# Patient Record
Sex: FEMALE | Race: WHITE | NOT HISPANIC OR LATINO | Employment: OTHER | URBAN - METROPOLITAN AREA
[De-identification: names, ages, dates, MRNs, and addresses within clinical notes are randomized per-mention and may not be internally consistent; named-entity substitution may affect disease eponyms.]

---

## 2017-01-03 ENCOUNTER — TELEPHONE (OUTPATIENT)
Dept: FAMILY MEDICINE | Facility: CLINIC | Age: 67
End: 2017-01-03

## 2017-01-03 DIAGNOSIS — E11.8 CONTROLLED TYPE 2 DIABETES MELLITUS WITH COMPLICATION, WITHOUT LONG-TERM CURRENT USE OF INSULIN: Primary | ICD-10-CM

## 2017-01-03 NOTE — TELEPHONE ENCOUNTER
----- Message from Emperatriz Porter sent at 1/3/2017  3:05 PM CST -----  Please call  @ [phone] - there's been a change of insurance, labwork location and med provider....please call to discuss this with him...

## 2017-01-03 NOTE — TELEPHONE ENCOUNTER
Orders for siOPTICA pended.  Lab appt at [facility] cancelled.  Please cancel/remove orders for [facility].

## 2017-01-10 LAB
ALBUMIN SERPL-MCNC: 4.6 G/DL (ref 3.6–5.1)
ALBUMIN/GLOB SERPL: 1.9 (CALC) (ref 1–2.5)
ALP SERPL-CCNC: 60 U/L (ref 33–130)
ALT SERPL-CCNC: 22 U/L (ref 6–29)
AST SERPL-CCNC: 21 U/L (ref 10–35)
BASOPHILS # BLD AUTO: 44 CELLS/UL (ref 0–200)
BASOPHILS NFR BLD AUTO: 0.5 %
BILIRUB SERPL-MCNC: 0.5 MG/DL (ref 0.2–1.2)
BUN SERPL-MCNC: 18 MG/DL (ref 7–25)
BUN/CREAT SERPL: ABNORMAL (CALC) (ref 6–22)
CALCIUM SERPL-MCNC: 10 MG/DL (ref 8.6–10.4)
CHLORIDE SERPL-SCNC: 102 MMOL/L (ref 98–110)
CHOLEST SERPL-MCNC: 153 MG/DL (ref 125–200)
CHOLEST/HDLC SERPL: 2.9 (CALC)
CO2 SERPL-SCNC: 33 MMOL/L (ref 20–31)
CREAT SERPL-MCNC: 0.85 MG/DL (ref 0.5–0.99)
EOSINOPHIL # BLD AUTO: 202 CELLS/UL (ref 15–500)
EOSINOPHIL NFR BLD AUTO: 2.3 %
ERYTHROCYTE [DISTWIDTH] IN BLOOD BY AUTOMATED COUNT: 14.8 % (ref 11–15)
GFR SERPL CREATININE-BSD FRML MDRD: 72 ML/MIN/1.73M2
GLOBULIN SER CALC-MCNC: 2.4 G/DL (CALC) (ref 1.9–3.7)
GLUCOSE SERPL-MCNC: 122 MG/DL (ref 65–99)
HBA1C MFR BLD: 6.3 % OF TOTAL HGB
HCT VFR BLD AUTO: 40.9 % (ref 35–45)
HDLC SERPL-MCNC: 53 MG/DL
HGB BLD-MCNC: 12.9 G/DL (ref 11.7–15.5)
LDLC SERPL CALC-MCNC: 75 MG/DL (CALC)
LYMPHOCYTES # BLD AUTO: 3247 CELLS/UL (ref 850–3900)
LYMPHOCYTES NFR BLD AUTO: 36.9 %
MCH RBC QN AUTO: 26 PG (ref 27–33)
MCHC RBC AUTO-ENTMCNC: 31.5 G/DL (ref 32–36)
MCV RBC AUTO: 82.4 FL (ref 80–100)
MONOCYTES # BLD AUTO: 607 CELLS/UL (ref 200–950)
MONOCYTES NFR BLD AUTO: 6.9 %
NEUTROPHILS # BLD AUTO: 4699 CELLS/UL (ref 1500–7800)
NEUTROPHILS NFR BLD AUTO: 53.4 %
NONHDLC SERPL-MCNC: 100 MG/DL (CALC)
PLATELET # BLD AUTO: 293 THOUSAND/UL (ref 140–400)
PMV BLD REES-ECKER: 8.9 FL (ref 7.5–11.5)
POTASSIUM SERPL-SCNC: 4.4 MMOL/L (ref 3.5–5.3)
PROT SERPL-MCNC: 7 G/DL (ref 6.1–8.1)
RBC # BLD AUTO: 4.96 MILLION/UL (ref 3.8–5.1)
SODIUM SERPL-SCNC: 139 MMOL/L (ref 135–146)
TRIGL SERPL-MCNC: 123 MG/DL
TSH SERPL-ACNC: 1.99 MIU/L (ref 0.4–4.5)
WBC # BLD AUTO: 8.8 THOUSAND/UL (ref 3.8–10.8)

## 2017-01-11 ENCOUNTER — PATIENT MESSAGE (OUTPATIENT)
Dept: FAMILY MEDICINE | Facility: CLINIC | Age: 67
End: 2017-01-11

## 2017-01-18 ENCOUNTER — OFFICE VISIT (OUTPATIENT)
Dept: OBSTETRICS AND GYNECOLOGY | Facility: CLINIC | Age: 67
End: 2017-01-18
Payer: COMMERCIAL

## 2017-01-18 VITALS
BODY MASS INDEX: 24.99 KG/M2 | SYSTOLIC BLOOD PRESSURE: 128 MMHG | WEIGHT: 135.81 LBS | HEIGHT: 62 IN | DIASTOLIC BLOOD PRESSURE: 78 MMHG

## 2017-01-18 DIAGNOSIS — Z79.890 POSTMENOPAUSAL HRT (HORMONE REPLACEMENT THERAPY): Primary | ICD-10-CM

## 2017-01-18 PROCEDURE — 99213 OFFICE O/P EST LOW 20 MIN: CPT | Mod: S$GLB,,, | Performed by: OBSTETRICS & GYNECOLOGY

## 2017-01-18 PROCEDURE — 1159F MED LIST DOCD IN RCRD: CPT | Mod: S$GLB,,, | Performed by: OBSTETRICS & GYNECOLOGY

## 2017-01-18 PROCEDURE — 3078F DIAST BP <80 MM HG: CPT | Mod: S$GLB,,, | Performed by: OBSTETRICS & GYNECOLOGY

## 2017-01-18 PROCEDURE — 99999 PR PBB SHADOW E&M-EST. PATIENT-LVL II: CPT | Mod: PBBFAC,,, | Performed by: OBSTETRICS & GYNECOLOGY

## 2017-01-18 PROCEDURE — 3074F SYST BP LT 130 MM HG: CPT | Mod: S$GLB,,, | Performed by: OBSTETRICS & GYNECOLOGY

## 2017-01-18 NOTE — MR AVS SNAPSHOT
[facility] - OB/GYN  [address] [place] [address]  Phone: [phone]                  Katherine Rivers   2017 2:00 PM   Office Visit    Description:  Female : 1950   Provider:  Jeremias Plascencia MD   Department:  [facility] - OB/GYN           Reason for Visit     Well Woman                To Do List           Future Appointments        Provider Department Dept Phone    2017 2:00 PM Jeremias Plascencia MD [facility] [phone]      Goals (5 Years of Data)     None      [facility]     [facility] Care [facility] -  Assistance  Registered nurses in the [facility] provide clinical advisement, health education, appointment booking, and other advisory services.  Call for this free service at [phone].             Medications           Message regarding Medications     Verify the changes and/or additions to your medication regime listed below are the same as discussed with your clinician today.  If any of these changes or additions are incorrect, please notify your healthcare provider.        STOP taking these medications     dexamethasone injection 4 mg     FA-vit Bcomp-C-zinc-vitamin D3 0.8-2,000 mg-unit Tab Take 1 tablet by mouth once daily.           Verify that the below list of medications is an accurate representation of the medications you are currently taking.  If none reported, the list may be blank. If incorrect, please contact your healthcare provider. Carry this list with you in case of emergency.           Current Medications     aspirin (ECOTRIN) 81 MG EC tablet Take 81 mg by mouth once daily.    aspirin-sod bicarb-citric acid (KITA-SELTZER) 324 mg TbEF Take 325 mg by mouth every evening. 1/2 tablet at night    atorvastatin (LIPITOR) 20 MG tablet TAKE 1 TABLET DAILY    blood sugar diagnostic (ONETOUCH ULTRA TEST) Strp Check once daily    blood-glucose meter kit 1 each by Other route once daily. Use as instructed    docusate  "sodium (COLACE) 100 MG capsule Take 100 mg by mouth 2 (two) times daily.      estradiol (VAGIFEM) 10 mcg Tab INSERT 1 TABLET VAGINALLY TWICE PER WEEK    L. ACIDOPHILUS/BIFID. ANIMALIS (ONE-A-DAY TRUBIOTICS ORAL) Take 1 tablet by mouth once daily.    MULTIVITAMIN WITH MINERALS (HAIR,SKIN & NAILS ORAL) Take 1 tablet by mouth 2 (two) times daily.      ONE TOUCH ULTRASOFT LANCETS lancets USE AS DIRECTED DAILY AND AS NEEDED    ranitidine (ZANTAC) 300 MG tablet Take 1 tablet (300 mg total) by mouth every evening.    sitagliptan-metformin (JANUMET) 50-1,000 mg per tablet Take 1 tablet by mouth 2 (two) times daily with meals.    tretinoin (RETIN-A) 0.025 % cream     GLUC.METER,DIS.P-LOADED STRIPS (GLUCOSE METER, DISP & STRIPS) Kit 1 each by Misc.(Non-Drug; Combo Route) route as directed.           Clinical Reference Information           Vital Signs - Last Recorded  Most recent update: 1/18/2017  1:52 PM by Galina Edwards LPN    BP Ht Wt LMP BMI    128/78 5' 2" (1.575 m) 61.6 kg (135 lb 12.9 oz) 05/23/2012 24.84 kg/m2      Blood Pressure          Most Recent Value    BP  128/78      Allergies as of 1/18/2017     No Known Drug Allergies      Immunizations Administered on Date of Encounter - 1/18/2017     None      "

## 2017-01-18 NOTE — PROGRESS NOTES
Chief Complaint   Patient presents with    Well Woman       History and Physical:  Patient's last menstrual period was 05/23/2012.       Katherine Rivers is a 66 y.o.  female who presents today for her routine Hormone Replacement Therapy follow up. S/p hysterectomy / BSO / vaginal repair. The patient has no Gynecology complaints today. Doing well with HRt      Allergies:   Review of patient's allergies indicates:   Allergen Reactions    No known drug allergies        Past Medical History   Diagnosis Date    Abnormal Pap smear      biopsy, removal of large polyp per pt    Bilateral hearing loss      wears hearing aides    Cystocele     Diabetes mellitus      dx 57    Diverticular disease     General anesthetics causing adverse effect in therapeutic use      faints after surgery    GERD (gastroesophageal reflux disease)     H/O esophagogastroduodenoscopy      normal 3/16    History of colonoscopy      3/16; next due 3/21    Hyperlipidemia LDL goal < 100     Irritable bowel syndrome     Osteopenia      Dexa 4/11 and 7/15       Past Surgical History   Procedure Laterality Date    Belt abdominoplasty      Cervical polypectomy      Bladder suspension  2/23/12      x 3    Vaginal delivery       times 2    Bilateral salpingoophorectomy      [facility]rectomy  2011     [facility] BSO    Oophorectomy  2011     bilat    Colporrhaphy      Vaginal hysterectomy w/ anterior and posterior vaginal repair  05/20/14    Colonoscopy  1/2006     by Dr. lauren barcenas (report in media section) diverticulosis, otherwise normal findings, repeat in 10 years for screening    Dental implant      Colonoscopy N/A 3/8/2016     Procedure: COLONOSCOPY;  Surgeon: Erickson Uribe Jr., MD;  Location: [facility];  Service: Endoscopy;  Laterality: N/A;    Cholecystectomy         MEDS:   Current Outpatient Prescriptions on File Prior to Visit   Medication Sig Dispense Refill    aspirin (ECOTRIN) 81 MG EC tablet Take 81 mg by mouth  once daily.      aspirin-sod bicarb-citric acid (KITA-SELTZER) 324 mg TbEF Take 325 mg by mouth every evening. 1/2 tablet at night      atorvastatin (LIPITOR) 20 MG tablet TAKE 1 TABLET DAILY 90 tablet 1    blood sugar diagnostic (ONETOUCH ULTRA TEST) Strp Check once daily 150 strip 3    blood-glucose meter kit 1 each by Other route once daily. Use as instructed      docusate sodium (COLACE) 100 MG capsule Take 100 mg by mouth 2 (two) times daily.        estradiol (VAGIFEM) 10 mcg Tab INSERT 1 TABLET VAGINALLY TWICE PER WEEK 26 tablet 3    L. ACIDOPHILUS/BIFID. ANIMALIS (ONE-A-DAY TRUBIOTICS ORAL) Take 1 tablet by mouth once daily.      MULTIVITAMIN WITH MINERALS (HAIR,SKIN & NAILS ORAL) Take 1 tablet by mouth 2 (two) times daily.        ONE TOUCH ULTRASOFT LANCETS lancets USE AS DIRECTED DAILY AND AS NEEDED 1 each 49    ranitidine (ZANTAC) 300 MG tablet Take 1 tablet (300 mg total) by mouth every evening. 90 tablet 3    sitagliptan-metformin (JANUMET) 50-1,000 mg per tablet Take 1 tablet by mouth 2 (two) times daily with meals. 180 tablet 1    tretinoin (RETIN-A) 0.025 % cream       GLUC.METER,DIS.P-LOADED STRIPS (GLUCOSE METER, DISP & STRIPS) Kit 1 each by Misc.(Non-Drug; Combo Route) route as directed. 1 kit     [DISCONTINUED] FA-vit Bcomp-C-zinc-vitamin D3 0.8-2,000 mg-unit Tab Take 1 tablet by mouth once daily.       Current Facility-Administered Medications on File Prior to Visit   Medication Dose Route Frequency Provider Last Rate Last Dose    [DISCONTINUED] dexamethasone injection 4 mg  4 mg Other Once Jeremias Alvarado DPM           OB History      Para Term  AB TAB SAB Ectopic Multiple Living    3 2 2  1  1   2          Social History     Social History    Marital status:      Spouse name: N/A    Number of children: N/A    Years of education: N/A     Occupational History    Not on file.     Social History Main Topics    Smoking status: Never Smoker    Smokeless  "tobacco: Never Used    Alcohol use 0.5 oz/week     1 Standard drinks or equivalent per week      Comment: rarely     Drug use: No    Sexual activity: Yes     Partners: Male     Birth control/ protection: Post-menopausal     Other Topics Concern    Not on file     Social History Narrative       Family History   Problem Relation Age of Onset    Hyperlipidemia Mother     Diabetes Father      Type 1    Cancer Father      lung + tob    Cancer Maternal Aunt      breast cancer    Breast cancer Maternal Aunt     Ovarian cancer Neg Hx          Past medical and surgical history reviewed.   I have reviewed the patient's medical history in detail and updated the computerized patient record.        Review of System:   General: no chills, fever, night sweats, weight gain or weight loss  Psychological: no depression or suicidal ideation  Breasts: no new or changing breast lumps, nipple discharge or masses.  Respiratory: no cough, shortness of breath, or wheezing  Cardiovascular: no chest pain or dyspnea on exertion  Gastrointestinal: no abdominal pain, change in bowel habits, or black or bloody stools  Genito-Urinary: no incontinence, urinary frequency/urgency or vulvar/vaginal symptoms, pelvic pain or abnormal vaginal bleeding.  Musculoskeletal: no gait disturbance or muscular weakness      Physical Exam:     Visit Vitals    /78    Ht 5' 2" (1.575 m)    Wt 61.6 kg (135 lb 12.9 oz)    LMP 05/23/2012    BMI 24.84 kg/m2     Constitutional: She is oriented to person, place, and time. She appears well-developed and well-nourished. No distress.   HENT:   Head: Normocephalic and atraumatic.   Eyes: Conjunctivae and EOM are normal. No scleral icterus.   Neck: Normal range of motion. Neck supple. No tracheal deviation present.   Cardiovascular: Normal rate.    Pulmonary/Chest: Effort normal. No respiratory distress. She exhibits no tenderness.  Breasts: are symmetrical.   Right breast exhibits no inverted nipple, no " mass, no nipple discharge, no skin change and no tenderness.   Left breast exhibits no inverted nipple, no mass, no nipple discharge, no skin change and no tenderness.  Abdominal: Soft. She exhibits no distension and no mass. There is no tenderness. There is no rebound and no guarding.   Genitourinary: deferred  Musculoskeletal: Normal range of motion.   Neurological: She is alert and oriented to person, place, and time. Coordination normal.   Skin: Skin is warm and dry. She is not diaphoretic.   Psychiatric: She has a normal mood and affect.        Assessment:   Normal GYN exam  HRT doing well  Plan:   PAP not needed  Mammogram  Refill HRT   Follow up in 1 year.

## 2017-01-19 RX ORDER — ESTRADIOL 10 UG/1
INSERT VAGINAL
Qty: 26 TABLET | Refills: 3 | Status: SHIPPED | OUTPATIENT
Start: 2017-01-19 | End: 2017-02-01 | Stop reason: SDUPTHER

## 2017-02-01 ENCOUNTER — PATIENT MESSAGE (OUTPATIENT)
Dept: FAMILY MEDICINE | Facility: CLINIC | Age: 67
End: 2017-02-01

## 2017-02-01 DIAGNOSIS — R12 HEARTBURN: ICD-10-CM

## 2017-02-01 RX ORDER — ESTRADIOL 10 UG/1
INSERT VAGINAL
Qty: 26 TABLET | Refills: 3 | Status: SHIPPED | OUTPATIENT
Start: 2017-02-01 | End: 2017-12-05

## 2017-02-01 RX ORDER — ATORVASTATIN CALCIUM 20 MG/1
20 TABLET, FILM COATED ORAL DAILY
Qty: 90 TABLET | Refills: 1 | Status: SHIPPED | OUTPATIENT
Start: 2017-02-01 | End: 2017-07-25 | Stop reason: SDUPTHER

## 2017-05-26 ENCOUNTER — PATIENT OUTREACH (OUTPATIENT)
Dept: ADMINISTRATIVE | Facility: HOSPITAL | Age: 67
End: 2017-05-26

## 2017-05-26 ENCOUNTER — TELEPHONE (OUTPATIENT)
Dept: FAMILY MEDICINE | Facility: CLINIC | Age: 67
End: 2017-05-26

## 2017-05-26 DIAGNOSIS — E11.9 TYPE 2 DIABETES MELLITUS WITHOUT COMPLICATION: ICD-10-CM

## 2017-05-26 DIAGNOSIS — E11.8 TYPE 2 DIABETES MELLITUS WITH COMPLICATION, WITHOUT LONG-TERM CURRENT USE OF INSULIN: Primary | ICD-10-CM

## 2017-05-26 NOTE — TELEPHONE ENCOUNTER
Patient is requesting orders for blood work prior to 6/5/17 appt, to be done at [facility]. Please advise.

## 2017-05-26 NOTE — PROGRESS NOTES
A urine micro bulk order was pended for this patient.    Last OV with Dr. Casillas 9/2016.  Due for an office visit with her, a urine test for your kidneys, your annual diabetic eye exam and possibly a pneumonia immunization     Letter/Mychart message sent to notify patient.

## 2017-06-01 LAB
ALBUMIN SERPL-MCNC: 4.3 G/DL (ref 3.6–5.1)
ALBUMIN/GLOB SERPL: 2 (CALC) (ref 1–2.5)
ALP SERPL-CCNC: 53 U/L (ref 33–130)
ALT SERPL-CCNC: 15 U/L (ref 6–29)
AST SERPL-CCNC: 19 U/L (ref 10–35)
BILIRUB SERPL-MCNC: 0.3 MG/DL (ref 0.2–1.2)
BUN SERPL-MCNC: 17 MG/DL (ref 7–25)
BUN/CREAT SERPL: ABNORMAL (CALC) (ref 6–22)
CALCIUM SERPL-MCNC: 9.6 MG/DL (ref 8.6–10.4)
CHLORIDE SERPL-SCNC: 103 MMOL/L (ref 98–110)
CHOLEST SERPL-MCNC: 143 MG/DL (ref 125–200)
CHOLEST/HDLC SERPL: 2.7 (CALC)
CO2 SERPL-SCNC: 30 MMOL/L (ref 20–31)
CREAT SERPL-MCNC: 0.84 MG/DL (ref 0.5–0.99)
GFR SERPL CREATININE-BSD FRML MDRD: 73 ML/MIN/1.73M2
GLOBULIN SER CALC-MCNC: 2.2 G/DL (CALC) (ref 1.9–3.7)
GLUCOSE SERPL-MCNC: 115 MG/DL (ref 65–99)
HBA1C MFR BLD: 6.1 % OF TOTAL HGB
HDLC SERPL-MCNC: 53 MG/DL
LDLC SERPL CALC-MCNC: 69 MG/DL (CALC)
NONHDLC SERPL-MCNC: 90 MG/DL (CALC)
POTASSIUM SERPL-SCNC: 4.4 MMOL/L (ref 3.5–5.3)
PROT SERPL-MCNC: 6.5 G/DL (ref 6.1–8.1)
SODIUM SERPL-SCNC: 141 MMOL/L (ref 135–146)
TRIGL SERPL-MCNC: 107 MG/DL

## 2017-06-03 ENCOUNTER — PATIENT MESSAGE (OUTPATIENT)
Dept: FAMILY MEDICINE | Facility: CLINIC | Age: 67
End: 2017-06-03

## 2017-06-05 ENCOUNTER — OFFICE VISIT (OUTPATIENT)
Dept: FAMILY MEDICINE | Facility: CLINIC | Age: 67
End: 2017-06-05
Payer: MEDICARE

## 2017-06-05 VITALS
HEIGHT: 66 IN | RESPIRATION RATE: 16 BRPM | WEIGHT: 138 LBS | BODY MASS INDEX: 22.18 KG/M2 | DIASTOLIC BLOOD PRESSURE: 82 MMHG | TEMPERATURE: 98 F | HEART RATE: 65 BPM | SYSTOLIC BLOOD PRESSURE: 128 MMHG

## 2017-06-05 DIAGNOSIS — E11.8 TYPE 2 DIABETES MELLITUS WITH COMPLICATION, WITHOUT LONG-TERM CURRENT USE OF INSULIN: Primary | ICD-10-CM

## 2017-06-05 DIAGNOSIS — E11.69 HYPERLIPIDEMIA ASSOCIATED WITH TYPE 2 DIABETES MELLITUS: ICD-10-CM

## 2017-06-05 DIAGNOSIS — R11.0 NAUSEA: ICD-10-CM

## 2017-06-05 DIAGNOSIS — E78.5 HYPERLIPIDEMIA ASSOCIATED WITH TYPE 2 DIABETES MELLITUS: ICD-10-CM

## 2017-06-05 LAB
CREAT UR-MCNC: 34 MG/DL
MICROALBUMIN UR DL<=1MG/L-MCNC: 3 UG/ML
MICROALBUMIN/CREATININE RATIO: 8.8 UG/MG

## 2017-06-05 PROCEDURE — 99214 OFFICE O/P EST MOD 30 MIN: CPT | Mod: S$GLB,,, | Performed by: FAMILY MEDICINE

## 2017-06-05 PROCEDURE — 3044F HG A1C LEVEL LT 7.0%: CPT | Mod: S$GLB,,, | Performed by: FAMILY MEDICINE

## 2017-06-05 PROCEDURE — 82570 ASSAY OF URINE CREATININE: CPT

## 2017-06-05 RX ORDER — CIMETIDINE 800 MG
800 TABLET ORAL 2 TIMES DAILY
Qty: 60 TABLET | Refills: 1 | Status: SHIPPED | OUTPATIENT
Start: 2017-06-05 | End: 2017-06-12

## 2017-06-05 RX ORDER — DOCUSATE SODIUM 100 MG/1
100 CAPSULE, LIQUID FILLED ORAL DAILY
COMMUNITY
End: 2018-10-09

## 2017-06-07 ENCOUNTER — PATIENT MESSAGE (OUTPATIENT)
Dept: FAMILY MEDICINE | Facility: CLINIC | Age: 67
End: 2017-06-07

## 2017-06-07 NOTE — PROGRESS NOTES
Subjective:       Patient ID: Katherine Rivers is a 67 y.o. female.    Chief Complaint: Follow-up    HPI   The patient is a 67-year-old who is here today for chronic follow-up.  Overall, she is doing well.  She recently had labs which we reviewed.  Today we also discussed the followin)  diabetes.  Her recent A1c was 6.1.  She's happy with her diabetic control.  Her readings at home are normal.  She denies any hypoglycemic episodes.  She is currently taking Janumet  mg twice a day  2)  hyperlipidemia.  She is doing well with her Lipitor.  We did review her cholesterol results which looked good  3)  nausea.  Ever since her gallbladder issues, she is frequently bothered by nausea in the morning.  She has seen the gastroenterologist and has had testing which has been unremarkable.  She believes her nausea is due to acid reflux.  She will usually have relief of her nausea in the morning when she takes a Tums.  She also attributes her nausea to going so long without eating overnight as she is usually eats last between 4 and 5 PM and wakes up at 6:30 AM.  She does take Zantac at night but wonders if there something else that she can take for her stomach.  She denies any abdominal pain.  She denies any change in her appetite or her weight.  She denies any vomiting associated with her nausea      Review of Systems   Constitutional: Negative for appetite change, chills, diaphoresis, fatigue, fever and unexpected weight change.   HENT: Negative for congestion, ear pain, postnasal drip, rhinorrhea, sinus pressure, sneezing, sore throat and trouble swallowing.    Eyes: Negative for pain, discharge and visual disturbance.   Respiratory: Negative for cough, chest tightness, shortness of breath and wheezing.    Cardiovascular: Negative for chest pain, palpitations and leg swelling.   Gastrointestinal: Positive for nausea. Negative for abdominal distention, abdominal pain, blood in stool, constipation, diarrhea and  vomiting.   Skin: Negative for rash.       Objective:      Physical Exam   Constitutional: She is oriented to person, place, and time. She appears well-developed and well-nourished. No distress.   HENT:   Head: Normocephalic and atraumatic.   Right Ear: Hearing, tympanic membrane, external ear and ear canal normal.   Left Ear: Hearing, tympanic membrane, external ear and ear canal normal.   Nose: Nose normal.   Mouth/Throat: Oropharynx is clear and moist and mucous membranes are normal. No oral lesions. No oropharyngeal exudate, posterior oropharyngeal edema or posterior oropharyngeal erythema.   Eyes: Conjunctivae, EOM and lids are normal. Pupils are equal, round, and reactive to light. No scleral icterus.   Neck: Normal range of motion. Neck supple. Carotid bruit is not present. No thyroid mass and no thyromegaly present.   Cardiovascular: Normal rate, regular rhythm and normal heart sounds.   No extrasystoles are present. PMI is not displaced.  Exam reveals no gallop.    No murmur heard.  Pulmonary/Chest: Effort normal and breath sounds normal. No accessory muscle usage. No respiratory distress.   Clear to auscultation bilaterally.   Abdominal: Soft. Normal appearance and bowel sounds are normal. She exhibits no abdominal bruit. There is no hepatosplenomegaly. There is no tenderness. There is no rebound.   Lymphadenopathy:        Head (right side): No submental and no submandibular adenopathy present.        Head (left side): No submental and no submandibular adenopathy present.        Right cervical: No superficial cervical, no deep cervical and no posterior cervical adenopathy present.       Left cervical: No superficial cervical, no deep cervical and no posterior cervical adenopathy present.        Right: No supraclavicular adenopathy present.        Left: No supraclavicular adenopathy present.   Neurological: She is alert and oriented to person, place, and time.   Skin: Skin is warm, dry and intact.  "  Psychiatric: She has a normal mood and affect.     Blood pressure 128/82, pulse 65, temperature 98.2 °F (36.8 °C), temperature source Oral, resp. rate 16, height 5' 6" (1.676 m), weight 62.6 kg (138 lb 0.1 oz), last menstrual period 05/23/2012.Body mass index is 22.28 kg/m².        A/P:  1)  diabetes.  Well controlled.  Continue with Janumet.  We will check an urine microalbumin today.  As long as she does well, I will see her back in 6 months with labs prior to that visit   2)  hyperlipidemia.  Well controlled.  Continue with Lipitor.  We will recheck labs in 6 months  3)  nausea.  Persistent.  We will try Tagamet 800 mg twice a day.  She will also try a nighttime snack.  She will also check her sugars when she feels this way to see if she's having any hypoglycemia.  If her symptoms persist or worsen, she will let me knowhe will also try a nighttime snack.  She will also check her sugars when she feels this way to see if she's having any hypoglycemia.  If her symptoms persist or worsen, she will let me know  "

## 2017-06-12 ENCOUNTER — TELEPHONE (OUTPATIENT)
Dept: FAMILY MEDICINE | Facility: CLINIC | Age: 67
End: 2017-06-12

## 2017-06-12 ENCOUNTER — PATIENT MESSAGE (OUTPATIENT)
Dept: FAMILY MEDICINE | Facility: CLINIC | Age: 67
End: 2017-06-12

## 2017-06-12 RX ORDER — LANCETS
EACH MISCELLANEOUS
Qty: 100 EACH | Refills: 5 | Status: SHIPPED | OUTPATIENT
Start: 2017-06-12 | End: 2021-01-14 | Stop reason: SDUPTHER

## 2017-06-12 NOTE — TELEPHONE ENCOUNTER
----- Message from Purnima Cai sent at 6/12/2017  3:19 PM CDT -----  Contact: Pt  Pt is requesting to speak with the nurse in regards to side effects from her new meds./ Pt states she is experiencing diarrhea, dizziness, sugar spikes and stomach issues./ Pt can be reached at [phone] (home)

## 2017-07-07 ENCOUNTER — PATIENT MESSAGE (OUTPATIENT)
Dept: FAMILY MEDICINE | Facility: CLINIC | Age: 67
End: 2017-07-07

## 2017-07-11 ENCOUNTER — PATIENT MESSAGE (OUTPATIENT)
Dept: FAMILY MEDICINE | Facility: CLINIC | Age: 67
End: 2017-07-11

## 2017-07-11 DIAGNOSIS — Z12.31 ENCOUNTER FOR SCREENING MAMMOGRAM FOR MALIGNANT NEOPLASM OF BREAST: ICD-10-CM

## 2017-07-11 DIAGNOSIS — Z12.39 ENCOUNTER FOR SCREENING FOR MALIGNANT NEOPLASM OF BREAST: Primary | ICD-10-CM

## 2017-07-25 RX ORDER — ATORVASTATIN CALCIUM 20 MG/1
TABLET, FILM COATED ORAL
Qty: 90 TABLET | Refills: 1 | Status: SHIPPED | OUTPATIENT
Start: 2017-07-25 | End: 2017-12-05 | Stop reason: SDUPTHER

## 2017-08-04 ENCOUNTER — TELEPHONE (OUTPATIENT)
Dept: FAMILY MEDICINE | Facility: CLINIC | Age: 67
End: 2017-08-04

## 2017-08-04 NOTE — TELEPHONE ENCOUNTER
----- Message from Purnima Cai sent at 8/4/2017 10:25 AM CDT -----  Emperatriz, [facility],[phone] states the pt is there now and they need an order for her mammogram faxed to [phone]

## 2017-08-04 NOTE — TELEPHONE ENCOUNTER
Returned call to Emperatriz at [facility]. States she received both faxes for pt's mammogram. Sent via routing. DEBBY Rosales LPN

## 2017-09-17 RX ORDER — SITAGLIPTIN AND METFORMIN HYDROCHLORIDE 1000; 50 MG/1; MG/1
TABLET, FILM COATED ORAL
Qty: 180 TABLET | Refills: 1 | Status: SHIPPED | OUTPATIENT
Start: 2017-09-17 | End: 2017-12-05 | Stop reason: SDUPTHER

## 2017-11-29 ENCOUNTER — PATIENT MESSAGE (OUTPATIENT)
Dept: FAMILY MEDICINE | Facility: CLINIC | Age: 67
End: 2017-11-29

## 2017-11-29 DIAGNOSIS — E11.8 TYPE 2 DIABETES MELLITUS WITH COMPLICATION, WITHOUT LONG-TERM CURRENT USE OF INSULIN: ICD-10-CM

## 2017-11-29 DIAGNOSIS — E78.5 HYPERLIPIDEMIA, UNSPECIFIED HYPERLIPIDEMIA TYPE: Primary | ICD-10-CM

## 2017-12-05 ENCOUNTER — OFFICE VISIT (OUTPATIENT)
Dept: FAMILY MEDICINE | Facility: CLINIC | Age: 67
End: 2017-12-05
Payer: MEDICARE

## 2017-12-05 ENCOUNTER — PATIENT MESSAGE (OUTPATIENT)
Dept: FAMILY MEDICINE | Facility: CLINIC | Age: 67
End: 2017-12-05

## 2017-12-05 VITALS
DIASTOLIC BLOOD PRESSURE: 82 MMHG | SYSTOLIC BLOOD PRESSURE: 128 MMHG | BODY MASS INDEX: 22.15 KG/M2 | TEMPERATURE: 98 F | WEIGHT: 137.81 LBS | HEIGHT: 66 IN | RESPIRATION RATE: 16 BRPM | HEART RATE: 80 BPM

## 2017-12-05 DIAGNOSIS — E11.69 HYPERLIPIDEMIA ASSOCIATED WITH TYPE 2 DIABETES MELLITUS: ICD-10-CM

## 2017-12-05 DIAGNOSIS — E11.8 TYPE 2 DIABETES MELLITUS WITH COMPLICATION, WITHOUT LONG-TERM CURRENT USE OF INSULIN: Primary | ICD-10-CM

## 2017-12-05 DIAGNOSIS — E78.5 HYPERLIPIDEMIA ASSOCIATED WITH TYPE 2 DIABETES MELLITUS: ICD-10-CM

## 2017-12-05 DIAGNOSIS — E55.9 VITAMIN D DEFICIENCY: ICD-10-CM

## 2017-12-05 LAB
ALBUMIN SERPL-MCNC: 4.4 G/DL (ref 3.6–5.1)
ALBUMIN/GLOB SERPL: 1.8 (CALC) (ref 1–2.5)
ALP SERPL-CCNC: 59 U/L (ref 33–130)
ALT SERPL-CCNC: 20 U/L (ref 6–29)
AST SERPL-CCNC: 20 U/L (ref 10–35)
BILIRUB SERPL-MCNC: 0.6 MG/DL (ref 0.2–1.2)
BUN SERPL-MCNC: 15 MG/DL (ref 7–25)
BUN/CREAT SERPL: ABNORMAL (CALC) (ref 6–22)
CALCIUM SERPL-MCNC: 10.5 MG/DL (ref 8.6–10.4)
CHLORIDE SERPL-SCNC: 102 MMOL/L (ref 98–110)
CHOLEST SERPL-MCNC: 171 MG/DL
CHOLEST/HDLC SERPL: 3.1 (CALC)
CO2 SERPL-SCNC: 31 MMOL/L (ref 20–31)
CREAT SERPL-MCNC: 0.69 MG/DL (ref 0.5–0.99)
GFR SERPL CREATININE-BSD FRML MDRD: 91 ML/MIN/1.73M2
GLOBULIN SER CALC-MCNC: 2.5 G/DL (CALC) (ref 1.9–3.7)
GLUCOSE SERPL-MCNC: 115 MG/DL (ref 65–99)
HBA1C MFR BLD: 6.2 % OF TOTAL HGB
HDLC SERPL-MCNC: 56 MG/DL
LDLC SERPL CALC-MCNC: 90 MG/DL (CALC)
NONHDLC SERPL-MCNC: 115 MG/DL (CALC)
POTASSIUM SERPL-SCNC: 4.7 MMOL/L (ref 3.5–5.3)
PROT SERPL-MCNC: 6.9 G/DL (ref 6.1–8.1)
SODIUM SERPL-SCNC: 138 MMOL/L (ref 135–146)
TRIGL SERPL-MCNC: 145 MG/DL

## 2017-12-05 PROCEDURE — 99214 OFFICE O/P EST MOD 30 MIN: CPT | Mod: S$GLB,,, | Performed by: FAMILY MEDICINE

## 2017-12-05 RX ORDER — ATORVASTATIN CALCIUM 20 MG/1
20 TABLET, FILM COATED ORAL DAILY
Qty: 90 TABLET | Refills: 1 | Status: SHIPPED | OUTPATIENT
Start: 2017-12-05 | End: 2018-07-04 | Stop reason: SDUPTHER

## 2017-12-05 RX ORDER — HYDROGEN PEROXIDE 3 %
20 SOLUTION, NON-ORAL MISCELLANEOUS
Qty: 90 CAPSULE | Refills: 3 | Status: SHIPPED | OUTPATIENT
Start: 2017-12-05 | End: 2017-12-08

## 2017-12-05 RX ORDER — ESTRADIOL 10 UG/1
10 INSERT VAGINAL
COMMUNITY
End: 2018-01-23 | Stop reason: SDUPTHER

## 2017-12-05 RX ORDER — HYDROGEN PEROXIDE 3 %
20 SOLUTION, NON-ORAL MISCELLANEOUS
COMMUNITY
End: 2017-12-05 | Stop reason: SDUPTHER

## 2017-12-08 ENCOUNTER — PATIENT MESSAGE (OUTPATIENT)
Dept: FAMILY MEDICINE | Facility: CLINIC | Age: 67
End: 2017-12-08

## 2017-12-08 RX ORDER — ESOMEPRAZOLE MAGNESIUM 40 MG/1
40 CAPSULE, DELAYED RELEASE ORAL
Qty: 30 CAPSULE | Refills: 11 | Status: SHIPPED | OUTPATIENT
Start: 2017-12-08 | End: 2018-08-28

## 2017-12-11 NOTE — TELEPHONE ENCOUNTER
----- Message from Jesenia Stanford sent at 12/11/2017 12:13 PM CST -----  Contact: [phone]   Mr. Rivers - [facility] on file  Pt's  is inquiring about a recent lab order that was forwarded to [facility]. [facility] pt received paperwork(copy in Dr. Casillas's box) from [facility] and on it list [employer] as her primary insurance coverage which is actually her secondary coverage. Confirmed that the information in pt's file is correct with Medicare as primary and [employer] secondary.  Mr iRvers is just wondering how the [employer] was forwarded to [facility] as secondary.  States he did correct it with [facility] but just trying to prevent it from happening in the future.   Pls call Mr. Rivers is inform.

## 2017-12-12 NOTE — PROGRESS NOTES
Subjective:       Patient ID: Katherine Rivers is a 67 y.o. female.    Chief Complaint: Follow-up    HPI   The patient is a 67-year-old who is here today for follow-up.  Overall, she is doing well.  She recently had labs which we discussed.  Today we discussed the followin)  diabetes.  Her recent A1c was 6.2.  Her sugars at home have been good.  She's had no hypoglycemic episodes.  She continues with her Janumet which she is tolerating very well.  She does watch her diet and monitors her carbohydrate intake.  She also exercises regularly  2)  hyperlipidemia.  She is doing well with her Lipitor.  Her recent LDL was 90  3)  Nexium.  She has recently been taking Nexium every day.  With the Nexium, she has no further nausea.  She is aware of the long-term risks of PPI medications but feels that the quality of life she has from the Nexium outweighs any potential risks in the future.  With the Nexium, she has no nausea, vomiting, heartburn, trouble swallowing, or abdominal pain.      Review of Systems   Constitutional: Negative for appetite change, chills, diaphoresis, fatigue, fever and unexpected weight change.   HENT: Negative for congestion, ear pain, postnasal drip, rhinorrhea, sinus pressure, sneezing, sore throat and trouble swallowing.    Eyes: Negative for pain, discharge and visual disturbance.   Respiratory: Negative for cough, chest tightness, shortness of breath and wheezing.    Cardiovascular: Negative for chest pain, palpitations and leg swelling.   Gastrointestinal: Negative for abdominal distention, abdominal pain, blood in stool, constipation, diarrhea, nausea and vomiting.   Endocrine: Negative for polydipsia, polyphagia and polyuria.   Skin: Negative for pallor and rash.   Neurological: Negative for dizziness, tremors, seizures, speech difficulty, weakness and headaches.   Psychiatric/Behavioral: Negative for confusion. The patient is not nervous/anxious.        Objective:      Physical Exam    Constitutional: She is oriented to person, place, and time. She appears well-developed and well-nourished. No distress.   HENT:   Head: Normocephalic and atraumatic.   Right Ear: Hearing, tympanic membrane, external ear and ear canal normal.   Left Ear: Hearing, tympanic membrane, external ear and ear canal normal.   Nose: Nose normal.   Mouth/Throat: Oropharynx is clear and moist and mucous membranes are normal. No oral lesions. No oropharyngeal exudate, posterior oropharyngeal edema or posterior oropharyngeal erythema.   Eyes: Conjunctivae, EOM and lids are normal. Pupils are equal, round, and reactive to light. No scleral icterus.   Neck: Normal range of motion. Neck supple. Carotid bruit is not present. No thyroid mass and no thyromegaly present.   Cardiovascular: Normal rate, regular rhythm and normal heart sounds.   No extrasystoles are present. PMI is not displaced.  Exam reveals no gallop.    No murmur heard.  Pulses:       Dorsalis pedis pulses are 2+ on the right side, and 2+ on the left side.        Posterior tibial pulses are 2+ on the right side, and 2+ on the left side.   Pulmonary/Chest: Effort normal and breath sounds normal. No accessory muscle usage. No respiratory distress.   Clear to auscultation bilaterally.   Abdominal: Soft. Normal appearance and bowel sounds are normal. She exhibits no abdominal bruit. There is no hepatosplenomegaly. There is no tenderness. There is no rebound.   Musculoskeletal:        Right foot: There is no deformity.        Left foot: There is no deformity.   Feet:   Right Foot:   Protective Sensation: 10 sites tested. 10 sites sensed.   Skin Integrity: Positive for warmth. Negative for ulcer or callus.   Left Foot:   Protective Sensation: 10 sites tested. 10 sites sensed.   Skin Integrity: Positive for warmth. Negative for ulcer or callus.   Lymphadenopathy:        Head (right side): No submental and no submandibular adenopathy present.        Head (left side): No  "submental and no submandibular adenopathy present.        Right cervical: No superficial cervical, no deep cervical and no posterior cervical adenopathy present.       Left cervical: No superficial cervical, no deep cervical and no posterior cervical adenopathy present.        Right: No supraclavicular adenopathy present.        Left: No supraclavicular adenopathy present.   Neurological: She is alert and oriented to person, place, and time.   Skin: Skin is warm, dry and intact.   Psychiatric: She has a normal mood and affect.     Blood pressure 128/82, pulse 80, temperature 98 °F (36.7 °C), temperature source Oral, resp. rate 16, height 5' 6" (1.676 m), weight 62.5 kg (137 lb 12.8 oz), last menstrual period 05/23/2012.Body mass index is 22.24 kg/m².          A/P:  1)  diabetes.  Well controlled.  Continue current medication.  We will recheck an A1c in 6 months  2)  hyperlipidemia.  Well controlled.  Continue with Lipitor.  We will check fasting labs again in 6 months  3)  GERD.  Well-controlled.  I did refill her Nexium.  She is aware of long-term risks associated with chronic PPI use but again feels that those risks are worth the great benefit she has from this medication  4)  history of vitamin D deficiency.  Status unknown.  We will check a vitamin D level with her next set of labs  "

## 2018-01-23 ENCOUNTER — HOSPITAL ENCOUNTER (OUTPATIENT)
Dept: RADIOLOGY | Facility: HOSPITAL | Age: 68
Discharge: HOME | End: 2018-01-23
Attending: OBSTETRICS & GYNECOLOGY
Payer: MEDICARE

## 2018-01-23 ENCOUNTER — OFFICE VISIT (OUTPATIENT)
Dept: OBSTETRICS AND GYNECOLOGY | Facility: CLINIC | Age: 68
End: 2018-01-23
Payer: MEDICARE

## 2018-01-23 VITALS
HEIGHT: 66 IN | DIASTOLIC BLOOD PRESSURE: 78 MMHG | BODY MASS INDEX: 22.39 KG/M2 | WEIGHT: 139.31 LBS | SYSTOLIC BLOOD PRESSURE: 150 MMHG

## 2018-01-23 DIAGNOSIS — Z01.419 VISIT FOR GYNECOLOGIC EXAMINATION: ICD-10-CM

## 2018-01-23 DIAGNOSIS — Z12.31 VISIT FOR SCREENING MAMMOGRAM: Primary | ICD-10-CM

## 2018-01-23 DIAGNOSIS — Z12.31 VISIT FOR SCREENING MAMMOGRAM: ICD-10-CM

## 2018-01-23 PROCEDURE — 99999 PR PBB SHADOW E&M-EST. PATIENT-LVL III: CPT | Mod: PBBFAC,,, | Performed by: OBSTETRICS & GYNECOLOGY

## 2018-01-23 PROCEDURE — G0101 CA SCREEN;PELVIC/BREAST EXAM: HCPCS | Mod: S$GLB,,, | Performed by: OBSTETRICS & GYNECOLOGY

## 2018-01-23 RX ORDER — ESTRADIOL 10 UG/1
10 INSERT VAGINAL
Qty: 24 TABLET | Refills: 3 | Status: SHIPPED | OUTPATIENT
Start: 2018-01-25 | End: 2018-12-11 | Stop reason: SDUPTHER

## 2018-01-23 NOTE — PROGRESS NOTES
Chief Complaint   Patient presents with    hormones       History and Physical:  Patient's last menstrual period was 05/23/2012.       Katherine Rivers is a 67 y.o.  female who presents today for her routine annual GYN exam. The patient has no Gynecology complaints today. No Vaginal Bleeding , doingwell on vaginal estrogen tablets      Allergies:   Review of patient's allergies indicates:   Allergen Reactions    No known drug allergies        Past Medical History:   Diagnosis Date    Abnormal Pap smear     biopsy, removal of large polyp per pt    Bilateral hearing loss     wears hearing aides    Cystocele     Diabetes mellitus     dx 57    Diverticular disease     General anesthetics causing adverse effect in therapeutic use     faints after surgery    GERD (gastroesophageal reflux disease)     H/O esophagogastroduodenoscopy     normal 3/16    History of colonoscopy     3/16; next due 3/21    Hyperlipidemia LDL goal < 100     Irritable bowel syndrome     Osteopenia     Dexa 4/11 and 7/15       Past Surgical History:   Procedure Laterality Date    BELT ABDOMINOPLASTY      BILATERAL SALPINGOOPHORECTOMY      BLADDER SUSPENSION  2/23/12     x 3    CERVICAL POLYPECTOMY      CHOLECYSTECTOMY      COLONOSCOPY  1/2006    by Dr. lauren barcenas (report in media section) diverticulosis, otherwise normal findings, repeat in 10 years for screening    COLONOSCOPY N/A 3/8/2016    Procedure: COLONOSCOPY;  Surgeon: Erickson Uribe Jr., MD;  Location: [facility];  Service: Endoscopy;  Laterality: N/A;    COLPORRHAPHY      dental implant      [facility]Y  2011    [facility]    OOPHORECTOMY  2011    bilat    VAGINAL DELIVERY      times 2    VAGINAL HYSTERECTOMY W/ ANTERIOR AND POSTERIOR VAGINAL REPAIR  05/20/14       MEDS:   Current Outpatient Prescriptions on File Prior to Visit   Medication Sig Dispense Refill    aspirin (ECOTRIN) 81 MG EC tablet Take 81 mg by mouth once daily.      atorvastatin  (LIPITOR) 20 MG tablet Take 1 tablet (20 mg total) by mouth once daily. 90 tablet 1    blood sugar diagnostic (ONETOUCH ULTRA TEST) Strp Check once daily 150 strip 3    blood-glucose meter kit 1 each by Other route once daily. Use as instructed      esomeprazole (NEXIUM) 40 MG capsule Take 1 capsule (40 mg total) by mouth before breakfast. 30 capsule 11    L. ACIDOPHILUS/BIFID. ANIMALIS (ONE-A-DAY TRUBIOTICS ORAL) Take 1 tablet by mouth once daily.      lancets (ONETOUCH ULTRASOFT LANCETS) Misc Pt to check blood sugar 2x daily. 100 each 5    MULTIVITAMIN WITH MINERALS (HAIR,SKIN & NAILS ORAL) Take 1 tablet by mouth 2 (two) times daily.        SITagliptan-metformin (JANUMET) 50-1,000 mg per tablet TAKE 1 TABLET BY MOUTH 2 (TWO) TIMES DAILY WITH A MEAL. 180 tablet 1    tretinoin (RETIN-A) 0.025 % cream nightly.       [DISCONTINUED] estradiol (YUVAFEM) 10 mcg Tab Place 10 mcg vaginally.      docusate sodium (COLACE) 100 MG capsule Take 100 mg by mouth once daily at 6am.       No current facility-administered medications on file prior to visit.        OB History      Para Term  AB Living    3 2 2   1 2    SAB TAB Ectopic Multiple Live Births    1       2          Social History     Social History    Marital status:      Spouse name: N/A    Number of children: N/A    Years of education: N/A     Occupational History    Not on file.     Social History Main Topics    Smoking status: Never Smoker    Smokeless tobacco: Never Used    Alcohol use 0.5 oz/week     1 Standard drinks or equivalent per week      Comment: rarely     Drug use: No    Sexual activity: Yes     Partners: Male     Birth control/ protection: Post-menopausal     Other Topics Concern    Not on file     Social History Narrative    No narrative on file       Family History   Problem Relation Age of Onset    Hyperlipidemia Mother     Diabetes Father      Type 1    Cancer Father      lung + tob    Cancer Maternal Aunt  "     breast cancer    Breast cancer Maternal Aunt     Ovarian cancer Neg Hx          Past medical and surgical history reviewed.   I have reviewed the patient's medical history in detail and updated the computerized patient record.        Review of System:   General: no chills, fever, night sweats, weight gain or weight loss  Psychological: no depression or suicidal ideation  Breasts: no new or changing breast lumps, nipple discharge or masses.  Respiratory: no cough, shortness of breath, or wheezing  Cardiovascular: no chest pain or dyspnea on exertion  Gastrointestinal: no abdominal pain, change in bowel habits, or black or bloody stools  Genito-Urinary: no incontinence, urinary frequency/urgency or vulvar/vaginal symptoms, pelvic pain or abnormal vaginal bleeding.  Musculoskeletal: no gait disturbance or muscular weakness      Physical Exam:   BP (!) 150/78   Ht 5' 6" (1.676 m)   Wt 63.2 kg (139 lb 5.3 oz)   LMP 05/23/2012   BMI 22.49 kg/m²   Constitutional: She is oriented to person, place, and time. She appears well-developed and well-nourished. No distress.   HENT:   Head: Normocephalic and atraumatic.   Eyes: Conjunctivae and EOM are normal. No scleral icterus.   Neck: Normal range of motion. Neck supple. No tracheal deviation present.   Cardiovascular: Normal rate.    Pulmonary/Chest: Effort normal. No respiratory distress. She exhibits no tenderness.  Breasts: are symmetrical.   Right breast exhibits no inverted nipple, no mass, no nipple discharge, no skin change and no tenderness.   Left breast exhibits no inverted nipple, no mass, no nipple discharge, no skin change and no tenderness.  Abdominal: Soft. She exhibits no distension and no mass. There is no tenderness. There is no rebound and no guarding.   Genitourinary:  vdeferred  Musculoskeletal: Normal range of motion.   Lymphadenopathy: No inguinal adenopathy present.   Neurological: She is alert and oriented to person, place, and time. " Coordination normal.   Skin: Skin is warm and dry. She is not diaphoretic.   Psychiatric: She has a normal mood and affect.        Assessment:   Normal annual GYN exam  1. Visit for screening mammogram  CANCELED: Mammo Digital Screening Bilat With CAD   2. Visit for gynecologic examination         Plan:   PAP not needed, no vaginal complaints   Mammogram  Follow up in 1 year   Refill vaginal Hormone Replacement Therapy .

## 2018-02-26 RX ORDER — ESTRADIOL 10 UG/1
TABLET VAGINAL
Qty: 26 TABLET | Refills: 3 | Status: SHIPPED | OUTPATIENT
Start: 2018-02-26 | End: 2018-05-14 | Stop reason: SDUPTHER

## 2018-05-09 ENCOUNTER — PATIENT MESSAGE (OUTPATIENT)
Dept: FAMILY MEDICINE | Facility: CLINIC | Age: 68
End: 2018-05-09

## 2018-05-14 ENCOUNTER — TELEPHONE (OUTPATIENT)
Dept: FAMILY MEDICINE | Facility: CLINIC | Age: 68
End: 2018-05-14

## 2018-05-14 ENCOUNTER — OFFICE VISIT (OUTPATIENT)
Dept: FAMILY MEDICINE | Facility: CLINIC | Age: 68
End: 2018-05-14
Payer: MEDICARE

## 2018-05-14 ENCOUNTER — PATIENT MESSAGE (OUTPATIENT)
Dept: FAMILY MEDICINE | Facility: CLINIC | Age: 68
End: 2018-05-14

## 2018-05-14 VITALS
SYSTOLIC BLOOD PRESSURE: 108 MMHG | WEIGHT: 140 LBS | DIASTOLIC BLOOD PRESSURE: 68 MMHG | HEIGHT: 66 IN | HEART RATE: 86 BPM | OXYGEN SATURATION: 98 % | BODY MASS INDEX: 22.5 KG/M2 | TEMPERATURE: 98 F

## 2018-05-14 DIAGNOSIS — K62.5 RECTAL BLEEDING: ICD-10-CM

## 2018-05-14 DIAGNOSIS — E11.8 TYPE 2 DIABETES MELLITUS WITH COMPLICATION, WITHOUT LONG-TERM CURRENT USE OF INSULIN: ICD-10-CM

## 2018-05-14 DIAGNOSIS — K64.8 INTERNAL HEMORRHOIDS: Primary | ICD-10-CM

## 2018-05-14 DIAGNOSIS — E11.69 HYPERLIPIDEMIA ASSOCIATED WITH TYPE 2 DIABETES MELLITUS: Primary | ICD-10-CM

## 2018-05-14 DIAGNOSIS — Z86.39 PERSONAL HISTORY OF OTHER ENDOCRINE, NUTRITIONAL AND METABOLIC DISEASE: ICD-10-CM

## 2018-05-14 DIAGNOSIS — E78.5 HYPERLIPIDEMIA ASSOCIATED WITH TYPE 2 DIABETES MELLITUS: Primary | ICD-10-CM

## 2018-05-14 DIAGNOSIS — E55.9 VITAMIN D DEFICIENCY: ICD-10-CM

## 2018-05-14 PROCEDURE — 99214 OFFICE O/P EST MOD 30 MIN: CPT | Mod: S$GLB,,, | Performed by: NURSE PRACTITIONER

## 2018-05-14 PROCEDURE — 3078F DIAST BP <80 MM HG: CPT | Mod: CPTII,S$GLB,, | Performed by: NURSE PRACTITIONER

## 2018-05-14 PROCEDURE — 3074F SYST BP LT 130 MM HG: CPT | Mod: CPTII,S$GLB,, | Performed by: NURSE PRACTITIONER

## 2018-05-14 RX ORDER — ACETAMINOPHEN 500 MG
1 TABLET ORAL DAILY
COMMUNITY
End: 2019-02-26

## 2018-05-14 NOTE — PROGRESS NOTES
Subjective:       Patient ID: Katherine Rivers is a 68 y.o. female.    Chief Complaint: Rectal Bleeding    HPI Answers for HPI/ROS submitted by the patient on 5/14/2018   activity change: No  unexpected weight change: No  neck pain: No  hearing loss: Yes  rhinorrhea: No  trouble swallowing: No  eye discharge: No  visual disturbance: No  chest tightness: No  wheezing: No  chest pain: No  palpitations: No  blood in stool: Yes  constipation: No  vomiting: No  diarrhea: Yes  polydipsia: No  polyuria: No  difficulty urinating: No  hematuria: No  menstrual problem: No  dysuria: No  joint swelling: No  arthralgias: No  headaches: No  weakness: No  confusion: No  dysphoric mood: No    New patient to me. Here with concerns regarding rectal bleeding. She had colonoscopy in 2016 that showed multiple polyps, internal hemorrhoids and diverticulosis. Since having her gallbladder removed she has more loose stools. Thursday had bright red blood when she wiped herself. But none since then and none before then. States she has had a little spotting at times but nothing that lasts. She just wanted to make sure that this was her hemorrhoids and not something else to be worried about. She denies any weakness, dizziness, fatigue. No rectal pain. No trauma to the rectal area. States the blood was only on her tissue paper. She denies any stomach cramping or lower abdominal pain. No other symptoms or concerns. See ROS.    The following portion of the patients history was reviewed and updated as appropriate: allergies, current medications, past medical and surgical history. Past social history and problem list reviewed. Family PMH and Past social history reviewed. Tobacco, Illicit drug use reviewed.     Review of Systems   Constitutional: Negative for activity change, fatigue and unexpected weight change.   HENT: Positive for hearing loss (chronic). Negative for rhinorrhea and trouble swallowing.    Eyes: Negative for discharge and visual  "disturbance.   Respiratory: Negative for cough, chest tightness and wheezing.    Cardiovascular: Negative for chest pain and palpitations.   Gastrointestinal: Positive for anal bleeding and diarrhea (common since gallbladder removed 2 years ago). Negative for abdominal pain, blood in stool, constipation, nausea and vomiting.   Endocrine: Negative for polydipsia and polyuria.   Genitourinary: Negative for difficulty urinating, dysuria, hematuria and menstrual problem.   Musculoskeletal: Negative for arthralgias, joint swelling and neck pain.   Neurological: Negative for weakness and headaches.   Psychiatric/Behavioral: Negative for confusion and dysphoric mood.       Objective:     /68   Pulse 86   Temp 98.2 °F (36.8 °C) (Oral)   Ht 5' 6" (1.676 m)   Wt 63.5 kg (140 lb)   LMP 05/23/2012   SpO2 98%   BMI 22.60 kg/m²      Physical Exam     Constitutional: oriented to person, place, and time. well-developed and well-nourished.   Neck: Normal range of motion. Neck supple. No tracheal deviation present. No thyromegaly present.   Cardiovascular: Normal rate, regular rhythm and normal heart sounds.    Pulmonary/Chest: Effort normal and breath sounds normal. No respiratory distress. No wheezes.   Abdominal: Soft. Bowel sounds are normal. No distension. There is no tenderness. she does not want rectal exam done. She has no LLQ abdominal pain.   Musculoskeletal: Normal range of motion. Gait and coordination normal   Neurological: oriented to person, place, and time.   Skin: Skin is warm and dry. No rashes or lesions  Psychiatric: Normal mood and affect.Behavior is normal. Judgment and thought content normal.   Assessment:       1. Internal hemorrhoids    2. Rectal bleeding        Plan:         Katherine was seen today for rectal bleeding.    Diagnoses and all orders for this visit:    Internal hemorrhoids: I explained this is most likely related to irritation to internal hemorrhoids. She is  Not having any symptoms of " anemia. She is not having in chronic blood loss or blood in her stools. No black tarry stools. She has no rectal pain. Only the one episode. She is current on her colonoscopy. I recommend she monitor closely.  If any of the above symptoms occur or the bleeding gets heavier, more frequent to call right away.     Continue current medication  Take medications only as prescribed  Healthy diet, exercise  Adequate rest  Adequate hydration  Avoid allergens  Avoid excessive caffeine    Rectal bleeding

## 2018-05-14 NOTE — PROGRESS NOTES
Answers for HPI/ROS submitted by the patient on 5/14/2018   activity change: No  unexpected weight change: No  neck pain: No  hearing loss: Yes  rhinorrhea: No  trouble swallowing: No  eye discharge: No  visual disturbance: No  chest tightness: No  wheezing: No  chest pain: No  palpitations: No  blood in stool: Yes  constipation: No  vomiting: No  diarrhea: Yes  polydipsia: No  polyuria: No  difficulty urinating: No  hematuria: No  menstrual problem: No  dysuria: No  joint swelling: No  arthralgias: No  headaches: No  weakness: No  confusion: No  dysphoric mood: No

## 2018-05-15 NOTE — TELEPHONE ENCOUNTER
I spoke with  [facility] regarding where labs were to be done. He stated through [facility].  I assured him that the order for [facility] are in.  He verbalized understanding.

## 2018-05-25 DIAGNOSIS — Z13.5 DIABETIC RETINOPATHY SCREENING: ICD-10-CM

## 2018-06-05 ENCOUNTER — TELEPHONE (OUTPATIENT)
Dept: FAMILY MEDICINE | Facility: CLINIC | Age: 68
End: 2018-06-05

## 2018-06-05 ENCOUNTER — PATIENT OUTREACH (OUTPATIENT)
Dept: ADMINISTRATIVE | Facility: HOSPITAL | Age: 68
End: 2018-06-05

## 2018-06-05 NOTE — TELEPHONE ENCOUNTER
----- Message from Dayron Joe sent at 6/5/2018 11:47 AM CDT -----  Contact: Patient  Type:  Patient Returning Call    Who Called:  Katherine  Who Left Message for Patient:  Sara  Does the patient know what this is regarding?:  Lab work  Best Call Back Number:  [phone]  Additional Information:  Says it's scheduled for Friday at [facility]

## 2018-06-05 NOTE — Clinical Note
Left detailed VM to [facility] and inquire if she completed her labs at [facility].   Contacted [facility], no record in system.

## 2018-06-05 NOTE — PROGRESS NOTES
Left detailed VM to [facility] and inquire if she completed her labs at [facility].   Contacted [facility], no record in system.

## 2018-06-11 ENCOUNTER — PATIENT MESSAGE (OUTPATIENT)
Dept: FAMILY MEDICINE | Facility: CLINIC | Age: 68
End: 2018-06-11

## 2018-06-12 ENCOUNTER — OFFICE VISIT (OUTPATIENT)
Dept: FAMILY MEDICINE | Facility: CLINIC | Age: 68
End: 2018-06-12
Payer: MEDICARE

## 2018-06-12 VITALS
HEIGHT: 66 IN | DIASTOLIC BLOOD PRESSURE: 80 MMHG | RESPIRATION RATE: 18 BRPM | SYSTOLIC BLOOD PRESSURE: 124 MMHG | HEART RATE: 80 BPM | BODY MASS INDEX: 22.85 KG/M2 | WEIGHT: 142.19 LBS | TEMPERATURE: 98 F

## 2018-06-12 DIAGNOSIS — E11.8 TYPE 2 DIABETES MELLITUS WITH COMPLICATION, UNSPECIFIED WHETHER LONG TERM INSULIN USE: Primary | ICD-10-CM

## 2018-06-12 LAB
1,25(OH)2D SERPL-MCNC: 30 PG/ML (ref 18–72)
1,25(OH)2D2 SERPL-MCNC: <8 PG/ML
1,25(OH)2D3 SERPL-MCNC: 30 PG/ML
ALBUMIN SERPL-MCNC: 4.5 G/DL (ref 3.6–5.1)
ALBUMIN/GLOB SERPL: 1.9 (CALC) (ref 1–2.5)
ALP SERPL-CCNC: 58 U/L (ref 33–130)
ALT SERPL-CCNC: 23 U/L (ref 6–29)
AST SERPL-CCNC: 23 U/L (ref 10–35)
BASOPHILS # BLD AUTO: 97 CELLS/UL (ref 0–200)
BASOPHILS NFR BLD AUTO: 1.1 %
BILIRUB SERPL-MCNC: 0.4 MG/DL (ref 0.2–1.2)
BUN SERPL-MCNC: 16 MG/DL (ref 7–25)
BUN/CREAT SERPL: ABNORMAL (CALC) (ref 6–22)
CALCIUM SERPL-MCNC: 10.3 MG/DL (ref 8.6–10.4)
CHLORIDE SERPL-SCNC: 102 MMOL/L (ref 98–110)
CHOLEST SERPL-MCNC: 166 MG/DL
CHOLEST/HDLC SERPL: 3.1 (CALC)
CO2 SERPL-SCNC: 28 MMOL/L (ref 20–31)
CREAT SERPL-MCNC: 0.74 MG/DL (ref 0.5–0.99)
CREAT UR-MCNC: 22 MG/DL
EOSINOPHIL # BLD AUTO: 273 CELLS/UL (ref 15–500)
EOSINOPHIL NFR BLD AUTO: 3.1 %
ERYTHROCYTE [DISTWIDTH] IN BLOOD BY AUTOMATED COUNT: 14.2 % (ref 11–15)
GFR SERPL CREATININE-BSD FRML MDRD: 84 ML/MIN/1.73M2
GLOBULIN SER CALC-MCNC: 2.4 G/DL (CALC) (ref 1.9–3.7)
GLUCOSE SERPL-MCNC: 120 MG/DL (ref 65–99)
HBA1C MFR BLD: 6.2 % OF TOTAL HGB
HCT VFR BLD AUTO: 39.9 % (ref 35–45)
HDLC SERPL-MCNC: 54 MG/DL
HGB BLD-MCNC: 12.7 G/DL (ref 11.7–15.5)
LDLC SERPL CALC-MCNC: 89 MG/DL (CALC)
LYMPHOCYTES # BLD AUTO: 3036 CELLS/UL (ref 850–3900)
LYMPHOCYTES NFR BLD AUTO: 34.5 %
MCH RBC QN AUTO: 25.8 PG (ref 27–33)
MCHC RBC AUTO-ENTMCNC: 31.8 G/DL (ref 32–36)
MCV RBC AUTO: 80.9 FL (ref 80–100)
MICROALBUMIN UR DL<=1MG/L-MCNC: 11 UG/ML
MICROALBUMIN/CREATININE RATIO: 50 UG/MG
MONOCYTES # BLD AUTO: 818 CELLS/UL (ref 200–950)
MONOCYTES NFR BLD AUTO: 9.3 %
NEUTROPHILS # BLD AUTO: 4576 CELLS/UL (ref 1500–7800)
NEUTROPHILS NFR BLD AUTO: 52 %
NONHDLC SERPL-MCNC: 112 MG/DL (CALC)
PLATELET # BLD AUTO: 327 THOUSAND/UL (ref 140–400)
PMV BLD REES-ECKER: 10.3 FL (ref 7.5–12.5)
POTASSIUM SERPL-SCNC: 4.5 MMOL/L (ref 3.5–5.3)
PROT SERPL-MCNC: 6.9 G/DL (ref 6.1–8.1)
RBC # BLD AUTO: 4.93 MILLION/UL (ref 3.8–5.1)
SODIUM SERPL-SCNC: 140 MMOL/L (ref 135–146)
TRIGL SERPL-MCNC: 135 MG/DL
VIT B12 SERPL-MCNC: 311 PG/ML (ref 200–1100)
WBC # BLD AUTO: 8.8 THOUSAND/UL (ref 3.8–10.8)

## 2018-06-12 PROCEDURE — 3044F HG A1C LEVEL LT 7.0%: CPT | Mod: CPTII,S$GLB,, | Performed by: FAMILY MEDICINE

## 2018-06-12 PROCEDURE — 3079F DIAST BP 80-89 MM HG: CPT | Mod: CPTII,S$GLB,, | Performed by: FAMILY MEDICINE

## 2018-06-12 PROCEDURE — G0009 ADMIN PNEUMOCOCCAL VACCINE: HCPCS | Mod: S$GLB,,, | Performed by: FAMILY MEDICINE

## 2018-06-12 PROCEDURE — 99499 UNLISTED E&M SERVICE: CPT | Mod: S$GLB,,, | Performed by: FAMILY MEDICINE

## 2018-06-12 PROCEDURE — 3074F SYST BP LT 130 MM HG: CPT | Mod: CPTII,S$GLB,, | Performed by: FAMILY MEDICINE

## 2018-06-12 PROCEDURE — 99213 OFFICE O/P EST LOW 20 MIN: CPT | Mod: S$GLB,,, | Performed by: FAMILY MEDICINE

## 2018-06-12 PROCEDURE — 82043 UR ALBUMIN QUANTITATIVE: CPT

## 2018-06-12 PROCEDURE — 90732 PPSV23 VACC 2 YRS+ SUBQ/IM: CPT | Mod: S$GLB,,, | Performed by: FAMILY MEDICINE

## 2018-06-12 RX ORDER — METFORMIN HYDROCHLORIDE 1000 MG/1
1000 TABLET ORAL 2 TIMES DAILY WITH MEALS
Qty: 180 TABLET | Refills: 1 | Status: SHIPPED | OUTPATIENT
Start: 2018-06-12 | End: 2018-10-09

## 2018-06-12 NOTE — PROGRESS NOTES
Subjective:       Patient ID: Katherine Rivers is a 68 y.o. female.    Chief Complaint: Follow-up    HPI   The patient is 68-year-old who is here today for chronic follow-up.  Overall, she is doing well.  She recently had labs which were reviewed today    Today we discussed the followin)  Diabetes.  She checks her sugars 2 or 3 times a week and her readings are usually good at home.  She does watch carbohydrate intake.  She does stay physically active.  Her recent A1c was 6.2.  She is currently taking Janumet.  The  Janumet is expensive as she is now in the [facility] for her insurance coverage.  She is due for urine microalbumin and would be willing to provide a sample for that today    2)  Hyperlipidemia.  She is doing well with her Lipitor.  Her recent LDL was 89    Review of Systems   Constitutional: Negative for appetite change, chills, diaphoresis, fatigue, fever and unexpected weight change.   HENT: Negative for congestion, ear pain, postnasal drip, rhinorrhea, sinus pressure, sneezing, sore throat and trouble swallowing.    Eyes: Negative for pain, discharge and visual disturbance.   Respiratory: Negative for cough, chest tightness, shortness of breath and wheezing.    Cardiovascular: Negative for chest pain, palpitations and leg swelling.   Gastrointestinal: Negative for abdominal distention, abdominal pain, blood in stool, constipation, diarrhea, nausea and vomiting.   Endocrine: Negative for polydipsia, polyphagia and polyuria.   Skin: Negative for pallor and rash.   Neurological: Negative for dizziness, tremors, seizures, speech difficulty, weakness and headaches.   Psychiatric/Behavioral: Negative for confusion. The patient is not nervous/anxious.        Objective:      Physical Exam   Constitutional: She is oriented to person, place, and time. She appears well-developed and well-nourished. No distress.   HENT:   Head: Normocephalic and atraumatic.   Right Ear: Hearing, tympanic membrane,  external ear and ear canal normal.   Left Ear: Hearing, tympanic membrane, external ear and ear canal normal.   Nose: Nose normal.   Mouth/Throat: Oropharynx is clear and moist and mucous membranes are normal. No oral lesions. No oropharyngeal exudate, posterior oropharyngeal edema or posterior oropharyngeal erythema.   Eyes: Conjunctivae, EOM and lids are normal. Pupils are equal, round, and reactive to light. No scleral icterus.   Neck: Normal range of motion. Neck supple. Carotid bruit is not present. No thyroid mass and no thyromegaly present.   Cardiovascular: Normal rate, regular rhythm and normal heart sounds.   No extrasystoles are present. PMI is not displaced.  Exam reveals no gallop.    No murmur heard.  Pulses:       Dorsalis pedis pulses are 2+ on the right side, and 2+ on the left side.        Posterior tibial pulses are 2+ on the right side, and 2+ on the left side.   Pulmonary/Chest: Effort normal and breath sounds normal. No accessory muscle usage. No respiratory distress.   Clear to auscultation bilaterally.   Abdominal: Soft. Normal appearance and bowel sounds are normal. She exhibits no abdominal bruit. There is no hepatosplenomegaly. There is no tenderness. There is no rebound.   Musculoskeletal:        Right foot: There is no deformity.        Left foot: There is no deformity.   Feet:   Right Foot:   Protective Sensation: 10 sites tested. 10 sites sensed.   Skin Integrity: Positive for warmth. Negative for ulcer or callus.   Left Foot:   Protective Sensation: 10 sites tested. 10 sites sensed.   Skin Integrity: Positive for warmth. Negative for ulcer or callus.   Lymphadenopathy:        Head (right side): No submental and no submandibular adenopathy present.        Head (left side): No submental and no submandibular adenopathy present.        Right cervical: No superficial cervical, no deep cervical and no posterior cervical adenopathy present.       Left cervical: No superficial cervical, no  "deep cervical and no posterior cervical adenopathy present.        Right: No supraclavicular adenopathy present.        Left: No supraclavicular adenopathy present.   Neurological: She is alert and oriented to person, place, and time.   Skin: Skin is warm, dry and intact.   Psychiatric: She has a normal mood and affect.     Blood pressure 124/80, pulse 80, temperature 97.9 °F (36.6 °C), temperature source Oral, resp. rate 18, height 5' 6" (1.676 m), weight 64.5 kg (142 lb 3.2 oz), last menstrual period 05/23/2012.Body mass index is 22.95 kg/m².            A/P:  1)  Diabetes.  Well controlled.   For now, she will continue with Janumet.  With her next refill, we will consider separate prescriptions for Januvia and metformin in case these are less expensive than the Janumet.  If her sugars at home consistently higher than 140, she will let me know.  We will plan to recheck an A1c in 6 months.  We will check urine microalbumin    2)  Hyperlipidemia.  Well controlled.  Continue Lipitor.  We will check fasting labs again in 6 months    3)  Health maintenance issues.  We will administer Pneumovax today  "

## 2018-06-14 ENCOUNTER — PATIENT MESSAGE (OUTPATIENT)
Dept: FAMILY MEDICINE | Facility: CLINIC | Age: 68
End: 2018-06-14

## 2018-06-14 DIAGNOSIS — E11.8 TYPE 2 DIABETES MELLITUS WITH COMPLICATION, WITHOUT LONG-TERM CURRENT USE OF INSULIN: Primary | ICD-10-CM

## 2018-06-15 NOTE — TELEPHONE ENCOUNTER
I spoke with Ms. Murphy and gave her these results. She verbalized understanding and appointment made for a follow up UA in one month.

## 2018-06-20 NOTE — PROGRESS NOTES
[facility] scanned into media.      Eye exam

## 2018-07-04 RX ORDER — ATORVASTATIN CALCIUM 20 MG/1
20 TABLET, FILM COATED ORAL DAILY
Qty: 90 TABLET | Refills: 1 | Status: SHIPPED | OUTPATIENT
Start: 2018-07-04 | End: 2018-12-11 | Stop reason: SDUPTHER

## 2018-07-16 ENCOUNTER — LAB VISIT (OUTPATIENT)
Dept: LAB | Facility: HOSPITAL | Age: 68
End: 2018-07-16
Attending: FAMILY MEDICINE
Payer: MEDICARE

## 2018-07-16 ENCOUNTER — PATIENT MESSAGE (OUTPATIENT)
Dept: FAMILY MEDICINE | Facility: CLINIC | Age: 68
End: 2018-07-16

## 2018-07-16 DIAGNOSIS — E11.8 TYPE 2 DIABETES MELLITUS WITH COMPLICATION, WITHOUT LONG-TERM CURRENT USE OF INSULIN: ICD-10-CM

## 2018-07-16 LAB
BACTERIA #/AREA URNS HPF: NORMAL /HPF
BILIRUB UR QL STRIP: NEGATIVE
CLARITY UR: CLEAR
COLOR UR: YELLOW
CREAT UR-MCNC: 35 MG/DL
GLUCOSE UR QL STRIP: NEGATIVE
HGB UR QL STRIP: NEGATIVE
KETONES UR QL STRIP: NEGATIVE
LEUKOCYTE ESTERASE UR QL STRIP: ABNORMAL
MICROALBUMIN UR DL<=1MG/L-MCNC: 6 UG/ML
MICROALBUMIN/CREATININE RATIO: 17.1 UG/MG
MICROSCOPIC COMMENT: NORMAL
NITRITE UR QL STRIP: NEGATIVE
PH UR STRIP: 6 [PH] (ref 5–8)
PROT UR QL STRIP: NEGATIVE
RBC #/AREA URNS HPF: 1 /HPF (ref 0–4)
SP GR UR STRIP: <=1.005 (ref 1–1.03)
SQUAMOUS #/AREA URNS HPF: 2 /HPF
URN SPEC COLLECT METH UR: ABNORMAL
WBC #/AREA URNS HPF: 5 /HPF (ref 0–5)

## 2018-07-16 PROCEDURE — 81000 URINALYSIS NONAUTO W/SCOPE: CPT | Mod: PO

## 2018-07-16 PROCEDURE — 82043 UR ALBUMIN QUANTITATIVE: CPT

## 2018-08-17 ENCOUNTER — PATIENT MESSAGE (OUTPATIENT)
Dept: FAMILY MEDICINE | Facility: CLINIC | Age: 68
End: 2018-08-17

## 2018-08-17 DIAGNOSIS — Z12.39 SCREENING FOR BREAST CANCER: Primary | ICD-10-CM

## 2018-08-20 NOTE — TELEPHONE ENCOUNTER
Pt requesting Mammo to be done at [facility] in [place] on August 21st. Order pended.

## 2018-08-20 NOTE — TELEPHONE ENCOUNTER
Phoned patient to inform. Message left with spouse who states patient will schedule at [facility] 8/21/18

## 2018-08-21 ENCOUNTER — TELEPHONE (OUTPATIENT)
Dept: FAMILY MEDICINE | Facility: CLINIC | Age: 68
End: 2018-08-21

## 2018-08-21 NOTE — TELEPHONE ENCOUNTER
----- Message from Jackie Castro sent at 8/21/2018  1:02 PM CDT -----  Contact: Emperatriz from [facility]  Calling as patient is there for MAMMO but order has not been sent. Please fax to [phone]. Thanks!

## 2018-08-27 ENCOUNTER — PATIENT MESSAGE (OUTPATIENT)
Dept: FAMILY MEDICINE | Facility: CLINIC | Age: 68
End: 2018-08-27

## 2018-08-28 ENCOUNTER — TELEPHONE (OUTPATIENT)
Dept: FAMILY MEDICINE | Facility: CLINIC | Age: 68
End: 2018-08-28

## 2018-08-28 RX ORDER — OMEPRAZOLE 40 MG/1
40 CAPSULE, DELAYED RELEASE ORAL DAILY
Qty: 90 CAPSULE | Refills: 1 | Status: SHIPPED | OUTPATIENT
Start: 2018-08-28 | End: 2018-12-11

## 2018-08-28 NOTE — TELEPHONE ENCOUNTER
"Pharmacy is requesting a medictation change:    From Esomeprazole Mag 40 mg     TO: Omeprazole 40 mg  It should be "0" copay with ins.   Please adives.  "

## 2018-09-01 ENCOUNTER — TELEPHONE (OUTPATIENT)
Dept: ADMINISTRATIVE | Facility: HOSPITAL | Age: 68
End: 2018-09-01

## 2018-09-01 NOTE — TELEPHONE ENCOUNTER
Updated [facility] with external report, scanned into media.      Mammogram

## 2018-10-02 ENCOUNTER — PATIENT MESSAGE (OUTPATIENT)
Dept: FAMILY MEDICINE | Facility: CLINIC | Age: 68
End: 2018-10-02

## 2018-10-09 ENCOUNTER — OFFICE VISIT (OUTPATIENT)
Dept: FAMILY MEDICINE | Facility: CLINIC | Age: 68
End: 2018-10-09
Payer: MEDICARE

## 2018-10-09 ENCOUNTER — LAB VISIT (OUTPATIENT)
Dept: LAB | Facility: HOSPITAL | Age: 68
End: 2018-10-09
Attending: FAMILY MEDICINE
Payer: MEDICARE

## 2018-10-09 VITALS
TEMPERATURE: 99 F | HEART RATE: 80 BPM | RESPIRATION RATE: 18 BRPM | HEIGHT: 66 IN | SYSTOLIC BLOOD PRESSURE: 118 MMHG | WEIGHT: 139.63 LBS | BODY MASS INDEX: 22.44 KG/M2 | DIASTOLIC BLOOD PRESSURE: 66 MMHG

## 2018-10-09 DIAGNOSIS — R19.7 DIARRHEA, UNSPECIFIED TYPE: ICD-10-CM

## 2018-10-09 DIAGNOSIS — R19.7 DIARRHEA, UNSPECIFIED TYPE: Primary | ICD-10-CM

## 2018-10-09 LAB
C DIFF GDH STL QL: NEGATIVE
C DIFF TOX A+B STL QL IA: NEGATIVE
OB PNL STL: NEGATIVE

## 2018-10-09 PROCEDURE — 3074F SYST BP LT 130 MM HG: CPT | Mod: CPTII,S$GLB,, | Performed by: FAMILY MEDICINE

## 2018-10-09 PROCEDURE — 87046 STOOL CULTR AEROBIC BACT EA: CPT | Mod: 59

## 2018-10-09 PROCEDURE — 87324 CLOSTRIDIUM AG IA: CPT

## 2018-10-09 PROCEDURE — 82272 OCCULT BLD FECES 1-3 TESTS: CPT

## 2018-10-09 PROCEDURE — 3078F DIAST BP <80 MM HG: CPT | Mod: CPTII,S$GLB,, | Performed by: FAMILY MEDICINE

## 2018-10-09 PROCEDURE — 89055 LEUKOCYTE ASSESSMENT FECAL: CPT

## 2018-10-09 PROCEDURE — 87209 SMEAR COMPLEX STAIN: CPT

## 2018-10-09 PROCEDURE — 87045 FECES CULTURE AEROBIC BACT: CPT

## 2018-10-09 PROCEDURE — 99213 OFFICE O/P EST LOW 20 MIN: CPT | Mod: S$GLB,,, | Performed by: FAMILY MEDICINE

## 2018-10-09 PROCEDURE — 87427 SHIGA-LIKE TOXIN AG IA: CPT | Mod: 59

## 2018-10-09 PROCEDURE — 1101F PT FALLS ASSESS-DOCD LE1/YR: CPT | Mod: CPTII,S$GLB,, | Performed by: FAMILY MEDICINE

## 2018-10-09 RX ORDER — ESOMEPRAZOLE MAGNESIUM 40 MG/1
CAPSULE, DELAYED RELEASE ORAL
Refills: 11 | COMMUNITY
Start: 2018-09-05 | End: 2018-12-11

## 2018-10-09 NOTE — MEDICAL/APP STUDENT
"[facility]  FAMILY MEDICINE  History & Physical    Patient Name: Katherine Rivers  MRN: [id number]  Primary Care Provider: Jennifer Casillas MD    Subjective:       Patient ID: Katherine Rivers is a 68 y.o. female.    Chief Complaint: Diarrhea (Bad for about three weeks: Declined Flu for now)    Katherine Rivers, a 68 year old female with a PMHX of IBS and PSHx of cholecystectomy, presents with 3 week history of watery, non-bloody diarrhea.  Katherine describes the diarrhea as increasing in frequency and in volume. Today, she had 4 bowel movement in 30 minutes. She reports she doesn't have diarrhea everyday, but at least 1-2/week. She doesn't think she has had 1 "normal" bowel movement within the past 3 weeks. She can't think of any event that could have triggered it. No one in the house or near her has been sick. She denies any recent stressors within the past month.The diarrhea is worsened by salads, coffee, and oatmeal. She also endorses bloating and "a loud stomach". She has had no abdominal pain, blood in stool, nausea or vomiting, fevers, or constipation. She also denies any changes in appetite; she continues to eat well every day.  Pepto bismol has helped with diarrhea a bit.     Katherine wishes for something to stop the flares for she is going on a trip at the end of month. She asks about taking Welchol, which has the side effect of constipation.     Katherine has a history of GERD, which she says has significantly improved. She had a cholecystectomy in 2016.       Review of Systems   Constitutional: Negative for appetite change, chills, diaphoresis, fatigue, fever and unexpected weight change.   HENT: Negative for congestion, ear pain, hearing loss, postnasal drip, rhinorrhea, sinus pressure and sore throat.    Eyes: Negative for photophobia, pain, redness and visual disturbance.   Respiratory: Negative for cough, chest tightness, shortness of breath and wheezing.    Cardiovascular: Negative for chest pain and " leg swelling.   Gastrointestinal: Positive for diarrhea. Negative for abdominal pain, blood in stool, constipation, nausea and vomiting.        Bloating; louder bowel sounds   Endocrine: Negative for polydipsia and polyuria.   Genitourinary: Negative for difficulty urinating, dysuria, flank pain, frequency, hematuria, urgency, vaginal bleeding and vaginal pain.   Musculoskeletal: Negative for arthralgias, gait problem, joint swelling and myalgias.   Neurological: Negative for dizziness, syncope, weakness, light-headedness and headaches.   Psychiatric/Behavioral: Negative for behavioral problems, confusion, dysphoric mood and sleep disturbance. The patient is not nervous/anxious.        Past Medical History:   Diagnosis Date    Bilateral hearing loss     wears hearing aides    Cystocele     Diabetes mellitus     dx 57    Diverticular disease     General anesthetics causing adverse effect in therapeutic use     faints after surgery    GERD (gastroesophageal reflux disease)     H/O esophagogastroduodenoscopy     normal 3/16    History of colonoscopy     3/16; next due 3/21    Hyperlipidemia LDL goal < 100     Irritable bowel syndrome     Osteopenia     Dexa 4/11 and 7/15       Current Outpatient Medications:     atorvastatin (LIPITOR) 20 MG tablet, TAKE 1 TABLET (20 MG TOTAL) BY MOUTH ONCE DAILY., Disp: 90 tablet, Rfl: 1    blood sugar diagnostic (ONETOUCH ULTRA TEST) Strp, Check once daily, Disp: 150 strip, Rfl: 3    blood-glucose meter kit, 1 each by Other route once daily. Use as instructed, Disp: , Rfl:     cholecalciferol, vitamin D3, (VITAMIN D3) 2,000 unit Cap, Take 1 capsule by mouth once daily., Disp: , Rfl:     esomeprazole (NEXIUM) 40 MG capsule, Take by mouth before breakfast., Disp: , Rfl: 11    estradiol (YUVAFEM) 10 mcg Tab, Place 1 tablet (10 mcg total) vaginally twice a week., Disp: 24 tablet, Rfl: 3    L. ACIDOPHILUS/BIFID. ANIMALIS (ONE-A-DAY TRUBIOTICS ORAL), Take 1 tablet by mouth  once daily., Disp: , Rfl:     lancets (ONETOUCH ULTRASOFT LANCETS) Misc, Pt to check blood sugar 2x daily., Disp: 100 each, Rfl: 5    MULTIVITAMIN WITH MINERALS (HAIR,SKIN & NAILS ORAL), Take 1 tablet by mouth 2 (two) times daily.  , Disp: , Rfl:     SITagliptan-metformin (JANUMET) 50-1,000 mg per tablet, TAKE 1 TABLET BY MOUTH 2 (TWO) TIMES DAILY WITH A MEAL., Disp: 180 tablet, Rfl: 1    tretinoin (RETIN-A) 0.025 % cream, nightly. , Disp: , Rfl:     aspirin (ECOTRIN) 81 MG EC tablet, Take 81 mg by mouth once daily., Disp: , Rfl:     omeprazole (PRILOSEC) 40 MG capsule, Take 1 capsule (40 mg total) by mouth once daily., Disp: 90 capsule, Rfl: 1    Past Surgical History:   Procedure Laterality Date    BELT ABDOMINOPLASTY      BILATERAL SALPINGOOPHORECTOMY      BLADDER SUSPENSION  2/23/12     x 3    CERVICAL POLYPECTOMY      CHOLECYSTECTOMY      CHOLECYSTECTOMY-LAPAROSCOPIC N/A 5/11/2016    [facility] at [facility]    COLONOSCOPY  1/2006    by Dr. lauren barcenas (report in media section) diverticulosis, otherwise normal findings, repeat in 10 years for screening    COLONOSCOPY N/A 3/8/2016    Procedure: COLONOSCOPY;  Surgeon: Erickson Uribe Jr., MD;  Location: [facility] ENDO;  Service: Endoscopy;  Laterality: N/A;    COLONOSCOPY N/A 3/8/2016    Performed by Erickson Uribe Jr., MD at [facility] ENDO    COLPORRHAPHY      CYSTOSCOPY N/A 5/20/2014    Performed by Jeremias Plascencia [facility] at [facility] OR    CYSTOSCOPY N/A 5/2/2012    Performed by Iveth Payne [facility] at [facility] OR    dental implant      ESOPHAGOGASTRODUODENOSCOPY (EGD) N/A 3/8/2016    Performed by Erickson Uribe Jr., MD at [facility] ENDO    HYSTERECT[facility]Y  2011    [facility]    HYSTERECTOMY, TOTAL, LAPAROSCOPIC N/A 5/2/2012    Performed by Iveth Payne [facility] at [facility] OR    OOPHORECTOMY  2011    bilat    REPAIR-ANTERIOR (COLPORRHAPHY) N/A 5/20/2014    Per[facility] Jerem[facility] at [facility] OR    REPAIR-ANTERIOR-POSTERIOR N/A  5/20/2014    [facility]    SALPINGO-OOPHORECTOMY, LAPAROSCOPIC Bilateral 5/2/2012    Per[facility] at [facility] OR    VAGINAL DELIVERY      times 2    VAGINAL HYSTERECTOMY W/ ANTERIOR AND POSTERIOR VAGINAL REPAIR  05/20/14     Objective:       Vitals:    10/09/18 1003   BP: 118/66   Pulse: 80   Resp: 18   Temp: 98.9 °F (37.2 °C)       Physical Exam   Constitutional: She is oriented to person, place, and time. She appears well-developed and well-nourished. No distress.   HENT:   Head: Atraumatic.   Right Ear: External ear normal.   Left Ear: External ear normal.   Mouth/Throat: Oropharynx is clear and moist.   Cerumen impaction   Eyes: Conjunctivae and EOM are normal. Pupils are equal, round, and reactive to light.   Neck: Normal range of motion. Neck supple. No thyromegaly present.   Cardiovascular: Normal rate, regular rhythm and normal heart sounds.   No murmur heard.  Pulmonary/Chest: Effort normal and breath sounds normal. She has no wheezes. She has no rales.   Abdominal: Soft. She exhibits no mass. There is no tenderness.   Increased bowel sounds   Musculoskeletal: Normal range of motion. She exhibits no edema or tenderness.   Lymphadenopathy:     She has no cervical adenopathy.   Neurological: She is alert and oriented to person, place, and time. She displays normal reflexes. No cranial nerve deficit. Coordination normal.   Skin: Skin is warm and dry. Capillary refill takes less than 2 seconds.   Psychiatric: She has a normal mood and affect.       Assessment:     Katherine Rivers is a 68 year old female who presents with recurrent non-bloody diarrhea. Differential Diagnoses for diarrhea include:    - Gastroenteritis- Viral vs Bacterial vs Parasitic (she is afebrile and as no nausea, vomiting, mucous or blood in the stool which may rule out at least bacterial causes of gastroenteritis. In addition, there were no other sick individuals).   - C. Difficle  (less likely due to no recent antibiotic use).  - IBS: Has had a possible history of IBS.   - possible compilation from cholecystectomy.   - side effect of metformin.    Plan:      1. Diarrhea, non-bloody: cause unknown.  -     Stool Investigations: culture, Occult blood,  Exam for Ova,Cysts,Parasites, WBC count, C. difficile antigen.  -     Blood tests:  CMP, CBC, Mg.   - Note: if stool studies return normal, then we may consider that her diarrhea is secondary to the anatomical changes from her cholecystectomy or as a side effect of the Metformin component of her Janumet. She may consider reducing the metformin component of her Janumet.           Patricia Silva  Medical Student Year 4  [employer].

## 2018-10-10 LAB
ALBUMIN SERPL-MCNC: 4.7 G/DL (ref 3.6–5.1)
ALBUMIN/GLOB SERPL: 1.9 (CALC) (ref 1–2.5)
ALP SERPL-CCNC: 68 U/L (ref 33–130)
ALT SERPL-CCNC: 21 U/L (ref 6–29)
AST SERPL-CCNC: 19 U/L (ref 10–35)
BASOPHILS # BLD AUTO: 100 CELLS/UL (ref 0–200)
BASOPHILS NFR BLD AUTO: 0.9 %
BILIRUB SERPL-MCNC: 0.4 MG/DL (ref 0.2–1.2)
BUN SERPL-MCNC: 19 MG/DL (ref 7–25)
BUN/CREAT SERPL: ABNORMAL (CALC) (ref 6–22)
CALCIUM SERPL-MCNC: 11.1 MG/DL (ref 8.6–10.4)
CHLORIDE SERPL-SCNC: 101 MMOL/L (ref 98–110)
CO2 SERPL-SCNC: 27 MMOL/L (ref 20–32)
CREAT SERPL-MCNC: 0.87 MG/DL (ref 0.5–0.99)
E COLI SXT1 STL QL IA: NEGATIVE
E COLI SXT2 STL QL IA: NEGATIVE
EOSINOPHIL # BLD AUTO: 167 CELLS/UL (ref 15–500)
EOSINOPHIL NFR BLD AUTO: 1.5 %
ERYTHROCYTE [DISTWIDTH] IN BLOOD BY AUTOMATED COUNT: 14.3 % (ref 11–15)
GFR SERPL CREATININE-BSD FRML MDRD: 69 ML/MIN/1.73M2
GLOBULIN SER CALC-MCNC: 2.5 G/DL (CALC) (ref 1.9–3.7)
GLUCOSE SERPL-MCNC: 126 MG/DL (ref 65–99)
HCT VFR BLD AUTO: 40.1 % (ref 35–45)
HGB BLD-MCNC: 12.9 G/DL (ref 11.7–15.5)
LYMPHOCYTES # BLD AUTO: 3463 CELLS/UL (ref 850–3900)
LYMPHOCYTES NFR BLD AUTO: 31.2 %
MAGNESIUM SERPL-MCNC: 1.9 MG/DL (ref 1.5–2.5)
MCH RBC QN AUTO: 26.2 PG (ref 27–33)
MCHC RBC AUTO-ENTMCNC: 32.2 G/DL (ref 32–36)
MCV RBC AUTO: 81.3 FL (ref 80–100)
MONOCYTES # BLD AUTO: 855 CELLS/UL (ref 200–950)
MONOCYTES NFR BLD AUTO: 7.7 %
NEUTROPHILS # BLD AUTO: 6516 CELLS/UL (ref 1500–7800)
NEUTROPHILS NFR BLD AUTO: 58.7 %
O+P STL TRI STN: NORMAL
PLATELET # BLD AUTO: 332 THOUSAND/UL (ref 140–400)
PMV BLD REES-ECKER: 11 FL (ref 7.5–12.5)
POTASSIUM SERPL-SCNC: 4.6 MMOL/L (ref 3.5–5.3)
PROT SERPL-MCNC: 7.2 G/DL (ref 6.1–8.1)
RBC # BLD AUTO: 4.93 MILLION/UL (ref 3.8–5.1)
SODIUM SERPL-SCNC: 141 MMOL/L (ref 135–146)
WBC # BLD AUTO: 11.1 THOUSAND/UL (ref 3.8–10.8)
WBC #/AREA STL HPF: NORMAL /[HPF]

## 2018-10-10 NOTE — PROGRESS NOTES
Subjective:       Patient ID: Katherine Rivers is a 68 y.o. female.    Chief Complaint: Diarrhea (Bad for about three weeks: Declined Flu for now)    HPI   The patient is a 68 year old female who is here today with diarrhea.  She has had diarrhea for the past 3 weeks.  So far this morning, she has had 4 episodes of diarrhea in a 30 min time period.   Over the past 3 weeks, when her diarrhea is really bad, she has recurrent bowel movements all day long and has had to stay home to be near the toilet.  Rarely over the past 3 weeks, she will have some days where she does not have any diarrhea.  She has not had any nighttime awakenings for diarrhea.  Sometimes her diarrhea is purely liquid and sometimes it has a soft consistency to it.  She has not had any normal bowel movements in the past 3 weeks.  She denies any blood or mucus in her stools.  She has had some bloating but no abdominal pain.  She denies any change in her appetite or her weight.  She has tried Manju-[place]on and Pepto-Bismol which has helped some.  She tries to avoid foods that she knows will trigger her diarrhea which include salads, coffee and oatmeal.  She is anxious to get her diarrhea better so that she can travel especially on an upcoming overseas trip that she has planned.  She denies any sick contacts.  She has not had any recent antibiotics.  Of note, she does have a history of IBS, had a cholecystectomy in 2016 and is on Janumet for her diabetes.    Of note, she tells me her acid reflux has improved dramatically which she is pleased about    Review of Systems   Constitutional: Negative for activity change, appetite change, chills, diaphoresis, fatigue, fever and unexpected weight change.   HENT: Positive for hearing loss. Negative for congestion, ear pain, postnasal drip, rhinorrhea, sinus pressure, sneezing, sore throat and trouble swallowing.    Eyes: Negative for pain, discharge and visual disturbance.   Respiratory: Negative for cough, chest  tightness, shortness of breath and wheezing.    Cardiovascular: Negative for chest pain, palpitations and leg swelling.   Gastrointestinal: Positive for diarrhea. Negative for abdominal distention, abdominal pain, blood in stool, constipation, nausea and vomiting.   Endocrine: Negative for polydipsia and polyuria.   Genitourinary: Negative for difficulty urinating, dysuria, hematuria and menstrual problem.   Musculoskeletal: Negative for arthralgias, joint swelling and neck pain.   Skin: Negative for rash.   Neurological: Negative for weakness and headaches.   Psychiatric/Behavioral: Negative for confusion and dysphoric mood.       Objective:      Physical Exam   Constitutional: She is oriented to person, place, and time. She appears well-developed and well-nourished. No distress.   HENT:   Head: Normocephalic and atraumatic.   Right Ear: Hearing, tympanic membrane, external ear and ear canal normal.   Left Ear: Hearing, tympanic membrane, external ear and ear canal normal.   Nose: Nose normal.   Mouth/Throat: Oropharynx is clear and moist and mucous membranes are normal. No oral lesions. No oropharyngeal exudate, posterior oropharyngeal edema or posterior oropharyngeal erythema.   Eyes: Conjunctivae, EOM and lids are normal. Pupils are equal, round, and reactive to light. No scleral icterus.   Neck: Normal range of motion. Neck supple. Carotid bruit is not present. No thyroid mass and no thyromegaly present.   Cardiovascular: Normal rate, regular rhythm and normal heart sounds.  No extrasystoles are present. PMI is not displaced. Exam reveals no gallop.   No murmur heard.  Pulmonary/Chest: Effort normal and breath sounds normal. No accessory muscle usage. No respiratory distress.   Clear to auscultation bilaterally.   Abdominal: Soft. Normal appearance. She exhibits no abdominal bruit. Bowel sounds are increased. There is no hepatosplenomegaly. There is no tenderness. There is no rebound.   Lymphadenopathy:         "Head (right side): No submental and no submandibular adenopathy present.        Head (left side): No submental and no submandibular adenopathy present.        Right cervical: No superficial cervical, no deep cervical and no posterior cervical adenopathy present.       Left cervical: No superficial cervical, no deep cervical and no posterior cervical adenopathy present.        Right: No supraclavicular adenopathy present.        Left: No supraclavicular adenopathy present.   Neurological: She is alert and oriented to person, place, and time.   Skin: Skin is warm, dry and intact.   Psychiatric: She has a normal mood and affect.     Blood pressure 118/66, pulse 80, temperature 98.9 °F (37.2 °C), temperature source Oral, resp. rate 18, height 5' 6" (1.676 m), weight 63.3 kg (139 lb 9.6 oz), last menstrual period 05/23/2012.Body mass index is 22.53 kg/m².        A/P:  1) diarrhea.  Acute but persistent.  We are going to do stool studies.  We are going to check labs.  We may consider a colonoscopy as her last colonoscopy was in 2016.  If her tests are unremarkable, we are going to consider decreasing the dose of her metformin or a trial of Questran to see if these changes would resolve her diarrhea.  If she develops any new or worsening symptoms as we proceed with her evaluation, she will let me know  "

## 2018-10-11 ENCOUNTER — PATIENT MESSAGE (OUTPATIENT)
Dept: FAMILY MEDICINE | Facility: CLINIC | Age: 68
End: 2018-10-11

## 2018-10-11 DIAGNOSIS — E83.52 HYPERCALCEMIA: Primary | ICD-10-CM

## 2018-10-11 DIAGNOSIS — R19.7 DIARRHEA, UNSPECIFIED TYPE: ICD-10-CM

## 2018-10-12 LAB — BACTERIA STL CULT: NORMAL

## 2018-10-16 RX ORDER — DIPHENOXYLATE HYDROCHLORIDE AND ATROPINE SULFATE 2.5; .025 MG/1; MG/1
1 TABLET ORAL 4 TIMES DAILY PRN
Qty: 40 TABLET | Refills: 0 | Status: SHIPPED | OUTPATIENT
Start: 2018-10-16 | End: 2018-10-26

## 2018-10-16 NOTE — TELEPHONE ENCOUNTER
Pt presented to clinic c/o diarrhea. States that she has already discussed the options with the provider however, the recommended treatment is a longer term plan than what she needs addressed currently. She is going on vacation next week and is asking for lomotil to take while on her trip. States that once she gets back, she will start the more long-term treatment plan. Order pended.

## 2018-10-17 ENCOUNTER — LAB VISIT (OUTPATIENT)
Dept: LAB | Facility: HOSPITAL | Age: 68
End: 2018-10-17
Attending: FAMILY MEDICINE
Payer: MEDICARE

## 2018-10-17 ENCOUNTER — TELEPHONE (OUTPATIENT)
Dept: FAMILY MEDICINE | Facility: CLINIC | Age: 68
End: 2018-10-17

## 2018-10-17 DIAGNOSIS — E83.52 HYPERCALCEMIA: ICD-10-CM

## 2018-10-17 LAB
CALCIUM SERPL-MCNC: 10.4 MG/DL
PTH-INTACT SERPL-MCNC: 35 PG/ML

## 2018-10-17 PROCEDURE — 83970 ASSAY OF PARATHORMONE: CPT

## 2018-10-17 PROCEDURE — 82310 ASSAY OF CALCIUM: CPT

## 2018-10-17 PROCEDURE — 36415 COLL VENOUS BLD VENIPUNCTURE: CPT | Mod: PO

## 2018-10-18 ENCOUNTER — PATIENT MESSAGE (OUTPATIENT)
Dept: FAMILY MEDICINE | Facility: CLINIC | Age: 68
End: 2018-10-18

## 2018-10-18 NOTE — ADDENDUM NOTE
Addended by: GATITO CARVALHO on: 10/18/2018 06:14 AM     Modules accepted: Orders    
prior major surgery

## 2018-11-02 ENCOUNTER — PATIENT MESSAGE (OUTPATIENT)
Dept: FAMILY MEDICINE | Facility: CLINIC | Age: 68
End: 2018-11-02

## 2018-11-02 DIAGNOSIS — R39.9 URINARY SYMPTOM OR SIGN: Primary | ICD-10-CM

## 2018-11-06 ENCOUNTER — LAB VISIT (OUTPATIENT)
Dept: LAB | Facility: HOSPITAL | Age: 68
End: 2018-11-06
Attending: FAMILY MEDICINE
Payer: MEDICARE

## 2018-11-06 ENCOUNTER — CLINICAL SUPPORT (OUTPATIENT)
Dept: FAMILY MEDICINE | Facility: CLINIC | Age: 68
End: 2018-11-06
Payer: MEDICARE

## 2018-11-06 ENCOUNTER — PATIENT MESSAGE (OUTPATIENT)
Dept: FAMILY MEDICINE | Facility: CLINIC | Age: 68
End: 2018-11-06

## 2018-11-06 DIAGNOSIS — R39.9 URINARY SYMPTOM OR SIGN: ICD-10-CM

## 2018-11-06 DIAGNOSIS — N39.0 URINARY TRACT INFECTION WITHOUT HEMATURIA, SITE UNSPECIFIED: Primary | ICD-10-CM

## 2018-11-06 LAB
BACTERIA #/AREA URNS HPF: ABNORMAL /HPF
BILIRUB SERPL-MCNC: ABNORMAL MG/DL
BILIRUB UR QL STRIP: NEGATIVE
BLOOD URINE, POC: ABNORMAL
CLARITY UR: ABNORMAL
COLOR UR: YELLOW
COLOR, POC UA: ABNORMAL
GLUCOSE UR QL STRIP: ABNORMAL
GLUCOSE UR QL STRIP: NEGATIVE
HGB UR QL STRIP: NEGATIVE
KETONES UR QL STRIP: ABNORMAL
KETONES UR QL STRIP: NEGATIVE
LEUKOCYTE ESTERASE UR QL STRIP: ABNORMAL
LEUKOCYTE ESTERASE URINE, POC: ABNORMAL
MICROSCOPIC COMMENT: ABNORMAL
NITRITE UR QL STRIP: POSITIVE
NITRITE, POC UA: ABNORMAL
PH UR STRIP: 7 [PH] (ref 5–8)
PH, POC UA: 8
PROT UR QL STRIP: NEGATIVE
PROTEIN, POC: ABNORMAL
SP GR UR STRIP: <=1.005 (ref 1–1.03)
SPECIFIC GRAVITY, POC UA: 1
URN SPEC COLLECT METH UR: ABNORMAL
UROBILINOGEN, POC UA: ABNORMAL
WBC #/AREA URNS HPF: 25 /HPF (ref 0–5)
WBC CLUMPS URNS QL MICRO: ABNORMAL

## 2018-11-06 PROCEDURE — 87088 URINE BACTERIA CULTURE: CPT

## 2018-11-06 PROCEDURE — 81002 URINALYSIS NONAUTO W/O SCOPE: CPT | Mod: S$GLB,,, | Performed by: FAMILY MEDICINE

## 2018-11-06 PROCEDURE — 81000 URINALYSIS NONAUTO W/SCOPE: CPT | Mod: PO

## 2018-11-06 PROCEDURE — 87077 CULTURE AEROBIC IDENTIFY: CPT

## 2018-11-06 PROCEDURE — 87086 URINE CULTURE/COLONY COUNT: CPT

## 2018-11-06 PROCEDURE — 87186 SC STD MICRODIL/AGAR DIL: CPT

## 2018-11-06 RX ORDER — SULFAMETHOXAZOLE AND TRIMETHOPRIM 800; 160 MG/1; MG/1
1 TABLET ORAL 2 TIMES DAILY
Qty: 6 TABLET | Refills: 0 | Status: SHIPPED | OUTPATIENT
Start: 2018-11-06 | End: 2018-11-09

## 2018-11-06 NOTE — PROGRESS NOTES
Pt presents to clinic c/o UTI symptoms. Provider ordered a urine culture to be performed. Provided pt with specimen cup and instructions on clean catch technique. Pt left urine sample. Cloudy and pale yellow in color. Collect urin into vial for culture and performed a POCT dipstick on the urine sample. Abnormal results observed, results forwarded along to provider for any additional recommendations.

## 2018-11-07 RX ORDER — ESOMEPRAZOLE MAGNESIUM 40 MG/1
40 CAPSULE, DELAYED RELEASE ORAL
Qty: 30 CAPSULE | Refills: 11 | OUTPATIENT
Start: 2018-11-07 | End: 2019-11-07

## 2018-11-07 NOTE — TELEPHONE ENCOUNTER
Patient states she takes zantac and does not need a refill of any kind at this time. (Please deny medication, unable to remove request.)

## 2018-11-09 ENCOUNTER — PATIENT MESSAGE (OUTPATIENT)
Dept: FAMILY MEDICINE | Facility: CLINIC | Age: 68
End: 2018-11-09

## 2018-11-09 LAB — BACTERIA UR CULT: NORMAL

## 2018-11-09 RX ORDER — CIPROFLOXACIN 250 MG/1
250 TABLET, FILM COATED ORAL 2 TIMES DAILY
Qty: 6 TABLET | Refills: 0 | Status: SHIPPED | OUTPATIENT
Start: 2018-11-09 | End: 2018-11-12

## 2018-11-28 ENCOUNTER — PATIENT OUTREACH (OUTPATIENT)
Dept: ADMINISTRATIVE | Facility: HOSPITAL | Age: 68
End: 2018-11-28

## 2018-11-28 DIAGNOSIS — Z78.0 POST-MENOPAUSAL: Primary | ICD-10-CM

## 2018-11-28 NOTE — PROGRESS NOTES
Health Maintenance Due   Topic Date Due    DEXA SCAN  07/28/2018    Influenza Vaccine  08/01/2018

## 2018-12-03 ENCOUNTER — PATIENT MESSAGE (OUTPATIENT)
Dept: ADMINISTRATIVE | Facility: HOSPITAL | Age: 68
End: 2018-12-03

## 2018-12-04 DIAGNOSIS — E11.9 DIABETES MELLITUS WITHOUT COMPLICATION: Primary | ICD-10-CM

## 2018-12-06 ENCOUNTER — HOSPITAL ENCOUNTER (OUTPATIENT)
Dept: RADIOLOGY | Facility: HOSPITAL | Age: 68
Discharge: HOME | End: 2018-12-06
Attending: FAMILY MEDICINE
Payer: MEDICARE

## 2018-12-06 ENCOUNTER — TELEPHONE (OUTPATIENT)
Dept: ADMINISTRATIVE | Facility: HOSPITAL | Age: 68
End: 2018-12-06

## 2018-12-06 DIAGNOSIS — E11.9 DIABETES MELLITUS WITHOUT COMPLICATION: Primary | ICD-10-CM

## 2018-12-06 DIAGNOSIS — Z78.0 POST-MENOPAUSAL: ICD-10-CM

## 2018-12-06 PROCEDURE — 77080 DXA BONE DENSITY AXIAL: CPT | Mod: TC,PO

## 2018-12-06 PROCEDURE — 77080 DXA BONE DENSITY AXIAL: CPT | Mod: 26,,, | Performed by: RADIOLOGY

## 2018-12-06 NOTE — TELEPHONE ENCOUNTER
Spoke to  because patient had already headed to clinic for labs and DEXA to confirm that patient is not going to use [facility] for the labs Dr. Casillas sent to them in June for her to have done.      He stated that he called [facility] and they will start using the [place] [facility].

## 2018-12-07 ENCOUNTER — PATIENT MESSAGE (OUTPATIENT)
Dept: FAMILY MEDICINE | Facility: CLINIC | Age: 68
End: 2018-12-07

## 2018-12-11 ENCOUNTER — OFFICE VISIT (OUTPATIENT)
Dept: FAMILY MEDICINE | Facility: CLINIC | Age: 68
End: 2018-12-11
Payer: MEDICARE

## 2018-12-11 VITALS
WEIGHT: 140.88 LBS | BODY MASS INDEX: 22.64 KG/M2 | TEMPERATURE: 99 F | OXYGEN SATURATION: 97 % | DIASTOLIC BLOOD PRESSURE: 78 MMHG | HEART RATE: 95 BPM | SYSTOLIC BLOOD PRESSURE: 114 MMHG | RESPIRATION RATE: 16 BRPM | HEIGHT: 66 IN

## 2018-12-11 DIAGNOSIS — E78.5 HYPERLIPIDEMIA ASSOCIATED WITH TYPE 2 DIABETES MELLITUS: ICD-10-CM

## 2018-12-11 DIAGNOSIS — E11.69 HYPERLIPIDEMIA ASSOCIATED WITH TYPE 2 DIABETES MELLITUS: ICD-10-CM

## 2018-12-11 DIAGNOSIS — K21.9 GASTROESOPHAGEAL REFLUX DISEASE, ESOPHAGITIS PRESENCE NOT SPECIFIED: ICD-10-CM

## 2018-12-11 DIAGNOSIS — E11.8 TYPE 2 DIABETES MELLITUS WITH COMPLICATION, WITHOUT LONG-TERM CURRENT USE OF INSULIN: Primary | ICD-10-CM

## 2018-12-11 DIAGNOSIS — J06.9 UPPER RESPIRATORY TRACT INFECTION, UNSPECIFIED TYPE: ICD-10-CM

## 2018-12-11 PROCEDURE — 1101F PT FALLS ASSESS-DOCD LE1/YR: CPT | Mod: CPTII,S$GLB,, | Performed by: FAMILY MEDICINE

## 2018-12-11 PROCEDURE — 99214 OFFICE O/P EST MOD 30 MIN: CPT | Mod: S$GLB,,, | Performed by: FAMILY MEDICINE

## 2018-12-11 PROCEDURE — 3078F DIAST BP <80 MM HG: CPT | Mod: CPTII,S$GLB,, | Performed by: FAMILY MEDICINE

## 2018-12-11 PROCEDURE — 3074F SYST BP LT 130 MM HG: CPT | Mod: CPTII,S$GLB,, | Performed by: FAMILY MEDICINE

## 2018-12-11 PROCEDURE — 3044F HG A1C LEVEL LT 7.0%: CPT | Mod: CPTII,S$GLB,, | Performed by: FAMILY MEDICINE

## 2018-12-11 RX ORDER — FLUTICASONE PROPIONATE 50 MCG
2 SPRAY, SUSPENSION (ML) NASAL DAILY
Qty: 16 G | Refills: 1 | Status: SHIPPED | OUTPATIENT
Start: 2018-12-11 | End: 2019-07-26

## 2018-12-11 RX ORDER — ATORVASTATIN CALCIUM 20 MG/1
20 TABLET, FILM COATED ORAL DAILY
Qty: 90 TABLET | Refills: 1 | Status: SHIPPED | OUTPATIENT
Start: 2018-12-11 | End: 2019-07-26 | Stop reason: SDUPTHER

## 2018-12-11 RX ORDER — ESTRADIOL 10 UG/1
10 INSERT VAGINAL
Qty: 24 TABLET | Refills: 1 | Status: SHIPPED | OUTPATIENT
Start: 2018-12-13 | End: 2019-02-17 | Stop reason: SDUPTHER

## 2018-12-14 ENCOUNTER — PATIENT MESSAGE (OUTPATIENT)
Dept: FAMILY MEDICINE | Facility: CLINIC | Age: 68
End: 2018-12-14

## 2018-12-14 NOTE — PROGRESS NOTES
Subjective:       Patient ID: Katherine Rivers is a 68 y.o. female.    Chief Complaint: Follow-up; Sinus Problem; and medication review (change nexium to ranitidine)    HPI   The patient is a 68-year-old who is here today for her six-month follow-up.  Overall, she is doing well although she does have a bit of a cold.    Today we discussed the followin)   Diabetes.  Her recent A1c was 6.5.  Her fasting Chemstrips have been less than 120.  She is currently taking Janumet  mg half a tablet twice a day.  (She prefers splitting her Janumet instead of taking it just once a day.)  Since decreasing the Janumet, she has had no more diarrhea and she feels that the prior dose of Glucophage was too strong for her  2) GERD.  If she would like to change from Nexium to Zantac.  Since changing the Janumet dose, her stomach is the best it has been in years.  She has not had any reflux symptoms in 6 weeks.  3) osteopenia.  We did review her recent DEXA scan results   4) cold.  She had a cold for the past couple of days.  She is feeling fine except for the nasal congestion, rhinorrhea and ear congestion.  She denies any fevers or chills      Review of Systems   Constitutional: Negative for appetite change, chills, diaphoresis, fatigue, fever and unexpected weight change.   HENT: Positive for congestion, ear pain, hearing loss, postnasal drip and rhinorrhea. Negative for sinus pressure, sneezing, sore throat and trouble swallowing.    Eyes: Negative for pain, discharge and visual disturbance.   Respiratory: Positive for cough. Negative for chest tightness, shortness of breath and wheezing.    Cardiovascular: Negative for chest pain, palpitations and leg swelling.   Gastrointestinal: Negative for abdominal distention, abdominal pain, blood in stool, constipation, diarrhea, nausea and vomiting.   Endocrine: Positive for polyuria. Negative for polydipsia and polyphagia.   Skin: Negative for pallor and rash.   Neurological:  Negative for dizziness, tremors, seizures, speech difficulty, weakness and headaches.   Psychiatric/Behavioral: Negative for confusion. The patient is not nervous/anxious.        Objective:      Physical Exam   Constitutional: She is oriented to person, place, and time. She appears well-developed and well-nourished. No distress.   HENT:   Head: Normocephalic and atraumatic.   Right Ear: Tympanic membrane, external ear and ear canal normal. Decreased hearing is noted.   Left Ear: Tympanic membrane, external ear and ear canal normal. Decreased hearing is noted.   Nose: Nose normal.   Mouth/Throat: Oropharynx is clear and moist and mucous membranes are normal. No oral lesions. No oropharyngeal exudate, posterior oropharyngeal edema or posterior oropharyngeal erythema.   B hearing aides.   Eyes: Conjunctivae, EOM and lids are normal. Pupils are equal, round, and reactive to light. No scleral icterus.   Neck: Normal range of motion. Neck supple. Carotid bruit is not present. No thyroid mass and no thyromegaly present.   Cardiovascular: Normal rate, regular rhythm and normal heart sounds.  No extrasystoles are present. PMI is not displaced. Exam reveals no gallop.   No murmur heard.  Pulmonary/Chest: Effort normal and breath sounds normal. No accessory muscle usage. No respiratory distress.   Clear to auscultation bilaterally.   Abdominal: Soft. Normal appearance and bowel sounds are normal. She exhibits no abdominal bruit. There is no hepatosplenomegaly. There is no tenderness. There is no rebound.   Lymphadenopathy:        Head (right side): No submental and no submandibular adenopathy present.        Head (left side): No submental and no submandibular adenopathy present.        Right cervical: No superficial cervical, no deep cervical and no posterior cervical adenopathy present.       Left cervical: No superficial cervical, no deep cervical and no posterior cervical adenopathy present.        Right: No supraclavicular  "adenopathy present.        Left: No supraclavicular adenopathy present.   Neurological: She is alert and oriented to person, place, and time.   Skin: Skin is warm, dry and intact.   Psychiatric: She has a normal mood and affect.     Blood pressure 114/78, pulse 95, temperature 98.6 °F (37 °C), temperature source Oral, resp. rate 16, height 5' 6" (1.676 m), weight 63.9 kg (140 lb 14 oz), last menstrual period 05/23/2012, SpO2 97 %.Body mass index is 22.74 kg/m².          A/P:  1) diabetes.  Well controlled.  Continue with Janumet.  If her fasting sugars are consistently higher than 140, she will let me know.  We will check an A1c in 6 months  2) diarrhea.  Resolved after adjusting Janumet dose.   If she has recurrent symptoms, she will let me know  3)  GERD.  Well controlled.  We will stop the Nexium and try Zantac 300 mg at night.  If she has recurrent symptoms, she will let me know      4) osteopenia.  Stable.  We will recheck a DEXA scan in 3 years   5) URI.    Acute.  Continue with symptomatic/supportive care.  If she is not doing better or if she worsens, she will let me know  6) hyperlipidemia.  She will continue with the Lipitor.  We will check fasting labs prior to her next visit      I will see her back in 3 months or sooner if needed  "

## 2019-02-18 RX ORDER — ESTRADIOL 10 UG/1
TABLET VAGINAL
Qty: 26 TABLET | Refills: 3 | Status: SHIPPED | OUTPATIENT
Start: 2019-02-18 | End: 2019-07-26 | Stop reason: SDUPTHER

## 2019-02-19 ENCOUNTER — PATIENT MESSAGE (OUTPATIENT)
Dept: OBSTETRICS AND GYNECOLOGY | Facility: CLINIC | Age: 69
End: 2019-02-19

## 2019-02-20 ENCOUNTER — TELEPHONE (OUTPATIENT)
Dept: OBSTETRICS AND GYNECOLOGY | Facility: CLINIC | Age: 69
End: 2019-02-20

## 2019-02-20 ENCOUNTER — CLINICAL SUPPORT (OUTPATIENT)
Dept: OBSTETRICS AND GYNECOLOGY | Facility: CLINIC | Age: 69
End: 2019-02-20
Payer: MEDICARE

## 2019-02-20 DIAGNOSIS — R35.0 FREQUENCY OF URINATION: Primary | ICD-10-CM

## 2019-02-20 DIAGNOSIS — R30.0 DYSURIA: Primary | ICD-10-CM

## 2019-02-20 LAB
BILIRUB SERPL-MCNC: NORMAL MG/DL
BLOOD URINE, POC: NORMAL
COLOR, POC UA: NORMAL
GLUCOSE UR QL STRIP: NORMAL
KETONES UR QL STRIP: NORMAL
LEUKOCYTE ESTERASE URINE, POC: NORMAL
NITRITE, POC UA: NORMAL
PH, POC UA: 6
PROTEIN, POC: NORMAL
SPECIFIC GRAVITY, POC UA: NORMAL
UROBILINOGEN, POC UA: NORMAL

## 2019-02-20 PROCEDURE — 87086 URINE CULTURE/COLONY COUNT: CPT

## 2019-02-20 PROCEDURE — 81002 POCT URINE DIPSTICK WITHOUT MICROSCOPE: ICD-10-PCS | Mod: S$GLB,,, | Performed by: OBSTETRICS & GYNECOLOGY

## 2019-02-20 PROCEDURE — 81002 URINALYSIS NONAUTO W/O SCOPE: CPT | Mod: S$GLB,,, | Performed by: OBSTETRICS & GYNECOLOGY

## 2019-02-20 RX ORDER — PHENAZOPYRIDINE HYDROCHLORIDE 200 MG/1
200 TABLET, FILM COATED ORAL 3 TIMES DAILY PRN
Qty: 12 TABLET | Refills: 0 | Status: SHIPPED | OUTPATIENT
Start: 2019-02-20 | End: 2019-03-02

## 2019-02-20 NOTE — PROGRESS NOTES
patient with complaint of needing to urinate every hour, dark urine , no discomfort. Urine dipped and sent for culture , message to Dr. Plascencia.

## 2019-02-20 NOTE — TELEPHONE ENCOUNTER
Came in for appointment and not able to be seen, complaining of needing to void every hour. S/p bladder repair x 3 . Urine dip negative , culture sent and appointment scheduled for next week.   Do you want to send in medication for sx relief till appointment ?     Notified rx called in, not covered under her insurance will continue with Azo for now.

## 2019-02-22 LAB
BACTERIA UR CULT: NORMAL
BACTERIA UR CULT: NORMAL

## 2019-02-26 ENCOUNTER — OFFICE VISIT (OUTPATIENT)
Dept: OBSTETRICS AND GYNECOLOGY | Facility: CLINIC | Age: 69
End: 2019-02-26
Payer: MEDICARE

## 2019-02-26 VITALS
HEIGHT: 66 IN | SYSTOLIC BLOOD PRESSURE: 126 MMHG | WEIGHT: 143.5 LBS | DIASTOLIC BLOOD PRESSURE: 86 MMHG | BODY MASS INDEX: 23.06 KG/M2

## 2019-02-26 DIAGNOSIS — R39.9 UTI SYMPTOMS: Primary | ICD-10-CM

## 2019-02-26 PROCEDURE — G0101 PR CA SCREEN;PELVIC/BREAST EXAM: ICD-10-PCS | Mod: S$GLB,,, | Performed by: OBSTETRICS & GYNECOLOGY

## 2019-02-26 PROCEDURE — 99999 PR PBB SHADOW E&M-EST. PATIENT-LVL IV: ICD-10-PCS | Mod: PBBFAC,,, | Performed by: OBSTETRICS & GYNECOLOGY

## 2019-02-26 PROCEDURE — G0101 CA SCREEN;PELVIC/BREAST EXAM: HCPCS | Mod: S$GLB,,, | Performed by: OBSTETRICS & GYNECOLOGY

## 2019-02-26 PROCEDURE — 99999 PR PBB SHADOW E&M-EST. PATIENT-LVL IV: CPT | Mod: PBBFAC,,, | Performed by: OBSTETRICS & GYNECOLOGY

## 2019-02-26 NOTE — PROGRESS NOTES
Chief Complaint   Patient presents with    urine issues    possible UTI    Well Woman       History and Physical:  Patient's last menstrual period was 05/23/2012.       Katherine Rivers is a 68 y.o.  female who presents today for her routine annual GYN exam. The patient has no Gynecology complaints today. Frequent urination , large volumes, counseled on normal hydration.       Allergies:   Review of patient's allergies indicates:   Allergen Reactions    No known drug allergies        Past Medical History:   Diagnosis Date    Bilateral hearing loss     wears hearing aides    Cystocele     Diabetes mellitus     dx 57    Diverticular disease     General anesthetics causing adverse effect in therapeutic use     faints after surgery    GERD (gastroesophageal reflux disease)     H/O esophagogastroduodenoscopy     normal 3/16    History of colonoscopy     3/16; next due 3/21    Hyperlipidemia LDL goal < 100     Irritable bowel syndrome     Osteopenia     Dexa 4/11 and 7/15 adn 11/18       Past Surgical History:   Procedure Laterality Date    BELT ABDOMINOPLASTY      BILATERAL SALPINGOOPHORECTOMY      BLADDER SUSPENSION  2/23/12     x 3    CERVICAL POLYPECTOMY      CHOLECYSTECTOMY      CHOLECYSTECTOMY-LAPAROSCOPIC N/A 5/11/2016    Per[facility] at [facility] OR    COLONOSCOPY  1/2006    by Dr. lauren barcenas (report in media section) diverticulosis, otherwise normal findings, repeat in 10 years for screening    COLONOSCOPY N/A 3/8/2016    Performed by Erickson Uribe Jr., MD at [facility] ENDO    COLPORRHAPHY      CYSTOSCOPY N/A 5/20/2014    Performed by Jeremias Plascencia [facility] at [facility] OR    CYSTOSCOPY N/A 5/2/2012    Performed by Iveth Payne MD at [facility] OR    dental implant      ESOPHAGOGASTRODUODENOSCOPY (EGD) N/A 3/8/2016    Performed by Erickson rUibe Jr., MD at [facility] ENDO    [facility]CTOMY  2011    [facility]    HYSTERECTOMY, TOTAL, LAPAROSCOPIC N/A 5/2/2012    Performed  by [facility] at [facility] OR    OOPHORECTOMY  2011    bilat    REPAIR-ANTERIOR (COLPORRHAPHY) N/A 5/20/2014    Performed [facility]encia [facility] at [facility] OR    REPAIR-ANTERIOR-POSTERIOR N/A 5/20/2014    Performed by [facility] at [facility] OR    SALPINGO-OOPHORECTOMY, LAPAROSCOPIC Bilateral 5/2/2012    Performed by Iveth Payne MD at [facility] OR    TUBAL LIGATION      VAGINAL DELIVERY      times 2    VAGINAL HYSTERECTOMY W/ ANTERIOR AND POSTERIOR VAGINAL REPAIR  05/20/14       MEDS:   Current Outpatient Medications on File Prior to Visit   Medication Sig Dispense Refill    atorvastatin (LIPITOR) 20 MG tablet Take 1 tablet (20 mg total) by mouth once daily. 90 tablet 1    blood sugar diagnostic (ONETOUCH ULTRA TEST) Strp Check once daily 150 strip 3    blood-glucose meter kit 1 each by Other route once daily. Use as instructed      fluticasone (FLONASE) 50 mcg/actuation nasal spray 2 sprays (100 mcg total) by Each Nare route once daily. 16 g 1    L. ACIDOPHILUS/BIFID. ANIMALIS (ONE-A-DAY TRUBIOTICS ORAL) Take 1 tablet by mouth once daily.      lancets (ONETOUCH ULTRASOFT LANCETS) Misc Pt to check blood sugar 2x daily. 100 each 5    MULTIVITAMIN WITH MINERALS (HAIR,SKIN & NAILS ORAL) Take 1 tablet by mouth 2 (two) times daily.        phenazopyridine (PYRIDIUM) 200 MG tablet Take 1 tablet (200 mg total) by mouth 3 (three) times daily as needed for Pain. 12 tablet 0    ranitidine (ZANTAC) 300 MG tablet Take 1 tablet (300 mg total) by mouth every evening. 90 tablet 1    SITagliptan-metformin (JANUMET) 50-1,000 mg per tablet TAKE 1 TABLET BY MOUTH 2 (TWO) TIMES DAILY WITH A MEAL. 180 tablet 1    tretinoin (RETIN-A) 0.025 % cream nightly.       YUVAFEM 10 mcg Tab INSERT 1 TABLET VAGINALLY TWICE PER WEEK 26 tablet 3    aspirin (ECOTRIN) 81 MG EC tablet Take 81 mg by mouth once daily.      [DISCONTINUED] calcium citrate/vitamin D3 (CITRACAL + D ORAL) Take by mouth once daily.       [DISCONTINUED] cholecalciferol, vitamin D3, (VITAMIN D3) 2,000 unit Cap Take 1 capsule by mouth once daily.       No current facility-administered medications on file prior to visit.        OB History      Para Term  AB Living    3 2 2   1 2    SAB TAB Ectopic Multiple Live Births    1       2          Social History     Socioeconomic History    Marital status:      Spouse name: Not on file    Number of children: Not on file    Years of education: Not on file    Highest education level: Not on file   Social Needs    Financial resource strain: Not on file    Food insecurity - worry: Not on file    Food insecurity - inability: Not on file    Transportation needs - medical: Not on file    Transportation needs - non-medical: Not on file   Occupational History    Not on file   Tobacco Use    Smoking status: Never Smoker    Smokeless tobacco: Never Used   Substance and Sexual Activity    Alcohol use: Yes     Alcohol/week: 0.5 oz     Types: 1 Standard drinks or equivalent per week     Frequency: Never     Comment: rarely     Drug use: No    Sexual activity: Yes     Partners: Male     Birth control/protection: Post-menopausal   Other Topics Concern    Not on file   Social History Narrative    Not on file       Family History   Problem Relation Age of Onset    Hyperlipidemia Mother     Diabetes Father         Type 1    Cancer Father         lung + tob    Cancer Maternal Aunt         breast cancer    Breast cancer Maternal Aunt     Ovarian cancer Neg Hx          Past medical and surgical history reviewed.   I have reviewed the patient's medical history in detail and updated the computerized patient record.        Review of System:   General: no chills, fever, night sweats, weight gain or weight loss  Psychological: no depression or suicidal ideation  Breasts: no new or changing breast lumps, nipple discharge or masses.  Respiratory: no cough, shortness of breath, or  "wheezing  Cardiovascular: no chest pain or dyspnea on exertion  Gastrointestinal: no abdominal pain, change in bowel habits, or black or bloody stools  Genito-Urinary: no incontinence or vulvar/vaginal symptoms, pelvic pain or abnormal vaginal bleeding.  Musculoskeletal: no gait disturbance or muscular weakness      Physical Exam:   /86   Ht 5' 6" (1.676 m)   Wt 65.1 kg (143 lb 8.3 oz)   LMP 05/23/2012   BMI 23.16 kg/m²   Constitutional: She is oriented to person, place, and time. She appears well-developed and well-nourished. No distress.   HENT:   Head: Normocephalic and atraumatic.   Eyes: Conjunctivae and EOM are normal. No scleral icterus.   Neck: Normal range of motion. Neck supple. No tracheal deviation present.   Cardiovascular: Normal rate.    Pulmonary/Chest: Effort normal. No respiratory distress. She exhibits no tenderness.  Breasts: are symmetrical.   Right breast exhibits no inverted nipple, no mass, no nipple discharge, no skin change and no tenderness.   Left breast exhibits no inverted nipple, no mass, no nipple discharge, no skin change and no tenderness.  Abdominal: Soft. She exhibits no distension and no mass. There is no tenderness. There is no rebound and no guarding.   Genitourinary:    External rectal exam shows no thrombosed external hemorrhoids.    Pelvic exam was performed with patient supine.   No labial fusion.   There is no rash, lesion or injury on the right labia.   There is no rash, lesion or injury on the left labia.   No bleeding and no signs of injury around the vaginal introitus, urethra is without lesions and well supported.    No vaginal discharge found. Atriophic, grade I cystocele   No significant Enterocele or rectocele, and cuff well supported.   Bimanual exam:   The urethra and vagina are without palpable masses or tenderness.   Uterus and cervix are surgically absents, vaginal cuff is intact and well supported.   Right adnexum displays no mass and no " tenderness.   Left adnexum displays no mass and no tenderness.  Musculoskeletal: Normal range of motion.   Lymphadenopathy: No inguinal adenopathy present.   Neurological: She is alert and oriented to person, place, and time. Coordination normal.   Skin: Skin is warm and dry. She is not diaphoretic.   Psychiatric: She has a normal mood and affect.        Assessment:   Normal annual GYN exam  1. UTI symptoms  POCT URINE DIPSTICK WITHOUT MICROSCOPE    Urine culture   overhydration with  Frequency- negative urine culture    Plan:   PAP Not Needed  Mammogram when due  mirbetriq as needed 50mg  Follow up in 2 years or as needed .

## 2019-03-28 ENCOUNTER — PATIENT MESSAGE (OUTPATIENT)
Dept: FAMILY MEDICINE | Facility: CLINIC | Age: 69
End: 2019-03-28

## 2019-03-29 RX ORDER — OMEPRAZOLE 40 MG/1
40 CAPSULE, DELAYED RELEASE ORAL DAILY
Qty: 90 CAPSULE | Refills: 3 | Status: SHIPPED | OUTPATIENT
Start: 2019-03-29 | End: 2019-07-26 | Stop reason: SDUPTHER

## 2019-05-04 ENCOUNTER — PATIENT MESSAGE (OUTPATIENT)
Dept: FAMILY MEDICINE | Facility: CLINIC | Age: 69
End: 2019-05-04

## 2019-06-11 ENCOUNTER — LAB VISIT (OUTPATIENT)
Dept: LAB | Facility: HOSPITAL | Age: 69
End: 2019-06-11
Attending: FAMILY MEDICINE
Payer: MEDICARE

## 2019-06-11 DIAGNOSIS — E78.5 HYPERLIPIDEMIA ASSOCIATED WITH TYPE 2 DIABETES MELLITUS: ICD-10-CM

## 2019-06-11 DIAGNOSIS — E11.69 HYPERLIPIDEMIA ASSOCIATED WITH TYPE 2 DIABETES MELLITUS: ICD-10-CM

## 2019-06-11 DIAGNOSIS — E11.8 TYPE 2 DIABETES MELLITUS WITH COMPLICATION, WITHOUT LONG-TERM CURRENT USE OF INSULIN: ICD-10-CM

## 2019-06-11 LAB
ALBUMIN SERPL BCP-MCNC: 4.1 G/DL (ref 3.5–5.2)
ALP SERPL-CCNC: 72 U/L (ref 55–135)
ALT SERPL W/O P-5'-P-CCNC: 30 U/L (ref 10–44)
ANION GAP SERPL CALC-SCNC: 11 MMOL/L (ref 8–16)
AST SERPL-CCNC: 31 U/L (ref 10–40)
BASOPHILS # BLD AUTO: 0.12 K/UL (ref 0–0.2)
BASOPHILS NFR BLD: 1.2 % (ref 0–1.9)
BILIRUB SERPL-MCNC: 0.6 MG/DL (ref 0.1–1)
BUN SERPL-MCNC: 15 MG/DL (ref 8–23)
CALCIUM SERPL-MCNC: 10.3 MG/DL (ref 8.7–10.5)
CHLORIDE SERPL-SCNC: 101 MMOL/L (ref 95–110)
CHOLEST SERPL-MCNC: 165 MG/DL (ref 120–199)
CHOLEST/HDLC SERPL: 3.1 {RATIO} (ref 2–5)
CO2 SERPL-SCNC: 26 MMOL/L (ref 23–29)
CREAT SERPL-MCNC: 0.9 MG/DL (ref 0.5–1.4)
DIFFERENTIAL METHOD: ABNORMAL
EOSINOPHIL # BLD AUTO: 0.4 K/UL (ref 0–0.5)
EOSINOPHIL NFR BLD: 4.1 % (ref 0–8)
ERYTHROCYTE [DISTWIDTH] IN BLOOD BY AUTOMATED COUNT: 15.4 % (ref 11.5–14.5)
EST. GFR  (AFRICAN AMERICAN): >60 ML/MIN/1.73 M^2
EST. GFR  (NON AFRICAN AMERICAN): >60 ML/MIN/1.73 M^2
ESTIMATED AVG GLUCOSE: 148 MG/DL (ref 68–131)
GLUCOSE SERPL-MCNC: 135 MG/DL (ref 70–110)
HBA1C MFR BLD HPLC: 6.8 % (ref 4–5.6)
HCT VFR BLD AUTO: 41.3 % (ref 37–48.5)
HDLC SERPL-MCNC: 54 MG/DL (ref 40–75)
HDLC SERPL: 32.7 % (ref 20–50)
HGB BLD-MCNC: 12.9 G/DL (ref 12–16)
IMM GRANULOCYTES # BLD AUTO: 0.05 K/UL (ref 0–0.04)
IMM GRANULOCYTES NFR BLD AUTO: 0.5 % (ref 0–0.5)
LDLC SERPL CALC-MCNC: 70.6 MG/DL (ref 63–159)
LYMPHOCYTES # BLD AUTO: 3.4 K/UL (ref 1–4.8)
LYMPHOCYTES NFR BLD: 34.9 % (ref 18–48)
MCH RBC QN AUTO: 26.5 PG (ref 27–31)
MCHC RBC AUTO-ENTMCNC: 31.2 G/DL (ref 32–36)
MCV RBC AUTO: 85 FL (ref 82–98)
MONOCYTES # BLD AUTO: 0.8 K/UL (ref 0.3–1)
MONOCYTES NFR BLD: 8.4 % (ref 4–15)
NEUTROPHILS # BLD AUTO: 4.9 K/UL (ref 1.8–7.7)
NEUTROPHILS NFR BLD: 50.9 % (ref 38–73)
NONHDLC SERPL-MCNC: 111 MG/DL
NRBC BLD-RTO: 0 /100 WBC
PLATELET # BLD AUTO: 288 K/UL (ref 150–350)
PMV BLD AUTO: 11.2 FL (ref 9.2–12.9)
POTASSIUM SERPL-SCNC: 3.9 MMOL/L (ref 3.5–5.1)
PROT SERPL-MCNC: 7.3 G/DL (ref 6–8.4)
RBC # BLD AUTO: 4.87 M/UL (ref 4–5.4)
SODIUM SERPL-SCNC: 138 MMOL/L (ref 136–145)
TRIGL SERPL-MCNC: 202 MG/DL (ref 30–150)
TSH SERPL DL<=0.005 MIU/L-ACNC: 1.24 UIU/ML (ref 0.4–4)
WBC # BLD AUTO: 9.72 K/UL (ref 3.9–12.7)

## 2019-06-11 PROCEDURE — 83036 HEMOGLOBIN GLYCOSYLATED A1C: CPT

## 2019-06-11 PROCEDURE — 84443 ASSAY THYROID STIM HORMONE: CPT

## 2019-06-11 PROCEDURE — 36415 COLL VENOUS BLD VENIPUNCTURE: CPT | Mod: PO

## 2019-06-11 PROCEDURE — 85025 COMPLETE CBC W/AUTO DIFF WBC: CPT

## 2019-06-11 PROCEDURE — 80053 COMPREHEN METABOLIC PANEL: CPT

## 2019-06-11 PROCEDURE — 80061 LIPID PANEL: CPT

## 2019-06-12 ENCOUNTER — PATIENT MESSAGE (OUTPATIENT)
Dept: FAMILY MEDICINE | Facility: CLINIC | Age: 69
End: 2019-06-12

## 2019-07-12 ENCOUNTER — PATIENT OUTREACH (OUTPATIENT)
Dept: ADMINISTRATIVE | Facility: HOSPITAL | Age: 69
End: 2019-07-12

## 2019-07-12 NOTE — PROGRESS NOTES
Health Maintenance Due   Topic Date Due    Shingles Vaccine (2 of 3) 08/01/2012    Eye Exam  06/05/2019    Pneumococcal Vaccine (65+ Low/Medium Risk) (2 of 2 - PCV13) 06/12/2019    Foot Exam  06/12/2019    Mammogram  08/21/2019     Chart review completed 07/12/2019  Future Appointments   Date Time Provider Department Center   7/26/2019  9:50 AM Jennifer Casillas MD [facility]     Hemoglobin A1C   Date Value Ref Range Status   06/11/2019 6.8 (H) 4.0 - 5.6 % Final     Comment:     ADA Screening Guidelines:  5.7-6.4%  Consistent with prediabetes  >or=6.5%  Consistent with diabetes  High levels of fetal hemoglobin interfere with the HbA1C  assay. Heterozygous hemoglobin variants (HbS, HgC, etc)do  not significantly interfere with this assay.   However, presence of multiple variants may affect accuracy.     12/06/2018 6.5 (H) 4.0 - 5.6 % Final     Comment:     ADA Screening Guidelines:  5.7-6.4%  Consistent with prediabetes  >or=6.5%  Consistent with diabetes  High levels of fetal hemoglobin interfere with the HbA1C  assay. Heterozygous hemoglobin variants (HbS, HgC, etc)do  not significantly interfere with this assay.   However, presence of multiple variants may affect accuracy.     09/12/2016 6.5 (H) 4.5 - 6.2 % Final     Comment:     According to ADA guidelines, hemoglobin A1C <7.0% represents  optimal control in non-pregnant diabetic patients.  Different  metrics may apply to specific populations.   Standards of Medical Care in Diabetes - 2016.  For the purpose of screening for the presence of diabetes:  <5.7%     Consistent with the absence of diabetes  5.7-6.4%  Consistent with increasing risk for diabetes   (prediabetes)  >or=6.5%  Consistent with diabetes  Currently no consensus exists for use of hemoglobin A1C  for diagnosis of diabetes for children.

## 2019-07-26 ENCOUNTER — OFFICE VISIT (OUTPATIENT)
Dept: FAMILY MEDICINE | Facility: CLINIC | Age: 69
End: 2019-07-26
Payer: MEDICARE

## 2019-07-26 VITALS
WEIGHT: 143.63 LBS | OXYGEN SATURATION: 98 % | HEART RATE: 78 BPM | SYSTOLIC BLOOD PRESSURE: 128 MMHG | BODY MASS INDEX: 23.08 KG/M2 | RESPIRATION RATE: 16 BRPM | DIASTOLIC BLOOD PRESSURE: 80 MMHG | TEMPERATURE: 98 F | HEIGHT: 66 IN

## 2019-07-26 DIAGNOSIS — Z12.39 BREAST CANCER SCREENING: ICD-10-CM

## 2019-07-26 DIAGNOSIS — D22.9 ATYPICAL NEVI: ICD-10-CM

## 2019-07-26 DIAGNOSIS — Z12.39 SCREENING FOR BREAST CANCER: ICD-10-CM

## 2019-07-26 DIAGNOSIS — E11.8 TYPE 2 DIABETES MELLITUS WITH COMPLICATION, WITHOUT LONG-TERM CURRENT USE OF INSULIN: Primary | ICD-10-CM

## 2019-07-26 DIAGNOSIS — L84 CORN: ICD-10-CM

## 2019-07-26 PROCEDURE — 99499 UNLISTED E&M SERVICE: CPT | Mod: S$GLB,,, | Performed by: FAMILY MEDICINE

## 2019-07-26 PROCEDURE — 3044F HG A1C LEVEL LT 7.0%: CPT | Mod: CPTII,S$GLB,, | Performed by: FAMILY MEDICINE

## 2019-07-26 PROCEDURE — 1101F PR PT FALLS ASSESS DOC 0-1 FALLS W/OUT INJ PAST YR: ICD-10-PCS | Mod: CPTII,S$GLB,, | Performed by: FAMILY MEDICINE

## 2019-07-26 PROCEDURE — 3079F DIAST BP 80-89 MM HG: CPT | Mod: CPTII,S$GLB,, | Performed by: FAMILY MEDICINE

## 2019-07-26 PROCEDURE — 99214 PR OFFICE/OUTPT VISIT, EST, LEVL IV, 30-39 MIN: ICD-10-PCS | Mod: S$GLB,,, | Performed by: FAMILY MEDICINE

## 2019-07-26 PROCEDURE — 3074F SYST BP LT 130 MM HG: CPT | Mod: CPTII,S$GLB,, | Performed by: FAMILY MEDICINE

## 2019-07-26 PROCEDURE — 3044F PR MOST RECENT HEMOGLOBIN A1C LEVEL <7.0%: ICD-10-PCS | Mod: CPTII,S$GLB,, | Performed by: FAMILY MEDICINE

## 2019-07-26 PROCEDURE — 3074F PR MOST RECENT SYSTOLIC BLOOD PRESSURE < 130 MM HG: ICD-10-PCS | Mod: CPTII,S$GLB,, | Performed by: FAMILY MEDICINE

## 2019-07-26 PROCEDURE — 1101F PT FALLS ASSESS-DOCD LE1/YR: CPT | Mod: CPTII,S$GLB,, | Performed by: FAMILY MEDICINE

## 2019-07-26 PROCEDURE — 3079F PR MOST RECENT DIASTOLIC BLOOD PRESSURE 80-89 MM HG: ICD-10-PCS | Mod: CPTII,S$GLB,, | Performed by: FAMILY MEDICINE

## 2019-07-26 PROCEDURE — 99499 RISK ADDL DX/OHS AUDIT: ICD-10-PCS | Mod: S$GLB,,, | Performed by: FAMILY MEDICINE

## 2019-07-26 PROCEDURE — 99214 OFFICE O/P EST MOD 30 MIN: CPT | Mod: S$GLB,,, | Performed by: FAMILY MEDICINE

## 2019-07-26 RX ORDER — OMEPRAZOLE 40 MG/1
40 CAPSULE, DELAYED RELEASE ORAL DAILY
Qty: 90 CAPSULE | Refills: 3 | Status: SHIPPED | OUTPATIENT
Start: 2019-07-26 | End: 2020-06-15

## 2019-07-26 RX ORDER — ATORVASTATIN CALCIUM 20 MG/1
20 TABLET, FILM COATED ORAL DAILY
Qty: 90 TABLET | Refills: 3 | Status: SHIPPED | OUTPATIENT
Start: 2019-07-26 | End: 2020-06-15

## 2019-07-26 RX ORDER — ESTRADIOL 10 UG/1
INSERT VAGINAL
Qty: 26 TABLET | Refills: 3 | Status: SHIPPED | OUTPATIENT
Start: 2019-07-26 | End: 2020-08-20

## 2019-07-26 RX ORDER — ATORVASTATIN CALCIUM 20 MG/1
TABLET, FILM COATED ORAL
Qty: 90 TABLET | Refills: 1 | OUTPATIENT
Start: 2019-07-26

## 2019-07-28 NOTE — PROGRESS NOTES
Subjective:       Patient ID: Katherine Rivers is a 69 y.o. female.    Chief Complaint: Follow-up    HPI   The patient is a 59-year-old who is here today for chronic follow-up.  Overall, she is doing well.  She and her  have been doing some recent traveling including a recent cruise.  She has no acute questions or concerns regarding her health.  Today we discussed the followin)   Diabetes.  Her recent A1c was 6.8.  She is currently taking the Janumet  mg half a tablet twice a day.  She has not recently checked her sugars and has not felt as if her sugars have been high or low.  She would like to see the podiatrist for her diabetic foot exam and for a corn that  bothers her   2)   Hyperlipidemia.  She is doing with her Lipitor.  Her recent total cholesterol was 116 and her LDL was 70 with a triglyceride level of 202.    She does request a referral to the dermatologist as she has a couple of skin spots she is concerned about    Review of Systems   Constitutional: Negative for appetite change, chills, diaphoresis, fatigue, fever and unexpected weight change.   HENT: Negative for congestion, ear pain, postnasal drip, rhinorrhea, sinus pressure, sneezing, sore throat and trouble swallowing.    Eyes: Negative for pain, discharge and visual disturbance.   Respiratory: Negative for cough, chest tightness, shortness of breath and wheezing.    Cardiovascular: Negative for chest pain, palpitations and leg swelling.   Gastrointestinal: Negative for abdominal distention, abdominal pain, blood in stool, constipation, diarrhea, nausea and vomiting.   Skin: Negative for rash.       Objective:      Physical Exam   Constitutional: She is oriented to person, place, and time. She appears well-developed and well-nourished. No distress.   HENT:   Head: Normocephalic and atraumatic.   Right Ear: Hearing, tympanic membrane, external ear and ear canal normal.   Left Ear: Hearing, tympanic membrane, external ear and ear  canal normal.   Nose: Nose normal.   Mouth/Throat: Oropharynx is clear and moist and mucous membranes are normal. No oral lesions. No oropharyngeal exudate, posterior oropharyngeal edema or posterior oropharyngeal erythema.   Eyes: Pupils are equal, round, and reactive to light. Conjunctivae, EOM and lids are normal. No scleral icterus.   Neck: Normal range of motion. Neck supple. Carotid bruit is not present. No thyroid mass and no thyromegaly present.   Cardiovascular: Normal rate, regular rhythm and normal heart sounds.  No extrasystoles are present. PMI is not displaced. Exam reveals no gallop.   No murmur heard.  Pulses:       Dorsalis pedis pulses are 2+ on the right side, and 2+ on the left side.        Posterior tibial pulses are 2+ on the right side, and 2+ on the left side.   Pulmonary/Chest: Effort normal and breath sounds normal. No accessory muscle usage. No respiratory distress.   Clear to auscultation bilaterally.   Abdominal: Soft. Normal appearance and bowel sounds are normal. She exhibits no abdominal bruit. There is no hepatosplenomegaly. There is no tenderness. There is no rebound.   Musculoskeletal:        Right foot: There is no deformity.        Left foot: There is no deformity.   Feet with corn noted   Feet:   Right Foot:   Protective Sensation: 10 sites tested. 10 sites sensed.   Skin Integrity: Positive for warmth. Negative for ulcer or callus.   Left Foot:   Protective Sensation: 10 sites tested. 10 sites sensed.   Skin Integrity: Positive for warmth. Negative for ulcer or callus.   Lymphadenopathy:        Head (right side): No submental and no submandibular adenopathy present.        Head (left side): No submental and no submandibular adenopathy present.        Right cervical: No superficial cervical, no deep cervical and no posterior cervical adenopathy present.       Left cervical: No superficial cervical, no deep cervical and no posterior cervical adenopathy present.        Right: No  "supraclavicular adenopathy present.        Left: No supraclavicular adenopathy present.   Neurological: She is alert and oriented to person, place, and time.   Skin: Skin is warm, dry and intact.   Psychiatric: She has a normal mood and affect.     Blood pressure 128/80, pulse 78, temperature 97.7 °F (36.5 °C), temperature source Oral, resp. rate 16, height 5' 6" (1.676 m), weight 65.1 kg (143 lb 9.6 oz), last menstrual period 05/23/2012, SpO2 98 %.Body mass index is 23.18 kg/m².          A/P:  1) diabetes.  Well controlled.  Continue with Janumet.  We will recheck an A1c in 6-9 months   2)    Hyperlipidemia.  Well controlled.  Continue Lipitor   4)  atypical nevi.  We will refer her to Dermatology  3)  Health maintenance issues.  We will schedule her mammogram          I will see her back in 9 months or sooner if needed  "

## 2019-07-29 ENCOUNTER — PATIENT MESSAGE (OUTPATIENT)
Dept: FAMILY MEDICINE | Facility: CLINIC | Age: 69
End: 2019-07-29

## 2019-09-03 ENCOUNTER — HOSPITAL ENCOUNTER (OUTPATIENT)
Dept: RADIOLOGY | Facility: HOSPITAL | Age: 69
Discharge: HOME | End: 2019-09-03
Attending: FAMILY MEDICINE
Payer: MEDICARE

## 2019-09-03 DIAGNOSIS — Z12.39 BREAST CANCER SCREENING: ICD-10-CM

## 2019-09-03 PROCEDURE — 77063 BREAST TOMOSYNTHESIS BI: CPT | Mod: 26,,, | Performed by: RADIOLOGY

## 2019-09-03 PROCEDURE — 77067 MAMMO DIGITAL SCREENING BILAT WITH TOMOSYNTHESIS_CAD: ICD-10-PCS | Mod: 26,,, | Performed by: RADIOLOGY

## 2019-09-03 PROCEDURE — 77067 SCR MAMMO BI INCL CAD: CPT | Mod: 26,,, | Performed by: RADIOLOGY

## 2019-09-03 PROCEDURE — 77067 SCR MAMMO BI INCL CAD: CPT | Mod: TC,PO

## 2019-09-03 PROCEDURE — 77063 MAMMO DIGITAL SCREENING BILAT WITH TOMOSYNTHESIS_CAD: ICD-10-PCS | Mod: 26,,, | Performed by: RADIOLOGY

## 2019-09-06 ENCOUNTER — PATIENT MESSAGE (OUTPATIENT)
Dept: FAMILY MEDICINE | Facility: CLINIC | Age: 69
End: 2019-09-06

## 2019-09-10 ENCOUNTER — OFFICE VISIT (OUTPATIENT)
Dept: PODIATRY | Facility: CLINIC | Age: 69
End: 2019-09-10
Payer: MEDICARE

## 2019-09-10 VITALS
HEART RATE: 66 BPM | BODY MASS INDEX: 22.82 KG/M2 | DIASTOLIC BLOOD PRESSURE: 77 MMHG | HEIGHT: 66 IN | SYSTOLIC BLOOD PRESSURE: 142 MMHG | WEIGHT: 142 LBS

## 2019-09-10 DIAGNOSIS — Q82.8 POROKERATOSIS: ICD-10-CM

## 2019-09-10 DIAGNOSIS — E11.9 WELL CONTROLLED DIABETES MELLITUS: Primary | ICD-10-CM

## 2019-09-10 PROCEDURE — 99999 PR PBB SHADOW E&M-EST. PATIENT-LVL III: CPT | Mod: PBBFAC,,, | Performed by: PODIATRIST

## 2019-09-10 PROCEDURE — 3044F HG A1C LEVEL LT 7.0%: CPT | Mod: CPTII,S$GLB,, | Performed by: PODIATRIST

## 2019-09-10 PROCEDURE — 99999 PR PBB SHADOW E&M-EST. PATIENT-LVL III: ICD-10-PCS | Mod: PBBFAC,,, | Performed by: PODIATRIST

## 2019-09-10 PROCEDURE — 99499 UNLISTED E&M SERVICE: CPT | Mod: S$GLB,,, | Performed by: PODIATRIST

## 2019-09-10 PROCEDURE — 3078F DIAST BP <80 MM HG: CPT | Mod: CPTII,S$GLB,, | Performed by: PODIATRIST

## 2019-09-10 PROCEDURE — 1101F PR PT FALLS ASSESS DOC 0-1 FALLS W/OUT INJ PAST YR: ICD-10-PCS | Mod: CPTII,S$GLB,, | Performed by: PODIATRIST

## 2019-09-10 PROCEDURE — 99499 RISK ADDL DX/OHS AUDIT: ICD-10-PCS | Mod: S$GLB,,, | Performed by: PODIATRIST

## 2019-09-10 PROCEDURE — 99203 OFFICE O/P NEW LOW 30 MIN: CPT | Mod: S$GLB,,, | Performed by: PODIATRIST

## 2019-09-10 PROCEDURE — 3077F PR MOST RECENT SYSTOLIC BLOOD PRESSURE >= 140 MM HG: ICD-10-PCS | Mod: CPTII,S$GLB,, | Performed by: PODIATRIST

## 2019-09-10 PROCEDURE — 3044F PR MOST RECENT HEMOGLOBIN A1C LEVEL <7.0%: ICD-10-PCS | Mod: CPTII,S$GLB,, | Performed by: PODIATRIST

## 2019-09-10 PROCEDURE — 3077F SYST BP >= 140 MM HG: CPT | Mod: CPTII,S$GLB,, | Performed by: PODIATRIST

## 2019-09-10 PROCEDURE — 3078F PR MOST RECENT DIASTOLIC BLOOD PRESSURE < 80 MM HG: ICD-10-PCS | Mod: CPTII,S$GLB,, | Performed by: PODIATRIST

## 2019-09-10 PROCEDURE — 99203 PR OFFICE/OUTPT VISIT, NEW, LEVL III, 30-44 MIN: ICD-10-PCS | Mod: S$GLB,,, | Performed by: PODIATRIST

## 2019-09-10 PROCEDURE — 1101F PT FALLS ASSESS-DOCD LE1/YR: CPT | Mod: CPTII,S$GLB,, | Performed by: PODIATRIST

## 2019-09-10 NOTE — LETTER
September 11, 2019      Jennifer Casillas MD  [id number] [address] [address]           [facility] [address] [facility] [address] [place] [address]  Phone: [phone]          Patient: Katherine Rivers   MR Number: [id number]   YOB: 1950   Date of Visit: 9/10/2019       Dear Dr. Jennifer Casillas:    Thank you for referring Katherine Rivers to me for evaluation. Attached you will find relevant portions of my assessment and plan of care.    If you have questions, please do not hesitate to call me. I look forward to following Katherine Rivers along with you.    Sincerely,    Jaycob Snyder, BRENDA    Enclosure  CC:  No Recipients    If you would like to receive this communication electronically, please contact [email or web address] or [phone] to request more information on [employer] access.    For providers and/or their staff who would like to refer a patient to [facility], please contact us through our one-stop-shop provider referral line, [place] [place], at [phone].    If you feel you have received this communication in error or would no longer like to receive these types of communications, please e-mail [email or web address]

## 2019-09-11 NOTE — PROGRESS NOTES
Subjective:      Patient ID: Katherine Rivers is a 69 y.o. female.    Chief Complaint: Diabetic Foot Exam (Jennifer Casillas 7-26-19, A1C 6.8 ,6-11-19)    Katherine is a 69 y.o. female who presents to the clinic upon referral from Dr. Casillas  for evaluation and treatment of diabetic feet. Katherine has a past medical history of Bilateral hearing loss, Cystocele, Diabetes mellitus, Diverticular disease, General anesthetics causing adverse effect in therapeutic use, GERD (gastroesophageal reflux disease), H/O esophagogastroduodenoscopy, History of colonoscopy, Hyperlipidemia LDL goal < 100, Irritable bowel syndrome, and Osteopenia. Patient relates no major problem with feet. Only complaints today consist of a painful corn to the plantar Lt. Forefoot that has been present for months.  Denies being overtly painful at rest, however, notes sharp pain from the site with specific weight bearing.  Has not attempted to self treat and inquires as to potential treatment options.  Notes maintaining decent control over her blood glucose.  Denies any additional pedal complaints..    PCP: Jennifer Casillas MD    Date Last Seen by PCP: 7/26/19    Hemoglobin A1C   Date Value Ref Range Status   06/11/2019 6.8 (H) 4.0 - 5.6 % Final     Comment:     ADA Screening Guidelines:  5.7-6.4%  Consistent with prediabetes  >or=6.5%  Consistent with diabetes  High levels of fetal hemoglobin interfere with the HbA1C  assay. Heterozygous hemoglobin variants (HbS, HgC, etc)do  not significantly interfere with this assay.   However, presence of multiple variants may affect accuracy.     12/06/2018 6.5 (H) 4.0 - 5.6 % Final     Comment:     ADA Screening Guidelines:  5.7-6.4%  Consistent with prediabetes  >or=6.5%  Consistent with diabetes  High levels of fetal hemoglobin interfere with the HbA1C  assay. Heterozygous hemoglobin variants (HbS, HgC, etc)do  not significantly interfere with this assay.   However, presence of multiple variants may affect accuracy.      09/12/2016 6.5 (H) 4.5 - 6.2 % Final     Comment:     According to ADA guidelines, hemoglobin A1C <7.0% represents  optimal control in non-pregnant diabetic patients.  Different  metrics may apply to specific populations.   Standards of Medical Care in Diabetes - 2016.  For the purpose of screening for the presence of diabetes:  <5.7%     Consistent with the absence of diabetes  5.7-6.4%  Consistent with increasing risk for diabetes   (prediabetes)  >or=6.5%  Consistent with diabetes  Currently no consensus exists for use of hemoglobin A1C  for diagnosis of diabetes for children.             Past Medical History:   Diagnosis Date    Bilateral hearing loss     wears hearing aides    Cystocele     Diabetes mellitus     dx 57    Diverticular disease     General anesthetics causing adverse effect in therapeutic use     faints after surgery    GERD (gastroesophageal reflux disease)     H/O esophagogastroduodenoscopy     normal 3/16    History of colonoscopy     3/16; next due 3/21    Hyperlipidemia LDL goal < 100     Irritable bowel syndrome     Osteopenia     Dexa 4/11 and 7/15 adn 11/18       Past Surgical History:   Procedure Laterality Date    BELT ABDOMINOPLASTY      BILATERAL SALPINGOOPHORECTOMY      BLADDER SUSPENSION  2/23/12     x 3    CERVICAL POLYPECTOMY      CHOLECYSTECTOMY      CHOLECYSTECTOMY-LAPAROSCOPIC N/A 5/11/2016    [facility]formed [facility] at [facility] OR    COLONOSCOPY  1/2006    by Dr. lauren barcenas (report in media section) diverticulosis, otherwise normal findings, repeat in 10 years for screening    COLONOSCOPY N/A 3/8/2016    Performed by Erickson Uribe Jr., MD at [facility] ENDO    COLPORRHAPHY      CYSTOSCOPY N/A 5/20/2014    Performed by Jeremias Plascencia [facility] at [facility] OR    CYSTOSCOPY N/A 5/2/2012    Performed by Iveth Payne [facility] at [facility] OR    dental implant      ESOPHAGOGASTRODUODENOSCOPY (EGD) N/A 3/8/2016    Performed by Erickson Uribe Jr., [facility] at [facility]  ENDO    HYSTERECTOMY  2011    [facility]    HYSTERECTOMY, TOTAL, LAPAROSCOPIC N/A 5/2/2012    [facility] OR    OOPHORECTOMY  2011    bilat    REPAIR-ANTERIOR (COLPORRHAPHY) N/A 5/20/2014    [facility] OR    REPAIR-ANTERIOR-POSTERIOR N/A 5/20/2014    [facility] at [facility] OR    SALPINGO-OOPHORECTOMY, LAPAROSCOPIC Bilateral 5/2/2012    [facility] by [facility] at [facility] OR    TUBAL LIGATION      VAGINAL DELIVERY      times 2    VAGINAL HYSTERECTOMY W/ ANTERIOR AND POSTERIOR VAGINAL REPAIR  05/20/14       Family History   Problem Relation Age of Onset    Hyperlipidemia Mother     Diabetes Father         Type 1    Cancer Father         lung + tob    Cancer Maternal Aunt         breast cancer    Breast cancer Maternal Aunt 40    Ovarian cancer Neg Hx        Social History     Socioeconomic History    Marital status:      Spouse name: Not on file    Number of children: Not on file    Years of education: Not on file    Highest education level: Not on file   Occupational History    Not on file   Social Needs    Financial resource strain: Not on file    Food insecurity:     Worry: Not on file     Inability: Not on file    Transportation needs:     Medical: Not on file     Non-medical: Not on file   Tobacco Use    Smoking status: Never Smoker    Smokeless tobacco: Never Used   Substance and Sexual Activity    Alcohol use: Yes     Alcohol/week: 0.5 oz     Types: 1 Standard drinks or equivalent per week     Frequency: Never     Comment: rarely     Drug use: No    Sexual activity: Yes     Partners: Male     Birth control/protection: Post-menopausal   Lifestyle    Physical activity:     Days per week: Not on file     Minutes per session: Not on file    Stress: Not on file   Relationships    Social connections:     Talks on phone: Not on file     Gets together: Not on file     Attends Hoahaoism service:  Not on file     Active member of club or organization: Not on file     Attends meetings of clubs or organizations: Not on file     Relationship status: Not on file   Other Topics Concern    Not on file   Social History Narrative    Not on file       Current Outpatient Medications   Medication Sig Dispense Refill    aspirin (ECOTRIN) 81 MG EC tablet Take 81 mg by mouth once daily.      atorvastatin (LIPITOR) 20 MG tablet Take 1 tablet (20 mg total) by mouth once daily. 90 tablet 3    blood sugar diagnostic (ONETOUCH ULTRA TEST) Strp Check once daily 150 strip 3    blood-glucose meter kit 1 each by Other route once daily. Use as instructed      estradiol (YUVAFEM) 10 mcg Tab INSERT 1 TABLET VAGINALLY TWICE PER WEEK 26 tablet 3    L. ACIDOPHILUS/BIFID. ANIMALIS (ONE-A-DAY TRUBIOTICS ORAL) Take 1 tablet by mouth once daily.      lancets (ONETOUCH ULTRASOFT LANCETS) Misc Pt to check blood sugar 2x daily. 100 each 5    MULTIVITAMIN WITH MINERALS (HAIR,SKIN & NAILS ORAL) Take 1 tablet by mouth 2 (two) times daily.        omeprazole (PRILOSEC) 40 MG capsule Take 1 capsule (40 mg total) by mouth once daily. 90 capsule 3    SITagliptan-metformin (JANUMET) 50-1,000 mg per tablet TAKE 1 TABLET BY MOUTH 2 (TWO) TIMES DAILY WITH A MEAL. 180 tablet 1    tretinoin (RETIN-A) 0.025 % cream nightly.        No current facility-administered medications for this visit.        Review of patient's allergies indicates:   Allergen Reactions    No known drug allergies          Review of Systems   Constitution: Negative for chills and fever.   Cardiovascular: Negative for claudication and leg swelling.   Skin: Positive for suspicious lesions. Negative for color change and nail changes.   Musculoskeletal: Negative for joint pain, joint swelling, muscle cramps and muscle weakness.   Neurological: Negative for numbness and paresthesias.   Psychiatric/Behavioral: Negative for altered mental status.           Objective:      Physical  Exam   Constitutional: She is oriented to person, place, and time. She appears well-developed and well-nourished. No distress.   Cardiovascular:   Pulses:       Dorsalis pedis pulses are 2+ on the right side, and 2+ on the left side.        Posterior tibial pulses are 2+ on the right side, and 2+ on the left side.   CFT is < 3  seconds bilateral.  Pedal hair growth is present bilateral.  Mild varicosities noted bilateral.  No lower extremity edema noted bilateral.  Toes are warm to touch bilateral.   Musculoskeletal: She exhibits tenderness. She exhibits no edema.   Muscle strength 5/5 in all muscle groups bilateral.  No tenderness nor crepitation with ROM of foot/ankle joints bilateral.  Mild pain with palpation to the lesion of the Lt. Plantar forefoot.  Bilateral rectus foot type.  Bilateral plantar forefoot fat pad atrophy.    Neurological: She is alert and oriented to person, place, and time. She has normal strength. No sensory deficit.   Protective sensation per [place] monofilament is intact bilateral.  Vibratory sensation is intact bilateral.  Light touch is intact bilateral.   Skin: Skin is warm, dry and intact. Capillary refill takes less than 2 seconds. Lesion noted. No abrasion, no bruising, no burn, no ecchymosis, no laceration, no petechiae and no rash noted. She is not diaphoretic. No erythema. No pallor.   Pedal skin has normal turgor, temperature, and texture bilateral.  Toenails x 10 appear normotrophic. Hyperkeratotic lesion noted to the Lt. Sub 5th met head.             Assessment:       Encounter Diagnoses   Name Primary?    Well controlled diabetes mellitus Yes    Porokeratosis          Plan:       Katherine was seen today for diabetic foot exam.    Diagnoses and all orders for this visit:    Well controlled diabetes mellitus    Porokeratosis      I counseled the patient on her conditions, their implications and medical management.    Shoe inspection. Diabetic Foot Education. Patient  reminded of the importance of good nutrition and blood sugar control to help prevent podiatric complications of diabetes. Patient instructed on proper foot hygeine. We discussed wearing proper shoe gear, daily foot inspections, never walking without protective shoe gear, never putting sharp instruments to feet    Advised to regularly file the lesion of the Lt. Plantar forefoot every two to three days and to regularly apply Amlactin lotion to the site.     With patient's permission, a sterile #15 scalpel was used to trim lesion x 1 down to smooth appearing skin without incident.  This was performed as a courtesy with today's exam. Patient relates relief following the procedure. She will continue to monitor the areas daily, inspect her feet, wear protective shoe gear when ambulatory, moisturizer to maintain skin integrity and follow in this office in approximately 12 months, sooner p.r.n.    Follow up in about 1 year (around 9/10/2020).    Jaycob Snyder DPM

## 2019-09-17 ENCOUNTER — INITIAL CONSULT (OUTPATIENT)
Dept: DERMATOLOGY | Facility: CLINIC | Age: 69
End: 2019-09-17
Payer: MEDICARE

## 2019-09-17 VITALS — HEIGHT: 66 IN | RESPIRATION RATE: 17 BRPM | WEIGHT: 142 LBS | BODY MASS INDEX: 22.82 KG/M2

## 2019-09-17 DIAGNOSIS — L82.1 SEBORRHEIC KERATOSES: ICD-10-CM

## 2019-09-17 DIAGNOSIS — L70.0 OPEN COMEDONE: ICD-10-CM

## 2019-09-17 DIAGNOSIS — D22.9 MULTIPLE BENIGN NEVI: ICD-10-CM

## 2019-09-17 DIAGNOSIS — L81.4 LENTIGINES: Primary | ICD-10-CM

## 2019-09-17 DIAGNOSIS — D18.00 ANGIOMA: ICD-10-CM

## 2019-09-17 DIAGNOSIS — L91.8 CUTANEOUS SKIN TAGS: ICD-10-CM

## 2019-09-17 PROCEDURE — 99213 PR OFFICE/OUTPT VISIT, EST, LEVL III, 20-29 MIN: ICD-10-PCS | Mod: S$GLB,,, | Performed by: DERMATOLOGY

## 2019-09-17 PROCEDURE — 1101F PR PT FALLS ASSESS DOC 0-1 FALLS W/OUT INJ PAST YR: ICD-10-PCS | Mod: CPTII,S$GLB,, | Performed by: DERMATOLOGY

## 2019-09-17 PROCEDURE — 99999 PR PBB SHADOW E&M-EST. PATIENT-LVL III: CPT | Mod: PBBFAC,,, | Performed by: DERMATOLOGY

## 2019-09-17 PROCEDURE — 1101F PT FALLS ASSESS-DOCD LE1/YR: CPT | Mod: CPTII,S$GLB,, | Performed by: DERMATOLOGY

## 2019-09-17 PROCEDURE — 99213 OFFICE O/P EST LOW 20 MIN: CPT | Mod: S$GLB,,, | Performed by: DERMATOLOGY

## 2019-09-17 PROCEDURE — 99999 PR PBB SHADOW E&M-EST. PATIENT-LVL III: ICD-10-PCS | Mod: PBBFAC,,, | Performed by: DERMATOLOGY

## 2019-09-17 NOTE — PROGRESS NOTES
Subjective:       Patient ID:  Katherine Rivers is a 69 y.o. female who presents for   Chief Complaint   Patient presents with    Mole     HPI  70 yo F presents today with her  for skin check.  She has several rough brown spots that she is interested in having removed. She denies bleeding or itching. No prior treatments.  She was referred by Dr. Casillas.    Denies personal or family h/o skin cancer    Past Medical History:   Diagnosis Date    Bilateral hearing loss     wears hearing aides    Cystocele     Diabetes mellitus     dx 57    Diverticular disease     General anesthetics causing adverse effect in therapeutic use     faints after surgery    GERD (gastroesophageal reflux disease)     H/O esophagogastroduodenoscopy     normal 3/16    History of colonoscopy     3/16; next due 3/21    Hyperlipidemia LDL goal < 100     Irritable bowel syndrome     Osteopenia     Dexa 4/11 and 7/15 adn 11/18     Denies personal or family h/o skin cancer    Review of Systems   Hematologic/Lymphatic: Does not bruise/bleed easily.   Allergic/Immunologic: Negative for environmental allergies.        Objective:    Physical Exam   Constitutional: She appears well-developed and well-nourished.   HENT:   Mouth/Throat: Lips normal.    Eyes: Lids are normal.    Neurological: She is alert and oriented to person, place, and time.   Psychiatric: She has a normal mood and affect.   Skin:   Areas Examined (abnormalities noted in diagram):   Scalp / Hair Palpated and Inspected  Head / Face Inspection Performed  Neck Inspection Performed  Chest / Axilla Inspection Performed  Abdomen Inspection Performed  Back Inspection Performed  RUE Inspected  LUE Inspection Performed  RLE Inspected  LLE Inspection Performed                       Diagram Legend     Erythematous scaling macule/papule c/w actinic keratosis       Vascular papule c/w angioma      Pigmented verrucoid papule/plaque c/w seborrheic keratosis      Yellow umbilicated  papule c/w sebaceous hyperplasia      Irregularly shaped tan macule c/w lentigo     1-2 mm smooth white papules consistent with Milia      Movable subcutaneous cyst with punctum c/w epidermal inclusion cyst      Subcutaneous movable cyst c/w pilar cyst      Firm pink to brown papule c/w dermatofibroma      Pedunculated fleshy papule(s) c/w skin tag(s)      Evenly pigmented macule c/w junctional nevus     Mildly variegated pigmented, slightly irregular-bordered macule c/w mildly atypical nevus      Flesh colored to evenly pigmented papule c/w intradermal nevus       Pink pearly papule/plaque c/w basal cell carcinoma      Erythematous hyperkeratotic cursted plaque c/w SCC      Surgical scar with no sign of skin cancer recurrence      Open and closed comedones      Inflammatory papules and pustules      Verrucoid papule consistent consistent with wart     Erythematous eczematous patches and plaques     Dystrophic onycholytic nail with subungual debris c/w onychomycosis     Umbilicated papule    Erythematous-base heme-crusted tan verrucoid plaque consistent with inflamed seborrheic keratosis     Erythematous Silvery Scaling Plaque c/w Psoriasis     See annotation      Assessment / Plan:        Lentigines  These are benign hyperpigmented sun induced lesions. Daily sun protection will reduce the number of new lesions.     Seborrheic keratoses  These are benign inherited growths without a malignant potential. Reassurance given to patient. No treatment is necessary.     Multiple benign nevi  Reassurance that her nevi appear benign with regular and consistent pigment pattern on dermoscopy    Angioma  This is a benign vascular lesion. Reassurance given. No treatment required.     Cutaneous skin tags  SAMUEL Quintanilla at follow up    Open comedone  Extraction at follow up         Follow up for cosmetic visit for tags, SKs.

## 2019-09-24 ENCOUNTER — PROCEDURE VISIT (OUTPATIENT)
Dept: DERMATOLOGY | Facility: CLINIC | Age: 69
End: 2019-09-24
Payer: MEDICARE

## 2019-09-24 ENCOUNTER — OFFICE VISIT (OUTPATIENT)
Dept: DERMATOLOGY | Facility: CLINIC | Age: 69
End: 2019-09-24
Payer: MEDICARE

## 2019-09-24 VITALS — BODY MASS INDEX: 22.66 KG/M2 | WEIGHT: 141 LBS | HEIGHT: 66 IN | RESPIRATION RATE: 16 BRPM

## 2019-09-24 DIAGNOSIS — L91.8 CUTANEOUS SKIN TAGS: ICD-10-CM

## 2019-09-24 DIAGNOSIS — L82.1 SEBORRHEIC KERATOSES: Primary | ICD-10-CM

## 2019-09-24 DIAGNOSIS — D48.5 NEOPLASM OF UNCERTAIN BEHAVIOR OF SKIN: Primary | ICD-10-CM

## 2019-09-24 PROCEDURE — 11200 RMVL SKIN TAGS UP TO&INC 15: CPT | Mod: CSM,S$GLB,, | Performed by: DERMATOLOGY

## 2019-09-24 PROCEDURE — 88305 TISSUE SPECIMEN TO PATHOLOGY, DERMATOLOGY: ICD-10-PCS | Mod: 26,,, | Performed by: PATHOLOGY

## 2019-09-24 PROCEDURE — 88305 TISSUE EXAM BY PATHOLOGIST: CPT | Performed by: PATHOLOGY

## 2019-09-24 PROCEDURE — 99499 NO LOS: ICD-10-PCS | Mod: ,,, | Performed by: DERMATOLOGY

## 2019-09-24 PROCEDURE — 99499 UNLISTED E&M SERVICE: CPT | Mod: ,,, | Performed by: DERMATOLOGY

## 2019-09-24 PROCEDURE — 11200 PR REMOVAL OF SKIN TAGS, UP TO 15: ICD-10-PCS | Mod: CSM,S$GLB,, | Performed by: DERMATOLOGY

## 2019-09-24 PROCEDURE — 99999 PR PBB SHADOW E&M-EST. PATIENT-LVL II: CPT | Mod: PBBFAC,,, | Performed by: DERMATOLOGY

## 2019-09-24 PROCEDURE — 11102 TANGNTL BX SKIN SINGLE LES: CPT | Mod: S$GLB,,, | Performed by: DERMATOLOGY

## 2019-09-24 PROCEDURE — 11102 PR TANGENTIAL BIOPSY, SKIN, SINGLE LESION: ICD-10-PCS | Mod: S$GLB,,, | Performed by: DERMATOLOGY

## 2019-09-24 PROCEDURE — 99999 PR PBB SHADOW E&M-EST. PATIENT-LVL II: ICD-10-PCS | Mod: PBBFAC,,, | Performed by: DERMATOLOGY

## 2019-09-24 RX ORDER — TRETINOIN 0.25 MG/G
CREAM TOPICAL NIGHTLY
Qty: 45 G | Refills: 1 | Status: SHIPPED | OUTPATIENT
Start: 2019-09-24

## 2019-09-24 NOTE — PROGRESS NOTES
Subjective:       Patient ID:  Katherine Rivers is a 69 y.o. female who presents for   Chief Complaint   Patient presents with    Follow-up    Skin Tags     70 y/o F presents for cosmetic appt for SK treatment and skin tag removal. Last OV 9-17-19     No phx skin ca       Review of Systems   Skin: Positive for daily sunscreen use and activity-related sunscreen use. Negative for tendency to form keloidal scars.   Hematologic/Lymphatic: Does not bruise/bleed easily.      Past Medical History:   Diagnosis Date    Bilateral hearing loss     wears hearing aides    Cystocele     Diabetes mellitus     dx 57    Diverticular disease     General anesthetics causing adverse effect in therapeutic use     faints after surgery    GERD (gastroesophageal reflux disease)     H/O esophagogastroduodenoscopy     normal 3/16    History of colonoscopy     3/16; next due 3/21    Hyperlipidemia LDL goal < 100     Irritable bowel syndrome     Osteopenia     Dexa 4/11 and 7/15 adn 11/18       Objective:    Physical Exam   Constitutional: She appears well-developed and well-nourished.   HENT:   Mouth/Throat: Lips normal.    Eyes: Lids are normal.    Neurological: She is alert and oriented to person, place, and time.   Psychiatric: She has a normal mood and affect.   Skin:   Areas Examined (abnormalities noted in diagram):   Head / Face Inspection Performed  Neck Inspection Performed  Chest / Axilla Inspection Performed  Abdomen Inspection Performed  Back Inspection Performed  RLE Inspected                       Diagram Legend     Erythematous scaling macule/papule c/w actinic keratosis       Vascular papule c/w angioma      Pigmented verrucoid papule/plaque c/w seborrheic keratosis      Yellow umbilicated papule c/w sebaceous hyperplasia      Irregularly shaped tan macule c/w lentigo     1-2 mm smooth white papules consistent with Milia      Movable subcutaneous cyst with punctum c/w epidermal inclusion cyst      Subcutaneous  movable cyst c/w pilar cyst      Firm pink to brown papule c/w dermatofibroma      Pedunculated fleshy papule(s) c/w skin tag(s)      Evenly pigmented macule c/w junctional nevus     Mildly variegated pigmented, slightly irregular-bordered macule c/w mildly atypical nevus      Flesh colored to evenly pigmented papule c/w intradermal nevus       Pink pearly papule/plaque c/w basal cell carcinoma      Erythematous hyperkeratotic cursted plaque c/w SCC      Surgical scar with no sign of skin cancer recurrence      Open and closed comedones      Inflammatory papules and pustules      Verrucoid papule consistent consistent with wart     Erythematous eczematous patches and plaques     Dystrophic onycholytic nail with subungual debris c/w onychomycosis     Umbilicated papule    Erythematous-base heme-crusted tan verrucoid plaque consistent with inflamed seborrheic keratosis     Erythematous Silvery Scaling Plaque c/w Psoriasis     See annotation      Assessment / Plan:        Seborrheic keratoses    Cutaneous skin tags    Cryosurgery procedure note:    Verbal consent from the patient is obtained. Liquid nitrogen cryosurgery is applied to 13 lesions to produce a freeze injury. The patient is aware that blisters may form and is instructed on wound care with gentle cleansing and use of vaseline ointment to keep moist until healed. The patient is supplied a handout on cryosurgery and is instructed to call if lesions do not completely resolve.    Scissor snip procedure note:    Scissor snip performed after verbal consent including risk of infection, scar, recurrence, need for additional treatment of site. Lesional tissue snipped. No complications. Dressing applied. Wound care explained.           Follow up if symptoms worsen or fail to improve.

## 2019-09-24 NOTE — PROGRESS NOTES
Subjective:       Patient ID:  Katherine Rivers is a 69 y.o. female who presents for   Chief Complaint   Patient presents with    Mole     L thigh     HPI  70 yo F presents for evaluation of a mole on her L thigh.  It is irritated by her clothing.  She would like to have it removed  She would also like a Rx for her tretinoin.  She has been using it for many years    Past Medical History:   Diagnosis Date    Bilateral hearing loss     wears hearing aides    Cystocele     Diabetes mellitus     dx 57    Diverticular disease     General anesthetics causing adverse effect in therapeutic use     faints after surgery    GERD (gastroesophageal reflux disease)     H/O esophagogastroduodenoscopy     normal 3/16    History of colonoscopy     3/16; next due 3/21    Hyperlipidemia LDL goal < 100     Irritable bowel syndrome     Osteopenia     Dexa 4/11 and 7/15 adn 11/18     Review of Systems   Skin: Positive for activity-related sunscreen use. Negative for daily sunscreen use, sensitivity to antibiotic ointment, sensitivity to bandage adhesive and tendency to form keloidal scars.   Hematologic/Lymphatic: Does not bruise/bleed easily.   Allergic/Immunologic: Negative for environmental allergies.        Objective:    Physical Exam   Constitutional: She appears well-developed and well-nourished.   Neurological: She is alert and oriented to person, place, and time.   Psychiatric: She has a normal mood and affect.   Skin:   Areas Examined (abnormalities noted in diagram):   Head / Face Inspection Performed  LLE Inspection Performed              Diagram Legend     Erythematous scaling macule/papule c/w actinic keratosis       Vascular papule c/w angioma      Pigmented verrucoid papule/plaque c/w seborrheic keratosis      Yellow umbilicated papule c/w sebaceous hyperplasia      Irregularly shaped tan macule c/w lentigo     1-2 mm smooth white papules consistent with Milia      Movable subcutaneous cyst with punctum c/w  epidermal inclusion cyst      Subcutaneous movable cyst c/w pilar cyst      Firm pink to brown papule c/w dermatofibroma      Pedunculated fleshy papule(s) c/w skin tag(s)      Evenly pigmented macule c/w junctional nevus     Mildly variegated pigmented, slightly irregular-bordered macule c/w mildly atypical nevus      Flesh colored to evenly pigmented papule c/w intradermal nevus       Pink pearly papule/plaque c/w basal cell carcinoma      Erythematous hyperkeratotic cursted plaque c/w SCC      Surgical scar with no sign of skin cancer recurrence      Open and closed comedones      Inflammatory papules and pustules      Verrucoid papule consistent consistent with wart     Erythematous eczematous patches and plaques     Dystrophic onycholytic nail with subungual debris c/w onychomycosis     Umbilicated papule    Erythematous-base heme-crusted tan verrucoid plaque consistent with inflamed seborrheic keratosis     Erythematous Silvery Scaling Plaque c/w Psoriasis     See annotation      Assessment / Plan:      Pathology Orders:     Normal Orders This Visit    Tissue Specimen To Pathology, Dermatology     Questions:    Directional Terms:  Other(comment)    Clinical Information:  irritated nevus vs other    Specific Site:  L inner thigh        Neoplasm of uncertain behavior of skin  -     Tissue Specimen To Pathology, Dermatology  Shave biopsy procedure note:    Shave biopsy performed after verbal consent including risk of infection, scar, recurrence, need for additional treatment of site. Area prepped with alcohol, anesthetized with approximately 1.0cc of 1% lidocaine with epinephrine. Lesional tissue shaved with razor blade. Hemostasis achieved with application of aluminum chloride followed by hyfrecation. No complications. Dressing applied. Wound care explained.      Other orders  -     tretinoin (RETIN-A) 0.025 % cream; Apply topically every evening.  Dispense: 45 g; Refill: 1             Follow up for pending  Pathology.

## 2019-10-07 ENCOUNTER — TELEPHONE (OUTPATIENT)
Dept: DERMATOLOGY | Facility: CLINIC | Age: 69
End: 2019-10-07

## 2019-10-07 NOTE — TELEPHONE ENCOUNTER
Contacted pt;s  with result .   ----- Message from Ange Tuttle MD sent at 10/6/2019  4:37 PM CDT -----  Skin, left inner thigh, shave biopsy:  - MELANOCYTIC NEVUS, INTRADERMAL TYPE    Benign nevus  No further action necessary  Thanks  YADI

## 2020-01-07 ENCOUNTER — CLINICAL SUPPORT (OUTPATIENT)
Dept: FAMILY MEDICINE | Facility: CLINIC | Age: 70
End: 2020-01-07
Payer: MEDICARE

## 2020-01-07 ENCOUNTER — PATIENT MESSAGE (OUTPATIENT)
Dept: FAMILY MEDICINE | Facility: CLINIC | Age: 70
End: 2020-01-07

## 2020-01-07 DIAGNOSIS — R39.9 UTI SYMPTOMS: Primary | ICD-10-CM

## 2020-01-07 DIAGNOSIS — R39.9 UTI SYMPTOMS: ICD-10-CM

## 2020-01-07 PROCEDURE — 87086 URINE CULTURE/COLONY COUNT: CPT

## 2020-01-07 PROCEDURE — 81000 URINALYSIS NONAUTO W/SCOPE: CPT | Mod: PO

## 2020-01-07 PROCEDURE — 87186 SC STD MICRODIL/AGAR DIL: CPT

## 2020-01-07 PROCEDURE — 87088 URINE BACTERIA CULTURE: CPT

## 2020-01-07 PROCEDURE — 87077 CULTURE AEROBIC IDENTIFY: CPT

## 2020-01-08 ENCOUNTER — PATIENT MESSAGE (OUTPATIENT)
Dept: FAMILY MEDICINE | Facility: CLINIC | Age: 70
End: 2020-01-08

## 2020-01-08 LAB
BACTERIA #/AREA URNS HPF: ABNORMAL /HPF
BILIRUB UR QL STRIP: NEGATIVE
CLARITY UR REFRACT.AUTO: ABNORMAL
COLOR UR AUTO: YELLOW
GLUCOSE UR QL STRIP: NEGATIVE
HGB UR QL STRIP: ABNORMAL
KETONES UR QL STRIP: NEGATIVE
LEUKOCYTE ESTERASE UR QL STRIP: ABNORMAL
MICROSCOPIC COMMENT: ABNORMAL
NITRITE UR QL STRIP: POSITIVE
PH UR STRIP: 6 [PH] (ref 5–8)
PROT UR QL STRIP: NEGATIVE
RBC #/AREA URNS HPF: 4 /HPF (ref 0–4)
SP GR UR STRIP: 1.02 (ref 1–1.03)
URN SPEC COLLECT METH UR: ABNORMAL
WBC #/AREA URNS HPF: >100 /HPF (ref 0–5)

## 2020-01-08 RX ORDER — SULFAMETHOXAZOLE AND TRIMETHOPRIM 800; 160 MG/1; MG/1
1 TABLET ORAL 2 TIMES DAILY
Qty: 6 TABLET | Refills: 0 | Status: SHIPPED | OUTPATIENT
Start: 2020-01-08 | End: 2020-01-11

## 2020-01-10 LAB — BACTERIA UR CULT: ABNORMAL

## 2020-01-11 ENCOUNTER — PATIENT MESSAGE (OUTPATIENT)
Dept: FAMILY MEDICINE | Facility: CLINIC | Age: 70
End: 2020-01-11

## 2020-03-03 ENCOUNTER — PATIENT OUTREACH (OUTPATIENT)
Dept: ADMINISTRATIVE | Facility: HOSPITAL | Age: 70
End: 2020-03-03

## 2020-03-03 DIAGNOSIS — E11.9 DIABETES MELLITUS WITHOUT COMPLICATION: Primary | ICD-10-CM

## 2020-03-03 NOTE — PROGRESS NOTES
Chart review completed 03/03/2020.  Care Everywhere updates requested and reviewed.  Immunizations reconciled. Media reviewed.     Labs not in [facility] or [facility]

## 2020-03-05 ENCOUNTER — PATIENT MESSAGE (OUTPATIENT)
Dept: ADMINISTRATIVE | Facility: HOSPITAL | Age: 70
End: 2020-03-05

## 2020-03-11 ENCOUNTER — LAB VISIT (OUTPATIENT)
Dept: LAB | Facility: HOSPITAL | Age: 70
End: 2020-03-11
Attending: FAMILY MEDICINE
Payer: MEDICARE

## 2020-03-11 DIAGNOSIS — E11.9 DIABETES MELLITUS WITHOUT COMPLICATION: ICD-10-CM

## 2020-03-11 LAB
ESTIMATED AVG GLUCOSE: 160 MG/DL (ref 68–131)
HBA1C MFR BLD HPLC: 7.2 % (ref 4–5.6)

## 2020-03-11 PROCEDURE — 36415 COLL VENOUS BLD VENIPUNCTURE: CPT | Mod: PO

## 2020-03-11 PROCEDURE — 83036 HEMOGLOBIN GLYCOSYLATED A1C: CPT

## 2020-03-12 ENCOUNTER — PATIENT MESSAGE (OUTPATIENT)
Dept: FAMILY MEDICINE | Facility: CLINIC | Age: 70
End: 2020-03-12

## 2020-03-17 ENCOUNTER — OFFICE VISIT (OUTPATIENT)
Dept: FAMILY MEDICINE | Facility: CLINIC | Age: 70
End: 2020-03-17
Payer: MEDICARE

## 2020-03-17 VITALS
HEIGHT: 66 IN | HEART RATE: 94 BPM | WEIGHT: 142.63 LBS | BODY MASS INDEX: 22.92 KG/M2 | RESPIRATION RATE: 18 BRPM | TEMPERATURE: 99 F | DIASTOLIC BLOOD PRESSURE: 70 MMHG | SYSTOLIC BLOOD PRESSURE: 142 MMHG

## 2020-03-17 DIAGNOSIS — E11.8 DIABETES MELLITUS TYPE 2 WITH COMPLICATIONS: Primary | ICD-10-CM

## 2020-03-17 PROCEDURE — 3051F HG A1C>EQUAL 7.0%<8.0%: CPT | Mod: CPTII,S$GLB,, | Performed by: FAMILY MEDICINE

## 2020-03-17 PROCEDURE — 1159F MED LIST DOCD IN RCRD: CPT | Mod: S$GLB,,, | Performed by: FAMILY MEDICINE

## 2020-03-17 PROCEDURE — 1126F PR PAIN SEVERITY QUANTIFIED, NO PAIN PRESENT: ICD-10-PCS | Mod: S$GLB,,, | Performed by: FAMILY MEDICINE

## 2020-03-17 PROCEDURE — 1101F PT FALLS ASSESS-DOCD LE1/YR: CPT | Mod: CPTII,S$GLB,, | Performed by: FAMILY MEDICINE

## 2020-03-17 PROCEDURE — 3078F DIAST BP <80 MM HG: CPT | Mod: CPTII,S$GLB,, | Performed by: FAMILY MEDICINE

## 2020-03-17 PROCEDURE — 99499 RISK ADDL DX/OHS AUDIT: ICD-10-PCS | Mod: S$GLB,,, | Performed by: FAMILY MEDICINE

## 2020-03-17 PROCEDURE — 3078F PR MOST RECENT DIASTOLIC BLOOD PRESSURE < 80 MM HG: ICD-10-PCS | Mod: CPTII,S$GLB,, | Performed by: FAMILY MEDICINE

## 2020-03-17 PROCEDURE — 1101F PR PT FALLS ASSESS DOC 0-1 FALLS W/OUT INJ PAST YR: ICD-10-PCS | Mod: CPTII,S$GLB,, | Performed by: FAMILY MEDICINE

## 2020-03-17 PROCEDURE — 1159F PR MEDICATION LIST DOCUMENTED IN MEDICAL RECORD: ICD-10-PCS | Mod: S$GLB,,, | Performed by: FAMILY MEDICINE

## 2020-03-17 PROCEDURE — 1126F AMNT PAIN NOTED NONE PRSNT: CPT | Mod: S$GLB,,, | Performed by: FAMILY MEDICINE

## 2020-03-17 PROCEDURE — 99499 UNLISTED E&M SERVICE: CPT | Mod: S$GLB,,, | Performed by: FAMILY MEDICINE

## 2020-03-17 PROCEDURE — 3051F PR MOST RECENT HEMOGLOBIN A1C LEVEL 7.0 - < 8.0%: ICD-10-PCS | Mod: CPTII,S$GLB,, | Performed by: FAMILY MEDICINE

## 2020-03-17 PROCEDURE — 3077F PR MOST RECENT SYSTOLIC BLOOD PRESSURE >= 140 MM HG: ICD-10-PCS | Mod: CPTII,S$GLB,, | Performed by: FAMILY MEDICINE

## 2020-03-17 PROCEDURE — 99213 OFFICE O/P EST LOW 20 MIN: CPT | Mod: S$GLB,,, | Performed by: FAMILY MEDICINE

## 2020-03-17 PROCEDURE — 3077F SYST BP >= 140 MM HG: CPT | Mod: CPTII,S$GLB,, | Performed by: FAMILY MEDICINE

## 2020-03-17 PROCEDURE — 99213 PR OFFICE/OUTPT VISIT, EST, LEVL III, 20-29 MIN: ICD-10-PCS | Mod: S$GLB,,, | Performed by: FAMILY MEDICINE

## 2020-03-21 ENCOUNTER — PATIENT MESSAGE (OUTPATIENT)
Dept: FAMILY MEDICINE | Facility: CLINIC | Age: 70
End: 2020-03-21

## 2020-03-21 NOTE — PROGRESS NOTES
Subjective:       Patient ID: Katherine Rivers is a 69 y.o. female.    Chief Complaint: Follow-up (Follow up with labs prior)    HPI   The patient is a 69-year-old who is here today to follow-up on her diabetes.  Her most recent A1c was 7.2 up from a prior value of 6.8.  She believes that her diabetes level increased due to an infected dental implant that had been going on for months before it was discovered.  She has been undergoing dental work to address this infection and now has that process behind her.  She has recently been checking her sugars and they have been in the 100-123 range.  Before her labs, her sugars had been up into the 150s but since addressing the dental issue her sugars have improved.  Also with lent, she has started the Mediterranean diet and his avoiding sweets the and she expects these changes will also improve her diabetic control.  She is currently taking Janumet half a tablet twice a day.  Previously she had taken Janumet a whole tablet twice a day but it was hard on her stomach.    Of note, her stomach issues have really cleared up and have drastically improved.  She is no longer having any abdominal pain or diarrhea.    Review of Systems   Constitutional: Positive for fatigue. Negative for appetite change, chills, diaphoresis, fever and unexpected weight change.   HENT: Negative for congestion, ear pain, postnasal drip, rhinorrhea, sinus pressure, sneezing, sore throat and trouble swallowing.    Eyes: Negative for pain, discharge and visual disturbance.   Respiratory: Negative for cough, chest tightness, shortness of breath and wheezing.    Cardiovascular: Negative for chest pain, palpitations and leg swelling.   Gastrointestinal: Negative for abdominal distention, abdominal pain, blood in stool, constipation, diarrhea, nausea and vomiting.   Endocrine: Positive for polyphagia. Negative for polydipsia and polyuria.   Skin: Negative for pallor and rash.   Neurological: Negative for  dizziness, tremors, seizures, speech difficulty, weakness and headaches.   Psychiatric/Behavioral: Negative for confusion. The patient is not nervous/anxious.        Objective:      Physical Exam   Constitutional: She is oriented to person, place, and time. She appears well-developed and well-nourished. No distress.   HENT:   Head: Normocephalic and atraumatic.   Right Ear: Hearing, tympanic membrane, external ear and ear canal normal.   Left Ear: Hearing, tympanic membrane, external ear and ear canal normal.   Nose: Nose normal.   Mouth/Throat: Oropharynx is clear and moist and mucous membranes are normal. No oral lesions. No oropharyngeal exudate, posterior oropharyngeal edema or posterior oropharyngeal erythema.   Eyes: Pupils are equal, round, and reactive to light. Conjunctivae, EOM and lids are normal. No scleral icterus.   Neck: Normal range of motion. Neck supple. Carotid bruit is not present. No thyroid mass and no thyromegaly present.   Cardiovascular: Normal rate, regular rhythm and normal heart sounds.  No extrasystoles are present. PMI is not displaced. Exam reveals no gallop.   No murmur heard.  Pulmonary/Chest: Effort normal and breath sounds normal. No accessory muscle usage. No respiratory distress.   Clear to auscultation bilaterally.   Abdominal: Soft. Normal appearance and bowel sounds are normal. She exhibits no abdominal bruit. There is no hepatosplenomegaly. There is no tenderness. There is no rebound.   Lymphadenopathy:        Head (right side): No submental and no submandibular adenopathy present.        Head (left side): No submental and no submandibular adenopathy present.        Right cervical: No superficial cervical, no deep cervical and no posterior cervical adenopathy present.       Left cervical: No superficial cervical, no deep cervical and no posterior cervical adenopathy present.        Right: No supraclavicular adenopathy present.        Left: No supraclavicular adenopathy  "present.   Neurological: She is alert and oriented to person, place, and time.   Skin: Skin is warm, dry and intact.   Psychiatric: She has a normal mood and affect.     Blood pressure (!) 142/70, pulse 94, temperature 98.6 °F (37 °C), temperature source Oral, resp. rate 18, height 5' 6" (1.676 m), weight 64.7 kg (142 lb 10.2 oz), last menstrual period 05/23/2012.Body mass index is 23.02 kg/m².          A/P:  1) diabetes.  Fairly well controlled.  For now will continue with her current dose of Janumet half a tablet twice a day.  If her fasting sugar readings are consistently higher than 140, she will let me know.  We will plan to recheck fasting labs again in 6 months    As long as she does well, I will see her back in 6 months with fasting labs prior to that visit  the  "

## 2020-05-05 ENCOUNTER — PATIENT MESSAGE (OUTPATIENT)
Dept: ADMINISTRATIVE | Facility: HOSPITAL | Age: 70
End: 2020-05-05

## 2020-06-17 ENCOUNTER — LAB VISIT (OUTPATIENT)
Dept: LAB | Facility: HOSPITAL | Age: 70
End: 2020-06-17
Attending: FAMILY MEDICINE
Payer: MEDICARE

## 2020-06-17 DIAGNOSIS — E11.8 DIABETES MELLITUS TYPE 2 WITH COMPLICATIONS: ICD-10-CM

## 2020-06-17 LAB
ALBUMIN/CREAT UR: 45.5 UG/MG (ref 0–30)
CREAT UR-MCNC: 55 MG/DL (ref 15–325)
MICROALBUMIN UR DL<=1MG/L-MCNC: 25 UG/ML

## 2020-06-17 PROCEDURE — 82043 UR ALBUMIN QUANTITATIVE: CPT

## 2020-06-19 ENCOUNTER — PATIENT MESSAGE (OUTPATIENT)
Dept: FAMILY MEDICINE | Facility: CLINIC | Age: 70
End: 2020-06-19

## 2020-06-19 DIAGNOSIS — R80.9 MICROALBUMINURIA: Primary | ICD-10-CM

## 2020-06-30 NOTE — LETTER
I have reviewed discharge instructions with the patient. The patient verbalized understanding. September 17, 2019      Jennifer Casillas MD  [id number] [address] [address]           [facility]  [address] [facility] [address] [place] [address]  Phone: [phone]  Fax: [phone]          Patient: Katherine Rivers   MR Number: [id number]   YOB: 1950   Date of Visit: 9/17/2019       Dear Dr. Jennifer Casillas:    Thank you for referring Katherine Rivers to me for evaluation. Attached you will find relevant portions of my assessment and plan of care.    If you have questions, please do not hesitate to call me. I look forward to following Katherine Rivers along with you.    Sincerely,    Ange Tuttle MD    Enclosure  CC:  No Recipients    If you would like to receive this communication electronically, please contact [email or web address] or [phone] to request more information on [facility] Link access.    For providers and/or their staff who would like to refer a patient to [facility], please contact us through our one-stop-shop provider referral line, [facility], at [phone].    If you feel you have received this communication in error or would no longer like to receive these types of communications, please e-mail [email or web address]

## 2020-08-26 ENCOUNTER — PATIENT OUTREACH (OUTPATIENT)
Dept: ADMINISTRATIVE | Facility: HOSPITAL | Age: 70
End: 2020-08-26

## 2020-10-12 DIAGNOSIS — Z12.31 ENCOUNTER FOR SCREENING MAMMOGRAM FOR MALIGNANT NEOPLASM OF BREAST: Primary | ICD-10-CM

## 2020-10-22 ENCOUNTER — HOSPITAL ENCOUNTER (OUTPATIENT)
Dept: RADIOLOGY | Facility: HOSPITAL | Age: 70
Discharge: HOME | End: 2020-10-22
Attending: FAMILY MEDICINE
Payer: MEDICARE

## 2020-10-22 DIAGNOSIS — Z12.31 ENCOUNTER FOR SCREENING MAMMOGRAM FOR MALIGNANT NEOPLASM OF BREAST: ICD-10-CM

## 2020-10-22 PROCEDURE — 77067 SCR MAMMO BI INCL CAD: CPT | Mod: TC,PO

## 2020-10-22 PROCEDURE — 77063 BREAST TOMOSYNTHESIS BI: CPT | Mod: 26,,, | Performed by: RADIOLOGY

## 2020-10-22 PROCEDURE — 77063 MAMMO DIGITAL SCREENING BILAT WITH TOMO: ICD-10-PCS | Mod: 26,,, | Performed by: RADIOLOGY

## 2020-10-22 PROCEDURE — 77067 SCR MAMMO BI INCL CAD: CPT | Mod: 26,,, | Performed by: RADIOLOGY

## 2020-10-22 PROCEDURE — 77067 MAMMO DIGITAL SCREENING BILAT WITH TOMO: ICD-10-PCS | Mod: 26,,, | Performed by: RADIOLOGY

## 2020-10-23 ENCOUNTER — PATIENT MESSAGE (OUTPATIENT)
Dept: FAMILY MEDICINE | Facility: CLINIC | Age: 70
End: 2020-10-23

## 2021-01-04 ENCOUNTER — PATIENT MESSAGE (OUTPATIENT)
Dept: ADMINISTRATIVE | Facility: HOSPITAL | Age: 71
End: 2021-01-04

## 2021-01-04 DIAGNOSIS — E11.9 DIABETES MELLITUS WITHOUT COMPLICATION: Primary | ICD-10-CM

## 2021-01-06 ENCOUNTER — PATIENT MESSAGE (OUTPATIENT)
Dept: ADMINISTRATIVE | Facility: HOSPITAL | Age: 71
End: 2021-01-06

## 2021-01-13 ENCOUNTER — PATIENT MESSAGE (OUTPATIENT)
Dept: FAMILY MEDICINE | Facility: CLINIC | Age: 71
End: 2021-01-13

## 2021-01-13 ENCOUNTER — LAB VISIT (OUTPATIENT)
Dept: LAB | Facility: HOSPITAL | Age: 71
End: 2021-01-13
Attending: FAMILY MEDICINE
Payer: MEDICARE

## 2021-01-13 DIAGNOSIS — E11.8 DIABETES MELLITUS TYPE 2 WITH COMPLICATIONS: Primary | ICD-10-CM

## 2021-01-13 DIAGNOSIS — E11.9 DIABETES MELLITUS WITHOUT COMPLICATION: ICD-10-CM

## 2021-01-13 LAB
ESTIMATED AVG GLUCOSE: 157 MG/DL (ref 68–131)
HBA1C MFR BLD HPLC: 7.1 % (ref 4–5.6)

## 2021-01-13 PROCEDURE — 83036 HEMOGLOBIN GLYCOSYLATED A1C: CPT

## 2021-01-13 PROCEDURE — 36415 COLL VENOUS BLD VENIPUNCTURE: CPT | Mod: PO

## 2021-01-14 ENCOUNTER — TELEPHONE (OUTPATIENT)
Dept: FAMILY MEDICINE | Facility: CLINIC | Age: 71
End: 2021-01-14

## 2021-01-14 ENCOUNTER — PATIENT MESSAGE (OUTPATIENT)
Dept: FAMILY MEDICINE | Facility: CLINIC | Age: 71
End: 2021-01-14

## 2021-01-14 RX ORDER — INSULIN PUMP SYRINGE, 3 ML
EACH MISCELLANEOUS
Qty: 1 EACH | Refills: 0 | Status: SHIPPED | OUTPATIENT
Start: 2021-01-14 | End: 2023-09-24

## 2021-01-14 RX ORDER — LANCETS
EACH MISCELLANEOUS
Qty: 100 EACH | Refills: 5 | Status: SHIPPED | OUTPATIENT
Start: 2021-01-14 | End: 2023-04-26 | Stop reason: SDUPTHER

## 2021-01-14 RX ORDER — INSULIN PUMP SYRINGE, 3 ML
EACH MISCELLANEOUS
Qty: 1 EACH | Refills: 0 | Status: SHIPPED | OUTPATIENT
Start: 2021-01-14 | End: 2021-01-14 | Stop reason: SDUPTHER

## 2021-01-19 ENCOUNTER — PATIENT MESSAGE (OUTPATIENT)
Dept: FAMILY MEDICINE | Facility: CLINIC | Age: 71
End: 2021-01-19

## 2021-01-23 ENCOUNTER — PATIENT MESSAGE (OUTPATIENT)
Dept: FAMILY MEDICINE | Facility: CLINIC | Age: 71
End: 2021-01-23

## 2021-01-23 RX ORDER — SULFAMETHOXAZOLE AND TRIMETHOPRIM 800; 160 MG/1; MG/1
1 TABLET ORAL 2 TIMES DAILY
Qty: 6 TABLET | Refills: 0 | Status: SHIPPED | OUTPATIENT
Start: 2021-01-23 | End: 2021-01-26

## 2021-01-28 ENCOUNTER — PATIENT MESSAGE (OUTPATIENT)
Dept: FAMILY MEDICINE | Facility: CLINIC | Age: 71
End: 2021-01-28

## 2021-02-04 ENCOUNTER — OFFICE VISIT (OUTPATIENT)
Dept: FAMILY MEDICINE | Facility: CLINIC | Age: 71
End: 2021-02-04
Payer: MEDICARE

## 2021-02-04 VITALS
TEMPERATURE: 98 F | SYSTOLIC BLOOD PRESSURE: 176 MMHG | BODY MASS INDEX: 23.19 KG/M2 | DIASTOLIC BLOOD PRESSURE: 90 MMHG | OXYGEN SATURATION: 99 % | WEIGHT: 144.31 LBS | HEIGHT: 66 IN | RESPIRATION RATE: 16 BRPM | HEART RATE: 84 BPM

## 2021-02-04 DIAGNOSIS — R03.0 BLOOD PRESSURE ELEVATED WITHOUT HISTORY OF HTN: ICD-10-CM

## 2021-02-04 DIAGNOSIS — E11.69 HYPERLIPIDEMIA ASSOCIATED WITH TYPE 2 DIABETES MELLITUS: ICD-10-CM

## 2021-02-04 DIAGNOSIS — E11.8 DIABETES MELLITUS TYPE 2 WITH COMPLICATIONS: Primary | ICD-10-CM

## 2021-02-04 DIAGNOSIS — E78.5 HYPERLIPIDEMIA ASSOCIATED WITH TYPE 2 DIABETES MELLITUS: ICD-10-CM

## 2021-02-04 DIAGNOSIS — Z23 NEED FOR SHINGLES VACCINE: ICD-10-CM

## 2021-02-04 PROCEDURE — 1126F AMNT PAIN NOTED NONE PRSNT: CPT | Mod: S$GLB,,, | Performed by: FAMILY MEDICINE

## 2021-02-04 PROCEDURE — 1101F PT FALLS ASSESS-DOCD LE1/YR: CPT | Mod: CPTII,S$GLB,, | Performed by: FAMILY MEDICINE

## 2021-02-04 PROCEDURE — 1126F PR PAIN SEVERITY QUANTIFIED, NO PAIN PRESENT: ICD-10-PCS | Mod: S$GLB,,, | Performed by: FAMILY MEDICINE

## 2021-02-04 PROCEDURE — 1157F ADVNC CARE PLAN IN RCRD: CPT | Mod: S$GLB,,, | Performed by: FAMILY MEDICINE

## 2021-02-04 PROCEDURE — 3080F DIAST BP >= 90 MM HG: CPT | Mod: CPTII,S$GLB,, | Performed by: FAMILY MEDICINE

## 2021-02-04 PROCEDURE — 1159F PR MEDICATION LIST DOCUMENTED IN MEDICAL RECORD: ICD-10-PCS | Mod: S$GLB,,, | Performed by: FAMILY MEDICINE

## 2021-02-04 PROCEDURE — 1159F MED LIST DOCD IN RCRD: CPT | Mod: S$GLB,,, | Performed by: FAMILY MEDICINE

## 2021-02-04 PROCEDURE — 99214 PR OFFICE/OUTPT VISIT, EST, LEVL IV, 30-39 MIN: ICD-10-PCS | Mod: S$GLB,,, | Performed by: FAMILY MEDICINE

## 2021-02-04 PROCEDURE — 99214 OFFICE O/P EST MOD 30 MIN: CPT | Mod: S$GLB,,, | Performed by: FAMILY MEDICINE

## 2021-02-04 PROCEDURE — 3288F PR FALLS RISK ASSESSMENT DOCUMENTED: ICD-10-PCS | Mod: CPTII,S$GLB,, | Performed by: FAMILY MEDICINE

## 2021-02-04 PROCEDURE — 3080F PR MOST RECENT DIASTOLIC BLOOD PRESSURE >= 90 MM HG: ICD-10-PCS | Mod: CPTII,S$GLB,, | Performed by: FAMILY MEDICINE

## 2021-02-04 PROCEDURE — 3051F PR MOST RECENT HEMOGLOBIN A1C LEVEL 7.0 - < 8.0%: ICD-10-PCS | Mod: CPTII,S$GLB,, | Performed by: FAMILY MEDICINE

## 2021-02-04 PROCEDURE — 3051F HG A1C>EQUAL 7.0%<8.0%: CPT | Mod: CPTII,S$GLB,, | Performed by: FAMILY MEDICINE

## 2021-02-04 PROCEDURE — 99499 UNLISTED E&M SERVICE: CPT | Mod: S$GLB,,, | Performed by: FAMILY MEDICINE

## 2021-02-04 PROCEDURE — 3288F FALL RISK ASSESSMENT DOCD: CPT | Mod: CPTII,S$GLB,, | Performed by: FAMILY MEDICINE

## 2021-02-04 PROCEDURE — 1157F PR ADVANCE CARE PLAN OR EQUIV PRESENT IN MEDICAL RECORD: ICD-10-PCS | Mod: S$GLB,,, | Performed by: FAMILY MEDICINE

## 2021-02-04 PROCEDURE — 99499 RISK ADDL DX/OHS AUDIT: ICD-10-PCS | Mod: S$GLB,,, | Performed by: FAMILY MEDICINE

## 2021-02-04 PROCEDURE — 3008F PR BODY MASS INDEX (BMI) DOCUMENTED: ICD-10-PCS | Mod: CPTII,S$GLB,, | Performed by: FAMILY MEDICINE

## 2021-02-04 PROCEDURE — 1101F PR PT FALLS ASSESS DOC 0-1 FALLS W/OUT INJ PAST YR: ICD-10-PCS | Mod: CPTII,S$GLB,, | Performed by: FAMILY MEDICINE

## 2021-02-04 PROCEDURE — 3008F BODY MASS INDEX DOCD: CPT | Mod: CPTII,S$GLB,, | Performed by: FAMILY MEDICINE

## 2021-02-04 PROCEDURE — 3077F PR MOST RECENT SYSTOLIC BLOOD PRESSURE >= 140 MM HG: ICD-10-PCS | Mod: CPTII,S$GLB,, | Performed by: FAMILY MEDICINE

## 2021-02-04 PROCEDURE — 3077F SYST BP >= 140 MM HG: CPT | Mod: CPTII,S$GLB,, | Performed by: FAMILY MEDICINE

## 2021-02-04 RX ORDER — TAMSULOSIN HYDROCHLORIDE 0.4 MG/1
1 CAPSULE ORAL NIGHTLY
COMMUNITY
Start: 2021-02-02 | End: 2022-09-08

## 2021-02-04 RX ORDER — NITROFURANTOIN 25; 75 MG/1; MG/1
1 CAPSULE ORAL 2 TIMES DAILY PRN
COMMUNITY
Start: 2021-02-02 | End: 2023-06-24 | Stop reason: ALTCHOICE

## 2021-02-04 RX ORDER — ASCORBATE CALCIUM 500 MG
1 TABLET ORAL DAILY
COMMUNITY

## 2021-02-04 RX ORDER — ZOSTER VACCINE RECOMBINANT, ADJUVANTED 50 MCG/0.5
0.5 KIT INTRAMUSCULAR ONCE
Qty: 1 EACH | Refills: 1 | Status: SHIPPED | OUTPATIENT
Start: 2021-02-04 | End: 2021-02-04

## 2021-02-22 ENCOUNTER — PATIENT MESSAGE (OUTPATIENT)
Dept: FAMILY MEDICINE | Facility: CLINIC | Age: 71
End: 2021-02-22

## 2021-03-03 ENCOUNTER — PATIENT MESSAGE (OUTPATIENT)
Dept: FAMILY MEDICINE | Facility: CLINIC | Age: 71
End: 2021-03-03

## 2021-03-03 DIAGNOSIS — Z23 IMMUNIZATION DUE: Primary | ICD-10-CM

## 2021-03-04 ENCOUNTER — TELEPHONE (OUTPATIENT)
Dept: FAMILY MEDICINE | Facility: CLINIC | Age: 71
End: 2021-03-04

## 2021-03-04 ENCOUNTER — CLINICAL SUPPORT (OUTPATIENT)
Dept: FAMILY MEDICINE | Facility: CLINIC | Age: 71
End: 2021-03-04
Payer: MEDICARE

## 2021-03-05 ENCOUNTER — IMMUNIZATION (OUTPATIENT)
Dept: FAMILY MEDICINE | Facility: CLINIC | Age: 71
End: 2021-03-05
Payer: MEDICARE

## 2021-03-05 DIAGNOSIS — Z23 NEED FOR VACCINATION: Primary | ICD-10-CM

## 2021-03-05 PROCEDURE — 91300 COVID-19, MRNA, LNP-S, PF, 30 MCG/0.3 ML DOSE VACCINE: CPT | Mod: ,,, | Performed by: FAMILY MEDICINE

## 2021-03-05 PROCEDURE — 91300 COVID-19, MRNA, LNP-S, PF, 30 MCG/0.3 ML DOSE VACCINE: ICD-10-PCS | Mod: ,,, | Performed by: FAMILY MEDICINE

## 2021-03-05 PROCEDURE — 0001A COVID-19, MRNA, LNP-S, PF, 30 MCG/0.3 ML DOSE VACCINE: CPT | Mod: CV19,,, | Performed by: FAMILY MEDICINE

## 2021-03-05 PROCEDURE — 0001A COVID-19, MRNA, LNP-S, PF, 30 MCG/0.3 ML DOSE VACCINE: ICD-10-PCS | Mod: CV19,,, | Performed by: FAMILY MEDICINE

## 2021-03-26 ENCOUNTER — IMMUNIZATION (OUTPATIENT)
Dept: FAMILY MEDICINE | Facility: CLINIC | Age: 71
End: 2021-03-26
Payer: MEDICARE

## 2021-03-26 DIAGNOSIS — Z23 NEED FOR VACCINATION: Primary | ICD-10-CM

## 2021-03-26 PROCEDURE — 91300 COVID-19, MRNA, LNP-S, PF, 30 MCG/0.3 ML DOSE VACCINE: ICD-10-PCS | Mod: ,,, | Performed by: FAMILY MEDICINE

## 2021-03-26 PROCEDURE — 0002A COVID-19, MRNA, LNP-S, PF, 30 MCG/0.3 ML DOSE VACCINE: CPT | Mod: CV19,,, | Performed by: FAMILY MEDICINE

## 2021-03-26 PROCEDURE — 91300 COVID-19, MRNA, LNP-S, PF, 30 MCG/0.3 ML DOSE VACCINE: CPT | Mod: ,,, | Performed by: FAMILY MEDICINE

## 2021-03-26 PROCEDURE — 0002A COVID-19, MRNA, LNP-S, PF, 30 MCG/0.3 ML DOSE VACCINE: ICD-10-PCS | Mod: CV19,,, | Performed by: FAMILY MEDICINE

## 2021-04-09 ENCOUNTER — CLINICAL SUPPORT (OUTPATIENT)
Dept: FAMILY MEDICINE | Facility: CLINIC | Age: 71
End: 2021-04-09
Payer: MEDICARE

## 2021-04-09 DIAGNOSIS — Z23 IMMUNIZATION DUE: Primary | ICD-10-CM

## 2021-04-09 PROCEDURE — 90670 PNEUMOCOCCAL CONJUGATE VACCINE 13-VALENT LESS THAN 5YO & GREATER THAN: ICD-10-PCS | Mod: S$GLB,,, | Performed by: FAMILY MEDICINE

## 2021-04-09 PROCEDURE — G0009 PNEUMOCOCCAL CONJUGATE VACCINE 13-VALENT LESS THAN 5YO & GREATER THAN: ICD-10-PCS | Mod: S$GLB,,, | Performed by: FAMILY MEDICINE

## 2021-04-09 PROCEDURE — 90670 PCV13 VACCINE IM: CPT | Mod: S$GLB,,, | Performed by: FAMILY MEDICINE

## 2021-04-09 PROCEDURE — G0009 ADMIN PNEUMOCOCCAL VACCINE: HCPCS | Mod: S$GLB,,, | Performed by: FAMILY MEDICINE

## 2021-04-23 ENCOUNTER — PATIENT MESSAGE (OUTPATIENT)
Dept: FAMILY MEDICINE | Facility: CLINIC | Age: 71
End: 2021-04-23

## 2021-04-23 ENCOUNTER — CLINICAL SUPPORT (OUTPATIENT)
Dept: FAMILY MEDICINE | Facility: CLINIC | Age: 71
End: 2021-04-23
Payer: MEDICARE

## 2021-04-23 DIAGNOSIS — R39.9 UTI SYMPTOMS: Primary | ICD-10-CM

## 2021-04-23 PROCEDURE — 81003 URINALYSIS AUTO W/O SCOPE: CPT | Performed by: FAMILY MEDICINE

## 2021-04-23 RX ORDER — CIPROFLOXACIN 250 MG/1
250 TABLET, FILM COATED ORAL 2 TIMES DAILY
Qty: 6 TABLET | Refills: 0 | Status: SHIPPED | OUTPATIENT
Start: 2021-04-23 | End: 2021-04-26

## 2021-04-26 LAB
BILIRUB UR QL STRIP: NEGATIVE
CLARITY UR REFRACT.AUTO: CLEAR
COLOR UR AUTO: YELLOW
GLUCOSE UR QL STRIP: NEGATIVE
HGB UR QL STRIP: NEGATIVE
KETONES UR QL STRIP: NEGATIVE
LEUKOCYTE ESTERASE UR QL STRIP: NEGATIVE
NITRITE UR QL STRIP: NEGATIVE
PH UR STRIP: 6 [PH] (ref 5–8)
PROT UR QL STRIP: NEGATIVE
SP GR UR STRIP: 1.01 (ref 1–1.03)
URN SPEC COLLECT METH UR: NORMAL

## 2021-04-29 ENCOUNTER — PATIENT MESSAGE (OUTPATIENT)
Dept: FAMILY MEDICINE | Facility: CLINIC | Age: 71
End: 2021-04-29

## 2021-05-02 RX ORDER — ESTRADIOL 0.1 MG/G
1 CREAM VAGINAL
Qty: 42.5 G | Refills: 2 | Status: SHIPPED | OUTPATIENT
Start: 2021-05-03 | End: 2021-08-03

## 2021-07-07 ENCOUNTER — PATIENT MESSAGE (OUTPATIENT)
Dept: ADMINISTRATIVE | Facility: HOSPITAL | Age: 71
End: 2021-07-07

## 2021-07-07 ENCOUNTER — PATIENT OUTREACH (OUTPATIENT)
Dept: ADMINISTRATIVE | Facility: HOSPITAL | Age: 71
End: 2021-07-07

## 2021-07-29 ENCOUNTER — TELEPHONE (OUTPATIENT)
Dept: FAMILY MEDICINE | Facility: CLINIC | Age: 71
End: 2021-07-29

## 2021-07-29 ENCOUNTER — PATIENT MESSAGE (OUTPATIENT)
Dept: FAMILY MEDICINE | Facility: CLINIC | Age: 71
End: 2021-07-29

## 2021-08-01 ENCOUNTER — PATIENT MESSAGE (OUTPATIENT)
Dept: FAMILY MEDICINE | Facility: CLINIC | Age: 71
End: 2021-08-01

## 2021-08-02 RX ORDER — OMEPRAZOLE 40 MG/1
40 CAPSULE, DELAYED RELEASE ORAL DAILY
Qty: 90 CAPSULE | Refills: 3 | Status: SHIPPED | OUTPATIENT
Start: 2021-08-02 | End: 2022-05-10

## 2021-08-03 RX ORDER — ESTRADIOL 10 UG/1
1 INSERT VAGINAL
Qty: 24 TABLET | Refills: 2 | Status: SHIPPED | OUTPATIENT
Start: 2021-08-05 | End: 2022-08-03

## 2021-08-04 ENCOUNTER — PATIENT MESSAGE (OUTPATIENT)
Dept: ADMINISTRATIVE | Facility: HOSPITAL | Age: 71
End: 2021-08-04

## 2021-08-06 ENCOUNTER — LAB VISIT (OUTPATIENT)
Dept: LAB | Facility: HOSPITAL | Age: 71
End: 2021-08-06
Attending: FAMILY MEDICINE
Payer: MEDICARE

## 2021-08-06 DIAGNOSIS — E11.8 DIABETES MELLITUS TYPE 2 WITH COMPLICATIONS: ICD-10-CM

## 2021-08-06 LAB
ALBUMIN SERPL BCP-MCNC: 4.1 G/DL (ref 3.5–5.2)
ALP SERPL-CCNC: 55 U/L (ref 55–135)
ALT SERPL W/O P-5'-P-CCNC: 22 U/L (ref 10–44)
ANION GAP SERPL CALC-SCNC: 8 MMOL/L (ref 8–16)
AST SERPL-CCNC: 19 U/L (ref 10–40)
BASOPHILS # BLD AUTO: 0.07 K/UL (ref 0–0.2)
BASOPHILS NFR BLD: 0.8 % (ref 0–1.9)
BILIRUB SERPL-MCNC: 0.4 MG/DL (ref 0.1–1)
BUN SERPL-MCNC: 13 MG/DL (ref 8–23)
CALCIUM SERPL-MCNC: 10.1 MG/DL (ref 8.7–10.5)
CHLORIDE SERPL-SCNC: 103 MMOL/L (ref 95–110)
CHOLEST SERPL-MCNC: 153 MG/DL (ref 120–199)
CHOLEST/HDLC SERPL: 3.1 {RATIO} (ref 2–5)
CO2 SERPL-SCNC: 28 MMOL/L (ref 23–29)
CREAT SERPL-MCNC: 0.8 MG/DL (ref 0.5–1.4)
DIFFERENTIAL METHOD: ABNORMAL
EOSINOPHIL # BLD AUTO: 0.2 K/UL (ref 0–0.5)
EOSINOPHIL NFR BLD: 2.4 % (ref 0–8)
ERYTHROCYTE [DISTWIDTH] IN BLOOD BY AUTOMATED COUNT: 15.3 % (ref 11.5–14.5)
EST. GFR  (AFRICAN AMERICAN): >60 ML/MIN/1.73 M^2
EST. GFR  (NON AFRICAN AMERICAN): >60 ML/MIN/1.73 M^2
ESTIMATED AVG GLUCOSE: 128 MG/DL (ref 68–131)
GLUCOSE SERPL-MCNC: 118 MG/DL (ref 70–110)
HBA1C MFR BLD: 6.1 % (ref 4–5.6)
HCT VFR BLD AUTO: 40.1 % (ref 37–48.5)
HDLC SERPL-MCNC: 50 MG/DL (ref 40–75)
HDLC SERPL: 32.7 % (ref 20–50)
HGB BLD-MCNC: 12.7 G/DL (ref 12–16)
IMM GRANULOCYTES # BLD AUTO: 0.02 K/UL (ref 0–0.04)
IMM GRANULOCYTES NFR BLD AUTO: 0.2 % (ref 0–0.5)
LDLC SERPL CALC-MCNC: 77.6 MG/DL (ref 63–159)
LYMPHOCYTES # BLD AUTO: 3.3 K/UL (ref 1–4.8)
LYMPHOCYTES NFR BLD: 39.4 % (ref 18–48)
MCH RBC QN AUTO: 26.7 PG (ref 27–31)
MCHC RBC AUTO-ENTMCNC: 31.7 G/DL (ref 32–36)
MCV RBC AUTO: 84 FL (ref 82–98)
MONOCYTES # BLD AUTO: 0.7 K/UL (ref 0.3–1)
MONOCYTES NFR BLD: 7.8 % (ref 4–15)
NEUTROPHILS # BLD AUTO: 4.1 K/UL (ref 1.8–7.7)
NEUTROPHILS NFR BLD: 49.4 % (ref 38–73)
NONHDLC SERPL-MCNC: 103 MG/DL
NRBC BLD-RTO: 0 /100 WBC
PLATELET # BLD AUTO: 318 K/UL (ref 150–450)
PMV BLD AUTO: 10.8 FL (ref 9.2–12.9)
POTASSIUM SERPL-SCNC: 4.5 MMOL/L (ref 3.5–5.1)
PROT SERPL-MCNC: 7.3 G/DL (ref 6–8.4)
RBC # BLD AUTO: 4.76 M/UL (ref 4–5.4)
SODIUM SERPL-SCNC: 139 MMOL/L (ref 136–145)
TRIGL SERPL-MCNC: 127 MG/DL (ref 30–150)
TSH SERPL DL<=0.005 MIU/L-ACNC: 1.94 UIU/ML (ref 0.4–4)
WBC # BLD AUTO: 8.37 K/UL (ref 3.9–12.7)

## 2021-08-06 PROCEDURE — 80053 COMPREHEN METABOLIC PANEL: CPT | Performed by: FAMILY MEDICINE

## 2021-08-06 PROCEDURE — 36415 COLL VENOUS BLD VENIPUNCTURE: CPT | Mod: PO | Performed by: FAMILY MEDICINE

## 2021-08-06 PROCEDURE — 85025 COMPLETE CBC W/AUTO DIFF WBC: CPT | Performed by: FAMILY MEDICINE

## 2021-08-06 PROCEDURE — 83036 HEMOGLOBIN GLYCOSYLATED A1C: CPT | Performed by: FAMILY MEDICINE

## 2021-08-06 PROCEDURE — 84443 ASSAY THYROID STIM HORMONE: CPT | Performed by: FAMILY MEDICINE

## 2021-08-06 PROCEDURE — 80061 LIPID PANEL: CPT | Performed by: FAMILY MEDICINE

## 2021-08-07 ENCOUNTER — PATIENT MESSAGE (OUTPATIENT)
Dept: FAMILY MEDICINE | Facility: CLINIC | Age: 71
End: 2021-08-07

## 2021-08-19 ENCOUNTER — OFFICE VISIT (OUTPATIENT)
Dept: FAMILY MEDICINE | Facility: CLINIC | Age: 71
End: 2021-08-19
Payer: MEDICARE

## 2021-08-19 VITALS
HEART RATE: 78 BPM | TEMPERATURE: 98 F | WEIGHT: 136 LBS | DIASTOLIC BLOOD PRESSURE: 74 MMHG | RESPIRATION RATE: 18 BRPM | HEIGHT: 66 IN | OXYGEN SATURATION: 99 % | SYSTOLIC BLOOD PRESSURE: 122 MMHG | BODY MASS INDEX: 21.86 KG/M2

## 2021-08-19 DIAGNOSIS — E78.5 HYPERLIPIDEMIA ASSOCIATED WITH TYPE 2 DIABETES MELLITUS: ICD-10-CM

## 2021-08-19 DIAGNOSIS — E11.69 HYPERLIPIDEMIA ASSOCIATED WITH TYPE 2 DIABETES MELLITUS: ICD-10-CM

## 2021-08-19 DIAGNOSIS — E11.8 DIABETES MELLITUS TYPE 2 WITH COMPLICATIONS: Primary | ICD-10-CM

## 2021-08-19 DIAGNOSIS — I15.2 HYPERTENSION ASSOCIATED WITH DIABETES: ICD-10-CM

## 2021-08-19 DIAGNOSIS — E11.59 HYPERTENSION ASSOCIATED WITH DIABETES: ICD-10-CM

## 2021-08-19 PROCEDURE — 3288F PR FALLS RISK ASSESSMENT DOCUMENTED: ICD-10-PCS | Mod: CPTII,S$GLB,, | Performed by: FAMILY MEDICINE

## 2021-08-19 PROCEDURE — 3074F PR MOST RECENT SYSTOLIC BLOOD PRESSURE < 130 MM HG: ICD-10-PCS | Mod: CPTII,S$GLB,, | Performed by: FAMILY MEDICINE

## 2021-08-19 PROCEDURE — 3078F DIAST BP <80 MM HG: CPT | Mod: CPTII,S$GLB,, | Performed by: FAMILY MEDICINE

## 2021-08-19 PROCEDURE — 1159F MED LIST DOCD IN RCRD: CPT | Mod: CPTII,S$GLB,, | Performed by: FAMILY MEDICINE

## 2021-08-19 PROCEDURE — 1159F PR MEDICATION LIST DOCUMENTED IN MEDICAL RECORD: ICD-10-PCS | Mod: CPTII,S$GLB,, | Performed by: FAMILY MEDICINE

## 2021-08-19 PROCEDURE — 3078F PR MOST RECENT DIASTOLIC BLOOD PRESSURE < 80 MM HG: ICD-10-PCS | Mod: CPTII,S$GLB,, | Performed by: FAMILY MEDICINE

## 2021-08-19 PROCEDURE — 1101F PT FALLS ASSESS-DOCD LE1/YR: CPT | Mod: CPTII,S$GLB,, | Performed by: FAMILY MEDICINE

## 2021-08-19 PROCEDURE — 3288F FALL RISK ASSESSMENT DOCD: CPT | Mod: CPTII,S$GLB,, | Performed by: FAMILY MEDICINE

## 2021-08-19 PROCEDURE — 1160F RVW MEDS BY RX/DR IN RCRD: CPT | Mod: CPTII,S$GLB,, | Performed by: FAMILY MEDICINE

## 2021-08-19 PROCEDURE — 1126F PR PAIN SEVERITY QUANTIFIED, NO PAIN PRESENT: ICD-10-PCS | Mod: CPTII,S$GLB,, | Performed by: FAMILY MEDICINE

## 2021-08-19 PROCEDURE — 99499 RISK ADDL DX/OHS AUDIT: ICD-10-PCS | Mod: S$GLB,,, | Performed by: FAMILY MEDICINE

## 2021-08-19 PROCEDURE — 3044F PR MOST RECENT HEMOGLOBIN A1C LEVEL <7.0%: ICD-10-PCS | Mod: CPTII,S$GLB,, | Performed by: FAMILY MEDICINE

## 2021-08-19 PROCEDURE — 99214 OFFICE O/P EST MOD 30 MIN: CPT | Mod: S$GLB,,, | Performed by: FAMILY MEDICINE

## 2021-08-19 PROCEDURE — 1101F PR PT FALLS ASSESS DOC 0-1 FALLS W/OUT INJ PAST YR: ICD-10-PCS | Mod: CPTII,S$GLB,, | Performed by: FAMILY MEDICINE

## 2021-08-19 PROCEDURE — 3008F BODY MASS INDEX DOCD: CPT | Mod: CPTII,S$GLB,, | Performed by: FAMILY MEDICINE

## 2021-08-19 PROCEDURE — 1160F PR REVIEW ALL MEDS BY PRESCRIBER/CLIN PHARMACIST DOCUMENTED: ICD-10-PCS | Mod: CPTII,S$GLB,, | Performed by: FAMILY MEDICINE

## 2021-08-19 PROCEDURE — 99499 UNLISTED E&M SERVICE: CPT | Mod: S$GLB,,, | Performed by: FAMILY MEDICINE

## 2021-08-19 PROCEDURE — 1157F PR ADVANCE CARE PLAN OR EQUIV PRESENT IN MEDICAL RECORD: ICD-10-PCS | Mod: CPTII,S$GLB,, | Performed by: FAMILY MEDICINE

## 2021-08-19 PROCEDURE — 3044F HG A1C LEVEL LT 7.0%: CPT | Mod: CPTII,S$GLB,, | Performed by: FAMILY MEDICINE

## 2021-08-19 PROCEDURE — 1157F ADVNC CARE PLAN IN RCRD: CPT | Mod: CPTII,S$GLB,, | Performed by: FAMILY MEDICINE

## 2021-08-19 PROCEDURE — 99214 PR OFFICE/OUTPT VISIT, EST, LEVL IV, 30-39 MIN: ICD-10-PCS | Mod: S$GLB,,, | Performed by: FAMILY MEDICINE

## 2021-08-19 PROCEDURE — 1126F AMNT PAIN NOTED NONE PRSNT: CPT | Mod: CPTII,S$GLB,, | Performed by: FAMILY MEDICINE

## 2021-08-19 PROCEDURE — 3074F SYST BP LT 130 MM HG: CPT | Mod: CPTII,S$GLB,, | Performed by: FAMILY MEDICINE

## 2021-08-19 PROCEDURE — 3008F PR BODY MASS INDEX (BMI) DOCUMENTED: ICD-10-PCS | Mod: CPTII,S$GLB,, | Performed by: FAMILY MEDICINE

## 2021-08-19 RX ORDER — LISINOPRIL 5 MG/1
5 TABLET ORAL DAILY
COMMUNITY
End: 2021-10-27

## 2021-09-13 ENCOUNTER — PATIENT OUTREACH (OUTPATIENT)
Dept: ADMINISTRATIVE | Facility: OTHER | Age: 71
End: 2021-09-13

## 2021-09-13 DIAGNOSIS — E11.9 DIABETES MELLITUS WITHOUT COMPLICATION: Primary | ICD-10-CM

## 2021-09-14 ENCOUNTER — OFFICE VISIT (OUTPATIENT)
Dept: UROGYNECOLOGY | Facility: CLINIC | Age: 71
End: 2021-09-14
Payer: MEDICARE

## 2021-09-14 VITALS
SYSTOLIC BLOOD PRESSURE: 139 MMHG | WEIGHT: 133.63 LBS | HEIGHT: 65 IN | HEART RATE: 76 BPM | BODY MASS INDEX: 22.26 KG/M2 | DIASTOLIC BLOOD PRESSURE: 79 MMHG

## 2021-09-14 DIAGNOSIS — N95.2 ATROPHIC VAGINITIS: ICD-10-CM

## 2021-09-14 DIAGNOSIS — N34.3 URETHRAL SYNDROME: ICD-10-CM

## 2021-09-14 DIAGNOSIS — N81.6 CYSTOCELE WITH RECTOCELE: ICD-10-CM

## 2021-09-14 DIAGNOSIS — N39.0 RECURRENT UTI: ICD-10-CM

## 2021-09-14 DIAGNOSIS — N81.10 CYSTOCELE WITH RECTOCELE: ICD-10-CM

## 2021-09-14 DIAGNOSIS — R35.0 URINARY FREQUENCY: Primary | ICD-10-CM

## 2021-09-14 LAB
BILIRUB SERPL-MCNC: NORMAL MG/DL
BLOOD URINE, POC: NORMAL
CLARITY, POC UA: CLEAR
COLOR, POC UA: YELLOW
GLUCOSE UR QL STRIP: NORMAL
KETONES UR QL STRIP: NORMAL
LEUKOCYTE ESTERASE URINE, POC: NORMAL
NITRITE, POC UA: NORMAL
PH, POC UA: 5
PROTEIN, POC: NORMAL
SPECIFIC GRAVITY, POC UA: 1
UROBILINOGEN, POC UA: NORMAL

## 2021-09-14 PROCEDURE — 3061F PR NEG MICROALBUMINURIA RESULT DOCUMENTED/REVIEW: ICD-10-PCS | Mod: CPTII,S$GLB,, | Performed by: OBSTETRICS & GYNECOLOGY

## 2021-09-14 PROCEDURE — 1157F ADVNC CARE PLAN IN RCRD: CPT | Mod: CPTII,S$GLB,, | Performed by: OBSTETRICS & GYNECOLOGY

## 2021-09-14 PROCEDURE — 1126F PR PAIN SEVERITY QUANTIFIED, NO PAIN PRESENT: ICD-10-PCS | Mod: CPTII,S$GLB,, | Performed by: OBSTETRICS & GYNECOLOGY

## 2021-09-14 PROCEDURE — 3044F HG A1C LEVEL LT 7.0%: CPT | Mod: CPTII,S$GLB,, | Performed by: OBSTETRICS & GYNECOLOGY

## 2021-09-14 PROCEDURE — 3044F PR MOST RECENT HEMOGLOBIN A1C LEVEL <7.0%: ICD-10-PCS | Mod: CPTII,S$GLB,, | Performed by: OBSTETRICS & GYNECOLOGY

## 2021-09-14 PROCEDURE — 1157F PR ADVANCE CARE PLAN OR EQUIV PRESENT IN MEDICAL RECORD: ICD-10-PCS | Mod: CPTII,S$GLB,, | Performed by: OBSTETRICS & GYNECOLOGY

## 2021-09-14 PROCEDURE — 81002 URINALYSIS NONAUTO W/O SCOPE: CPT | Mod: QW,S$GLB,, | Performed by: OBSTETRICS & GYNECOLOGY

## 2021-09-14 PROCEDURE — 3008F BODY MASS INDEX DOCD: CPT | Mod: CPTII,S$GLB,, | Performed by: OBSTETRICS & GYNECOLOGY

## 2021-09-14 PROCEDURE — 1159F PR MEDICATION LIST DOCUMENTED IN MEDICAL RECORD: ICD-10-PCS | Mod: CPTII,S$GLB,, | Performed by: OBSTETRICS & GYNECOLOGY

## 2021-09-14 PROCEDURE — 3078F PR MOST RECENT DIASTOLIC BLOOD PRESSURE < 80 MM HG: ICD-10-PCS | Mod: CPTII,S$GLB,, | Performed by: OBSTETRICS & GYNECOLOGY

## 2021-09-14 PROCEDURE — 4010F ACE/ARB THERAPY RXD/TAKEN: CPT | Mod: CPTII,S$GLB,, | Performed by: OBSTETRICS & GYNECOLOGY

## 2021-09-14 PROCEDURE — 3288F PR FALLS RISK ASSESSMENT DOCUMENTED: ICD-10-PCS | Mod: CPTII,S$GLB,, | Performed by: OBSTETRICS & GYNECOLOGY

## 2021-09-14 PROCEDURE — 99204 OFFICE O/P NEW MOD 45 MIN: CPT | Mod: 25,S$GLB,, | Performed by: OBSTETRICS & GYNECOLOGY

## 2021-09-14 PROCEDURE — 1101F PT FALLS ASSESS-DOCD LE1/YR: CPT | Mod: CPTII,S$GLB,, | Performed by: OBSTETRICS & GYNECOLOGY

## 2021-09-14 PROCEDURE — 1159F MED LIST DOCD IN RCRD: CPT | Mod: CPTII,S$GLB,, | Performed by: OBSTETRICS & GYNECOLOGY

## 2021-09-14 PROCEDURE — 3075F SYST BP GE 130 - 139MM HG: CPT | Mod: CPTII,S$GLB,, | Performed by: OBSTETRICS & GYNECOLOGY

## 2021-09-14 PROCEDURE — 3288F FALL RISK ASSESSMENT DOCD: CPT | Mod: CPTII,S$GLB,, | Performed by: OBSTETRICS & GYNECOLOGY

## 2021-09-14 PROCEDURE — 81002 POCT URINE DIPSTICK WITHOUT MICROSCOPE: ICD-10-PCS | Mod: QW,S$GLB,, | Performed by: OBSTETRICS & GYNECOLOGY

## 2021-09-14 PROCEDURE — 3075F PR MOST RECENT SYSTOLIC BLOOD PRESS GE 130-139MM HG: ICD-10-PCS | Mod: CPTII,S$GLB,, | Performed by: OBSTETRICS & GYNECOLOGY

## 2021-09-14 PROCEDURE — 99999 PR PBB SHADOW E&M-EST. PATIENT-LVL III: CPT | Mod: PBBFAC,,, | Performed by: OBSTETRICS & GYNECOLOGY

## 2021-09-14 PROCEDURE — 99999 PR PBB SHADOW E&M-EST. PATIENT-LVL III: ICD-10-PCS | Mod: PBBFAC,,, | Performed by: OBSTETRICS & GYNECOLOGY

## 2021-09-14 PROCEDURE — 3066F PR DOCUMENTATION OF TREATMENT FOR NEPHROPATHY: ICD-10-PCS | Mod: CPTII,S$GLB,, | Performed by: OBSTETRICS & GYNECOLOGY

## 2021-09-14 PROCEDURE — 3078F DIAST BP <80 MM HG: CPT | Mod: CPTII,S$GLB,, | Performed by: OBSTETRICS & GYNECOLOGY

## 2021-09-14 PROCEDURE — 3008F PR BODY MASS INDEX (BMI) DOCUMENTED: ICD-10-PCS | Mod: CPTII,S$GLB,, | Performed by: OBSTETRICS & GYNECOLOGY

## 2021-09-14 PROCEDURE — 1101F PR PT FALLS ASSESS DOC 0-1 FALLS W/OUT INJ PAST YR: ICD-10-PCS | Mod: CPTII,S$GLB,, | Performed by: OBSTETRICS & GYNECOLOGY

## 2021-09-14 PROCEDURE — 3061F NEG MICROALBUMINURIA REV: CPT | Mod: CPTII,S$GLB,, | Performed by: OBSTETRICS & GYNECOLOGY

## 2021-09-14 PROCEDURE — 99204 PR OFFICE/OUTPT VISIT, NEW, LEVL IV, 45-59 MIN: ICD-10-PCS | Mod: 25,S$GLB,, | Performed by: OBSTETRICS & GYNECOLOGY

## 2021-09-14 PROCEDURE — 1126F AMNT PAIN NOTED NONE PRSNT: CPT | Mod: CPTII,S$GLB,, | Performed by: OBSTETRICS & GYNECOLOGY

## 2021-09-14 PROCEDURE — 3066F NEPHROPATHY DOC TX: CPT | Mod: CPTII,S$GLB,, | Performed by: OBSTETRICS & GYNECOLOGY

## 2021-09-14 PROCEDURE — 4010F PR ACE/ARB THEARPY RXD/TAKEN: ICD-10-PCS | Mod: CPTII,S$GLB,, | Performed by: OBSTETRICS & GYNECOLOGY

## 2021-09-14 RX ORDER — SULFAMETHOXAZOLE AND TRIMETHOPRIM 800; 160 MG/1; MG/1
1 TABLET ORAL DAILY
Qty: 42 TABLET | Refills: 1 | Status: SHIPPED | OUTPATIENT
Start: 2021-09-14 | End: 2021-11-29 | Stop reason: SDUPTHER

## 2021-09-29 LAB
LEFT EYE DM RETINOPATHY: NEGATIVE
RIGHT EYE DM RETINOPATHY: NEGATIVE

## 2021-10-05 ENCOUNTER — TELEPHONE (OUTPATIENT)
Dept: FAMILY MEDICINE | Facility: CLINIC | Age: 71
End: 2021-10-05

## 2021-10-05 NOTE — TELEPHONE ENCOUNTER
----- Message from Jesenia Stanford sent at 10/5/2021 10:52 AM CDT -----  Pt dropped off her recent eye exam for Dr. Casillas to review and scan in her chart.  In box.

## 2021-10-11 ENCOUNTER — IMMUNIZATION (OUTPATIENT)
Dept: FAMILY MEDICINE | Facility: CLINIC | Age: 71
End: 2021-10-11
Payer: MEDICARE

## 2021-10-11 DIAGNOSIS — Z23 NEED FOR VACCINATION: Primary | ICD-10-CM

## 2021-10-11 PROCEDURE — 91300 COVID-19, MRNA, LNP-S, PF, 30 MCG/0.3 ML DOSE VACCINE: CPT | Mod: PBBFAC | Performed by: RADIOLOGY

## 2021-10-13 DIAGNOSIS — Z12.31 ENCOUNTER FOR SCREENING MAMMOGRAM FOR MALIGNANT NEOPLASM OF BREAST: Primary | ICD-10-CM

## 2021-10-18 ENCOUNTER — PATIENT MESSAGE (OUTPATIENT)
Dept: ADMINISTRATIVE | Facility: HOSPITAL | Age: 71
End: 2021-10-18
Payer: MEDICARE

## 2021-10-19 ENCOUNTER — PATIENT OUTREACH (OUTPATIENT)
Dept: ADMINISTRATIVE | Facility: HOSPITAL | Age: 71
End: 2021-10-19

## 2021-10-27 ENCOUNTER — HOSPITAL ENCOUNTER (OUTPATIENT)
Dept: RADIOLOGY | Facility: HOSPITAL | Age: 71
Discharge: HOME | End: 2021-10-27
Attending: FAMILY MEDICINE
Payer: MEDICARE

## 2021-10-27 DIAGNOSIS — Z12.31 ENCOUNTER FOR SCREENING MAMMOGRAM FOR MALIGNANT NEOPLASM OF BREAST: ICD-10-CM

## 2021-10-27 PROCEDURE — 77067 MAMMO DIGITAL SCREENING BILAT WITH TOMO: ICD-10-PCS | Mod: 26,,, | Performed by: RADIOLOGY

## 2021-10-27 PROCEDURE — 77067 SCR MAMMO BI INCL CAD: CPT | Mod: TC,PO

## 2021-10-27 PROCEDURE — 77063 BREAST TOMOSYNTHESIS BI: CPT | Mod: 26,,, | Performed by: RADIOLOGY

## 2021-10-27 PROCEDURE — 77063 MAMMO DIGITAL SCREENING BILAT WITH TOMO: ICD-10-PCS | Mod: 26,,, | Performed by: RADIOLOGY

## 2021-10-27 PROCEDURE — 77067 SCR MAMMO BI INCL CAD: CPT | Mod: 26,,, | Performed by: RADIOLOGY

## 2021-10-28 ENCOUNTER — PES CALL (OUTPATIENT)
Dept: ADMINISTRATIVE | Facility: CLINIC | Age: 71
End: 2021-10-28
Payer: MEDICARE

## 2021-10-29 ENCOUNTER — PATIENT MESSAGE (OUTPATIENT)
Dept: FAMILY MEDICINE | Facility: CLINIC | Age: 71
End: 2021-10-29
Payer: MEDICARE

## 2021-11-07 ENCOUNTER — PATIENT MESSAGE (OUTPATIENT)
Dept: FAMILY MEDICINE | Facility: CLINIC | Age: 71
End: 2021-11-07
Payer: MEDICARE

## 2021-11-08 ENCOUNTER — PATIENT OUTREACH (OUTPATIENT)
Dept: ADMINISTRATIVE | Facility: OTHER | Age: 71
End: 2021-11-08
Payer: MEDICARE

## 2021-11-09 ENCOUNTER — OFFICE VISIT (OUTPATIENT)
Dept: UROGYNECOLOGY | Facility: CLINIC | Age: 71
End: 2021-11-09
Payer: MEDICARE

## 2021-11-09 VITALS
SYSTOLIC BLOOD PRESSURE: 139 MMHG | WEIGHT: 133.63 LBS | HEART RATE: 78 BPM | BODY MASS INDEX: 22.26 KG/M2 | HEIGHT: 65 IN | DIASTOLIC BLOOD PRESSURE: 82 MMHG

## 2021-11-09 DIAGNOSIS — R35.0 URINARY FREQUENCY: Primary | ICD-10-CM

## 2021-11-09 DIAGNOSIS — N39.0 RECURRENT UTI: ICD-10-CM

## 2021-11-09 PROCEDURE — 3008F BODY MASS INDEX DOCD: CPT | Mod: CPTII,S$GLB,, | Performed by: OBSTETRICS & GYNECOLOGY

## 2021-11-09 PROCEDURE — 1159F MED LIST DOCD IN RCRD: CPT | Mod: CPTII,S$GLB,, | Performed by: OBSTETRICS & GYNECOLOGY

## 2021-11-09 PROCEDURE — 3044F HG A1C LEVEL LT 7.0%: CPT | Mod: CPTII,S$GLB,, | Performed by: OBSTETRICS & GYNECOLOGY

## 2021-11-09 PROCEDURE — 99213 PR OFFICE/OUTPT VISIT, EST, LEVL III, 20-29 MIN: ICD-10-PCS | Mod: 25,S$GLB,, | Performed by: OBSTETRICS & GYNECOLOGY

## 2021-11-09 PROCEDURE — 1160F PR REVIEW ALL MEDS BY PRESCRIBER/CLIN PHARMACIST DOCUMENTED: ICD-10-PCS | Mod: CPTII,S$GLB,, | Performed by: OBSTETRICS & GYNECOLOGY

## 2021-11-09 PROCEDURE — 3079F PR MOST RECENT DIASTOLIC BLOOD PRESSURE 80-89 MM HG: ICD-10-PCS | Mod: CPTII,S$GLB,, | Performed by: OBSTETRICS & GYNECOLOGY

## 2021-11-09 PROCEDURE — 1157F ADVNC CARE PLAN IN RCRD: CPT | Mod: CPTII,S$GLB,, | Performed by: OBSTETRICS & GYNECOLOGY

## 2021-11-09 PROCEDURE — 4010F ACE/ARB THERAPY RXD/TAKEN: CPT | Mod: CPTII,S$GLB,, | Performed by: OBSTETRICS & GYNECOLOGY

## 2021-11-09 PROCEDURE — 1126F AMNT PAIN NOTED NONE PRSNT: CPT | Mod: CPTII,S$GLB,, | Performed by: OBSTETRICS & GYNECOLOGY

## 2021-11-09 PROCEDURE — 3061F PR NEG MICROALBUMINURIA RESULT DOCUMENTED/REVIEW: ICD-10-PCS | Mod: CPTII,S$GLB,, | Performed by: OBSTETRICS & GYNECOLOGY

## 2021-11-09 PROCEDURE — 81002 POCT URINE DIPSTICK WITHOUT MICROSCOPE: ICD-10-PCS | Mod: S$GLB,,, | Performed by: OBSTETRICS & GYNECOLOGY

## 2021-11-09 PROCEDURE — 3075F PR MOST RECENT SYSTOLIC BLOOD PRESS GE 130-139MM HG: ICD-10-PCS | Mod: CPTII,S$GLB,, | Performed by: OBSTETRICS & GYNECOLOGY

## 2021-11-09 PROCEDURE — 3066F NEPHROPATHY DOC TX: CPT | Mod: CPTII,S$GLB,, | Performed by: OBSTETRICS & GYNECOLOGY

## 2021-11-09 PROCEDURE — 1157F PR ADVANCE CARE PLAN OR EQUIV PRESENT IN MEDICAL RECORD: ICD-10-PCS | Mod: CPTII,S$GLB,, | Performed by: OBSTETRICS & GYNECOLOGY

## 2021-11-09 PROCEDURE — 81002 URINALYSIS NONAUTO W/O SCOPE: CPT | Mod: S$GLB,,, | Performed by: OBSTETRICS & GYNECOLOGY

## 2021-11-09 PROCEDURE — 1160F RVW MEDS BY RX/DR IN RCRD: CPT | Mod: CPTII,S$GLB,, | Performed by: OBSTETRICS & GYNECOLOGY

## 2021-11-09 PROCEDURE — 99999 PR PBB SHADOW E&M-EST. PATIENT-LVL IV: ICD-10-PCS | Mod: PBBFAC,,, | Performed by: OBSTETRICS & GYNECOLOGY

## 2021-11-09 PROCEDURE — 4010F PR ACE/ARB THEARPY RXD/TAKEN: ICD-10-PCS | Mod: CPTII,S$GLB,, | Performed by: OBSTETRICS & GYNECOLOGY

## 2021-11-09 PROCEDURE — 3008F PR BODY MASS INDEX (BMI) DOCUMENTED: ICD-10-PCS | Mod: CPTII,S$GLB,, | Performed by: OBSTETRICS & GYNECOLOGY

## 2021-11-09 PROCEDURE — 1101F PR PT FALLS ASSESS DOC 0-1 FALLS W/OUT INJ PAST YR: ICD-10-PCS | Mod: CPTII,S$GLB,, | Performed by: OBSTETRICS & GYNECOLOGY

## 2021-11-09 PROCEDURE — 3061F NEG MICROALBUMINURIA REV: CPT | Mod: CPTII,S$GLB,, | Performed by: OBSTETRICS & GYNECOLOGY

## 2021-11-09 PROCEDURE — 3066F PR DOCUMENTATION OF TREATMENT FOR NEPHROPATHY: ICD-10-PCS | Mod: CPTII,S$GLB,, | Performed by: OBSTETRICS & GYNECOLOGY

## 2021-11-09 PROCEDURE — 3075F SYST BP GE 130 - 139MM HG: CPT | Mod: CPTII,S$GLB,, | Performed by: OBSTETRICS & GYNECOLOGY

## 2021-11-09 PROCEDURE — 1101F PT FALLS ASSESS-DOCD LE1/YR: CPT | Mod: CPTII,S$GLB,, | Performed by: OBSTETRICS & GYNECOLOGY

## 2021-11-09 PROCEDURE — 1126F PR PAIN SEVERITY QUANTIFIED, NO PAIN PRESENT: ICD-10-PCS | Mod: CPTII,S$GLB,, | Performed by: OBSTETRICS & GYNECOLOGY

## 2021-11-09 PROCEDURE — 3288F FALL RISK ASSESSMENT DOCD: CPT | Mod: CPTII,S$GLB,, | Performed by: OBSTETRICS & GYNECOLOGY

## 2021-11-09 PROCEDURE — 3044F PR MOST RECENT HEMOGLOBIN A1C LEVEL <7.0%: ICD-10-PCS | Mod: CPTII,S$GLB,, | Performed by: OBSTETRICS & GYNECOLOGY

## 2021-11-09 PROCEDURE — 99999 PR PBB SHADOW E&M-EST. PATIENT-LVL IV: CPT | Mod: PBBFAC,,, | Performed by: OBSTETRICS & GYNECOLOGY

## 2021-11-09 PROCEDURE — 99213 OFFICE O/P EST LOW 20 MIN: CPT | Mod: 25,S$GLB,, | Performed by: OBSTETRICS & GYNECOLOGY

## 2021-11-09 PROCEDURE — 1159F PR MEDICATION LIST DOCUMENTED IN MEDICAL RECORD: ICD-10-PCS | Mod: CPTII,S$GLB,, | Performed by: OBSTETRICS & GYNECOLOGY

## 2021-11-09 PROCEDURE — 3288F PR FALLS RISK ASSESSMENT DOCUMENTED: ICD-10-PCS | Mod: CPTII,S$GLB,, | Performed by: OBSTETRICS & GYNECOLOGY

## 2021-11-09 PROCEDURE — 3079F DIAST BP 80-89 MM HG: CPT | Mod: CPTII,S$GLB,, | Performed by: OBSTETRICS & GYNECOLOGY

## 2021-11-29 RX ORDER — SULFAMETHOXAZOLE AND TRIMETHOPRIM 800; 160 MG/1; MG/1
1 TABLET ORAL DAILY
Qty: 42 TABLET | Refills: 1 | Status: SHIPPED | OUTPATIENT
Start: 2021-11-29 | End: 2022-02-22

## 2021-11-30 ENCOUNTER — PATIENT MESSAGE (OUTPATIENT)
Dept: FAMILY MEDICINE | Facility: CLINIC | Age: 71
End: 2021-11-30
Payer: MEDICARE

## 2021-12-01 RX ORDER — DIPHENOXYLATE HYDROCHLORIDE AND ATROPINE SULFATE 2.5; .025 MG/1; MG/1
1 TABLET ORAL 4 TIMES DAILY PRN
Qty: 30 TABLET | Refills: 1 | Status: SHIPPED | OUTPATIENT
Start: 2021-12-01 | End: 2021-12-11

## 2021-12-05 ENCOUNTER — PATIENT MESSAGE (OUTPATIENT)
Dept: FAMILY MEDICINE | Facility: CLINIC | Age: 71
End: 2021-12-05
Payer: MEDICARE

## 2021-12-06 ENCOUNTER — PATIENT MESSAGE (OUTPATIENT)
Dept: FAMILY MEDICINE | Facility: CLINIC | Age: 71
End: 2021-12-06
Payer: MEDICARE

## 2021-12-30 DIAGNOSIS — E11.9 TYPE 2 DIABETES MELLITUS WITHOUT COMPLICATIONS: ICD-10-CM

## 2021-12-30 DIAGNOSIS — I10 ESSENTIAL (PRIMARY) HYPERTENSION: ICD-10-CM

## 2021-12-30 NOTE — TELEPHONE ENCOUNTER
Care Due:                  Date            Visit Type   Department     Provider  --------------------------------------------------------------------------------                                             [facility]    Jennifer Casillas  Last Visit: 08-      None         MEDICINE       Anger  Next Visit: None Scheduled  None         None Found                                                            Last  Test          Frequency    Reason                     Performed    Due Date  --------------------------------------------------------------------------------    HBA1C.......  6 months...  JANUMET..................  08- 02-    Powered by [employer]. Reference number: [id number].   12/30/2021 3:20:48 PM CST

## 2021-12-31 RX ORDER — LISINOPRIL 5 MG/1
5 TABLET ORAL DAILY
Qty: 90 TABLET | Refills: 1 | Status: SHIPPED | OUTPATIENT
Start: 2021-12-31 | End: 2022-05-09

## 2022-02-15 ENCOUNTER — LAB VISIT (OUTPATIENT)
Dept: LAB | Facility: HOSPITAL | Age: 72
End: 2022-02-15
Attending: FAMILY MEDICINE
Payer: MEDICARE

## 2022-02-15 DIAGNOSIS — E11.8 DIABETES MELLITUS TYPE 2 WITH COMPLICATIONS: ICD-10-CM

## 2022-02-15 LAB
ESTIMATED AVG GLUCOSE: 131 MG/DL (ref 68–131)
HBA1C MFR BLD: 6.2 % (ref 4–5.6)

## 2022-02-15 PROCEDURE — 83036 HEMOGLOBIN GLYCOSYLATED A1C: CPT | Performed by: FAMILY MEDICINE

## 2022-02-15 PROCEDURE — 36415 COLL VENOUS BLD VENIPUNCTURE: CPT | Mod: PO | Performed by: FAMILY MEDICINE

## 2022-02-20 ENCOUNTER — PATIENT MESSAGE (OUTPATIENT)
Dept: FAMILY MEDICINE | Facility: CLINIC | Age: 72
End: 2022-02-20
Payer: MEDICARE

## 2022-02-22 ENCOUNTER — PATIENT MESSAGE (OUTPATIENT)
Dept: FAMILY MEDICINE | Facility: CLINIC | Age: 72
End: 2022-02-22

## 2022-02-22 ENCOUNTER — OFFICE VISIT (OUTPATIENT)
Dept: FAMILY MEDICINE | Facility: CLINIC | Age: 72
End: 2022-02-22
Payer: MEDICARE

## 2022-02-22 VITALS
BODY MASS INDEX: 23.04 KG/M2 | TEMPERATURE: 99 F | RESPIRATION RATE: 20 BRPM | SYSTOLIC BLOOD PRESSURE: 122 MMHG | WEIGHT: 138.31 LBS | HEART RATE: 84 BPM | HEIGHT: 65 IN | OXYGEN SATURATION: 98 % | DIASTOLIC BLOOD PRESSURE: 63 MMHG

## 2022-02-22 DIAGNOSIS — E11.69 HYPERLIPIDEMIA ASSOCIATED WITH TYPE 2 DIABETES MELLITUS: ICD-10-CM

## 2022-02-22 DIAGNOSIS — I15.2 HYPERTENSION ASSOCIATED WITH DIABETES: ICD-10-CM

## 2022-02-22 DIAGNOSIS — E78.5 HYPERLIPIDEMIA ASSOCIATED WITH TYPE 2 DIABETES MELLITUS: ICD-10-CM

## 2022-02-22 DIAGNOSIS — Z78.0 MENOPAUSE: ICD-10-CM

## 2022-02-22 DIAGNOSIS — E11.8 DIABETES MELLITUS TYPE 2 WITH COMPLICATIONS: Primary | ICD-10-CM

## 2022-02-22 DIAGNOSIS — E11.59 HYPERTENSION ASSOCIATED WITH DIABETES: ICD-10-CM

## 2022-02-22 PROCEDURE — 1101F PT FALLS ASSESS-DOCD LE1/YR: CPT | Mod: CPTII,S$GLB,, | Performed by: FAMILY MEDICINE

## 2022-02-22 PROCEDURE — 3066F PR DOCUMENTATION OF TREATMENT FOR NEPHROPATHY: ICD-10-PCS | Mod: CPTII,S$GLB,, | Performed by: FAMILY MEDICINE

## 2022-02-22 PROCEDURE — 3061F PR NEG MICROALBUMINURIA RESULT DOCUMENTED/REVIEW: ICD-10-PCS | Mod: CPTII,S$GLB,, | Performed by: FAMILY MEDICINE

## 2022-02-22 PROCEDURE — 3066F NEPHROPATHY DOC TX: CPT | Mod: CPTII,S$GLB,, | Performed by: FAMILY MEDICINE

## 2022-02-22 PROCEDURE — 99214 OFFICE O/P EST MOD 30 MIN: CPT | Mod: S$GLB,,, | Performed by: FAMILY MEDICINE

## 2022-02-22 PROCEDURE — 1157F PR ADVANCE CARE PLAN OR EQUIV PRESENT IN MEDICAL RECORD: ICD-10-PCS | Mod: CPTII,S$GLB,, | Performed by: FAMILY MEDICINE

## 2022-02-22 PROCEDURE — 1159F MED LIST DOCD IN RCRD: CPT | Mod: CPTII,S$GLB,, | Performed by: FAMILY MEDICINE

## 2022-02-22 PROCEDURE — 3008F BODY MASS INDEX DOCD: CPT | Mod: CPTII,S$GLB,, | Performed by: FAMILY MEDICINE

## 2022-02-22 PROCEDURE — 3074F PR MOST RECENT SYSTOLIC BLOOD PRESSURE < 130 MM HG: ICD-10-PCS | Mod: CPTII,S$GLB,, | Performed by: FAMILY MEDICINE

## 2022-02-22 PROCEDURE — 3074F SYST BP LT 130 MM HG: CPT | Mod: CPTII,S$GLB,, | Performed by: FAMILY MEDICINE

## 2022-02-22 PROCEDURE — 3078F PR MOST RECENT DIASTOLIC BLOOD PRESSURE < 80 MM HG: ICD-10-PCS | Mod: CPTII,S$GLB,, | Performed by: FAMILY MEDICINE

## 2022-02-22 PROCEDURE — 3078F DIAST BP <80 MM HG: CPT | Mod: CPTII,S$GLB,, | Performed by: FAMILY MEDICINE

## 2022-02-22 PROCEDURE — 99499 RISK ADDL DX/OHS AUDIT: ICD-10-PCS | Mod: S$GLB,,, | Performed by: FAMILY MEDICINE

## 2022-02-22 PROCEDURE — 3044F HG A1C LEVEL LT 7.0%: CPT | Mod: CPTII,S$GLB,, | Performed by: FAMILY MEDICINE

## 2022-02-22 PROCEDURE — 3044F PR MOST RECENT HEMOGLOBIN A1C LEVEL <7.0%: ICD-10-PCS | Mod: CPTII,S$GLB,, | Performed by: FAMILY MEDICINE

## 2022-02-22 PROCEDURE — 3061F NEG MICROALBUMINURIA REV: CPT | Mod: CPTII,S$GLB,, | Performed by: FAMILY MEDICINE

## 2022-02-22 PROCEDURE — 1159F PR MEDICATION LIST DOCUMENTED IN MEDICAL RECORD: ICD-10-PCS | Mod: CPTII,S$GLB,, | Performed by: FAMILY MEDICINE

## 2022-02-22 PROCEDURE — 1160F RVW MEDS BY RX/DR IN RCRD: CPT | Mod: CPTII,S$GLB,, | Performed by: FAMILY MEDICINE

## 2022-02-22 PROCEDURE — 1160F PR REVIEW ALL MEDS BY PRESCRIBER/CLIN PHARMACIST DOCUMENTED: ICD-10-PCS | Mod: CPTII,S$GLB,, | Performed by: FAMILY MEDICINE

## 2022-02-22 PROCEDURE — 99214 PR OFFICE/OUTPT VISIT, EST, LEVL IV, 30-39 MIN: ICD-10-PCS | Mod: S$GLB,,, | Performed by: FAMILY MEDICINE

## 2022-02-22 PROCEDURE — 3008F PR BODY MASS INDEX (BMI) DOCUMENTED: ICD-10-PCS | Mod: CPTII,S$GLB,, | Performed by: FAMILY MEDICINE

## 2022-02-22 PROCEDURE — 1101F PR PT FALLS ASSESS DOC 0-1 FALLS W/OUT INJ PAST YR: ICD-10-PCS | Mod: CPTII,S$GLB,, | Performed by: FAMILY MEDICINE

## 2022-02-22 PROCEDURE — 3288F PR FALLS RISK ASSESSMENT DOCUMENTED: ICD-10-PCS | Mod: CPTII,S$GLB,, | Performed by: FAMILY MEDICINE

## 2022-02-22 PROCEDURE — 99499 UNLISTED E&M SERVICE: CPT | Mod: S$GLB,,, | Performed by: FAMILY MEDICINE

## 2022-02-22 PROCEDURE — 1157F ADVNC CARE PLAN IN RCRD: CPT | Mod: CPTII,S$GLB,, | Performed by: FAMILY MEDICINE

## 2022-02-22 PROCEDURE — 3288F FALL RISK ASSESSMENT DOCD: CPT | Mod: CPTII,S$GLB,, | Performed by: FAMILY MEDICINE

## 2022-02-22 RX ORDER — OMEPRAZOLE 40 MG/1
CAPSULE, DELAYED RELEASE ORAL
COMMUNITY
Start: 2021-10-09 | End: 2022-02-22

## 2022-02-22 RX ORDER — SULFAMETHOXAZOLE AND TRIMETHOPRIM 800; 160 MG/1; MG/1
1 TABLET ORAL DAILY PRN
COMMUNITY
End: 2022-09-07 | Stop reason: SDUPTHER

## 2022-02-24 RX ORDER — SULFAMETHOXAZOLE AND TRIMETHOPRIM 800; 160 MG/1; MG/1
1 TABLET ORAL DAILY PRN
Status: CANCELLED | OUTPATIENT
Start: 2022-02-24

## 2022-02-24 NOTE — TELEPHONE ENCOUNTER
Called and spoke to patient and states she  Does not need the refill of the bactrim right now she is doing fine  states that she will see Dr Tobias in  March and will get it then if she needs it.

## 2022-02-26 NOTE — PROGRESS NOTES
Subjective:       Patient ID: Katherine Rivers is a 71 y.o. female.    Chief Complaint: Follow-up    HPI   The patient is a 71-year-old who is here today for follow-up.  She is feeling good and is at peace with herself.  She is eating right and occasionally will consume sweets.  She is exercising regularly going to the gym and walking 4-5 miles a day.  She has no questions or concerns regarding her health.  Today we discussed the followin)  Diabetes.  Her A1c is 6.2.  Her sugars are usually in the 112 range.  She is taking Janumet.  She is in her happy place with her diabetes  2) hypertension.  Today her blood pressure is elevated at 156/86.  Her blood pressure is always high here at the doctor's office.  At home, her readings are usually in the 118/70 range.  She does take her lisinopril consistently    She does note that her recurrent UTIs have improved.  She is working with her urologist who she really likes and who has been very helpful    Review of Systems   Constitutional: Negative for appetite change, chills, diaphoresis, fatigue, fever and unexpected weight change.   HENT: Positive for hearing loss. Negative for congestion, ear pain, postnasal drip, rhinorrhea, sinus pressure, sneezing, sore throat and trouble swallowing.    Eyes: Negative for pain, discharge and visual disturbance.   Respiratory: Negative for cough, chest tightness, shortness of breath and wheezing.    Cardiovascular: Negative for chest pain, palpitations and leg swelling.   Gastrointestinal: Negative for abdominal distention, abdominal pain, blood in stool, constipation, diarrhea, nausea and vomiting.   Skin: Negative for rash.       Objective:      Physical Exam  Constitutional:       General: She is not in acute distress.     Appearance: Normal appearance. She is well-developed.   HENT:      Head: Normocephalic and atraumatic.      Right Ear: Tympanic membrane, ear canal and external ear normal. Decreased hearing noted.       Left Ear: Tympanic membrane, ear canal and external ear normal. Decreased hearing noted.      Nose: Nose normal.      Mouth/Throat:      Mouth: No oral lesions.      Pharynx: No oropharyngeal exudate or posterior oropharyngeal erythema.   Eyes:      General: Lids are normal. No scleral icterus.     Extraocular Movements: Extraocular movements intact.      Conjunctiva/sclera: Conjunctivae normal.      Pupils: Pupils are equal, round, and reactive to light.   Neck:      Thyroid: No thyroid mass or thyromegaly.      Vascular: No carotid bruit.   Cardiovascular:      Rate and Rhythm: Normal rate and regular rhythm.  No extrasystoles are present.     Chest Wall: PMI is not displaced.      Pulses:           Dorsalis pedis pulses are 2+ on the right side and 2+ on the left side.        Posterior tibial pulses are 2+ on the right side and 2+ on the left side.      Heart sounds: Normal heart sounds. No murmur heard.    No gallop.   Pulmonary:      Effort: Pulmonary effort is normal. No accessory muscle usage or respiratory distress.      Breath sounds: Normal breath sounds.   Chest:   Breasts:      Right: No supraclavicular adenopathy.      Left: No supraclavicular adenopathy.       Abdominal:      General: Bowel sounds are normal. There is no abdominal bruit.      Palpations: Abdomen is soft.      Tenderness: There is no abdominal tenderness. There is no rebound.   Musculoskeletal:      Cervical back: Normal range of motion and neck supple.      Right foot: No deformity.      Left foot: No deformity.   Feet:      Right foot:      Protective Sensation: 10 sites tested. 10 sites sensed.      Skin integrity: Warmth present. No ulcer or callus.      Left foot:      Protective Sensation: 10 sites tested. 10 sites sensed.      Skin integrity: Warmth present. No ulcer or callus.   Lymphadenopathy:      Head:      Right side of head: No submental or submandibular adenopathy.      Left side of head: No submental or submandibular  "adenopathy.      Cervical:      Right cervical: No superficial, deep or posterior cervical adenopathy.     Left cervical: No superficial, deep or posterior cervical adenopathy.      Upper Body:      Right upper body: No supraclavicular adenopathy.      Left upper body: No supraclavicular adenopathy.   Skin:     General: Skin is warm and dry.   Neurological:      Mental Status: She is alert and oriented to person, place, and time.       Blood pressure 122/63, pulse 84, temperature 98.7 °F (37.1 °C), resp. rate 20, height 5' 5" (1.651 m), weight 62.8 kg (138 lb 5.4 oz), last menstrual period 05/23/2012, SpO2 98 %.Body mass index is 23.02 kg/m².          A/P:  1) diabetes.  Well controlled.  Continue with Janumet.  We will check an A1c in 6 months  2) hypertension.  Well controlled at home.  Continue with lisinopril.  If her home readings are consistently higher than 135/85, she will let me know  3) hyperlipidemia.  Previously well controlled.  Continue with Lipitor.  We will check fasting cholesterol panel prior to her next visit  4) health maintenance issues.  We will schedule her DEXA scan              As long as she does well, I will see her back in 6 months with labs prior or sooner if needed  "

## 2022-03-03 ENCOUNTER — HOSPITAL ENCOUNTER (OUTPATIENT)
Dept: RADIOLOGY | Facility: HOSPITAL | Age: 72
Discharge: HOME | End: 2022-03-03
Attending: FAMILY MEDICINE
Payer: MEDICARE

## 2022-03-03 DIAGNOSIS — Z78.0 MENOPAUSE: ICD-10-CM

## 2022-03-03 PROCEDURE — 77080 DXA BONE DENSITY AXIAL: CPT | Mod: 26,,, | Performed by: RADIOLOGY

## 2022-03-03 PROCEDURE — 77080 DXA BONE DENSITY AXIAL: CPT | Mod: TC,PO

## 2022-03-03 PROCEDURE — 77080 DEXA BONE DENSITY SPINE HIP: ICD-10-PCS | Mod: 26,,, | Performed by: RADIOLOGY

## 2022-03-04 ENCOUNTER — PES CALL (OUTPATIENT)
Dept: ADMINISTRATIVE | Facility: CLINIC | Age: 72
End: 2022-03-04
Payer: MEDICARE

## 2022-03-06 ENCOUNTER — PATIENT MESSAGE (OUTPATIENT)
Dept: FAMILY MEDICINE | Facility: CLINIC | Age: 72
End: 2022-03-06
Payer: MEDICARE

## 2022-03-07 ENCOUNTER — OFFICE VISIT (OUTPATIENT)
Dept: UROGYNECOLOGY | Facility: CLINIC | Age: 72
End: 2022-03-07
Payer: MEDICARE

## 2022-03-07 VITALS
WEIGHT: 137.38 LBS | SYSTOLIC BLOOD PRESSURE: 133 MMHG | HEIGHT: 65 IN | DIASTOLIC BLOOD PRESSURE: 78 MMHG | BODY MASS INDEX: 22.89 KG/M2 | HEART RATE: 72 BPM

## 2022-03-07 DIAGNOSIS — N32.81 OAB (OVERACTIVE BLADDER): ICD-10-CM

## 2022-03-07 DIAGNOSIS — R35.0 URINARY FREQUENCY: Primary | ICD-10-CM

## 2022-03-07 DIAGNOSIS — N39.0 RECURRENT UTI: ICD-10-CM

## 2022-03-07 DIAGNOSIS — R35.1 NOCTURIA: ICD-10-CM

## 2022-03-07 LAB
BILIRUB SERPL-MCNC: NEGATIVE MG/DL
BLOOD URINE, POC: NEGATIVE
CLARITY, POC UA: CLEAR
COLOR, POC UA: YELLOW
GLUCOSE UR QL STRIP: NEGATIVE
KETONES UR QL STRIP: NEGATIVE
LEUKOCYTE ESTERASE URINE, POC: NEGATIVE
NITRITE, POC UA: NEGATIVE
PH, POC UA: 6
PROTEIN, POC: NEGATIVE
SPECIFIC GRAVITY, POC UA: 1.01
UROBILINOGEN, POC UA: NORMAL

## 2022-03-07 PROCEDURE — 3075F SYST BP GE 130 - 139MM HG: CPT | Mod: CPTII,S$GLB,, | Performed by: OBSTETRICS & GYNECOLOGY

## 2022-03-07 PROCEDURE — 3075F PR MOST RECENT SYSTOLIC BLOOD PRESS GE 130-139MM HG: ICD-10-PCS | Mod: CPTII,S$GLB,, | Performed by: OBSTETRICS & GYNECOLOGY

## 2022-03-07 PROCEDURE — 1101F PT FALLS ASSESS-DOCD LE1/YR: CPT | Mod: CPTII,S$GLB,, | Performed by: OBSTETRICS & GYNECOLOGY

## 2022-03-07 PROCEDURE — 1160F RVW MEDS BY RX/DR IN RCRD: CPT | Mod: CPTII,S$GLB,, | Performed by: OBSTETRICS & GYNECOLOGY

## 2022-03-07 PROCEDURE — 3044F HG A1C LEVEL LT 7.0%: CPT | Mod: CPTII,S$GLB,, | Performed by: OBSTETRICS & GYNECOLOGY

## 2022-03-07 PROCEDURE — 1157F PR ADVANCE CARE PLAN OR EQUIV PRESENT IN MEDICAL RECORD: ICD-10-PCS | Mod: CPTII,S$GLB,, | Performed by: OBSTETRICS & GYNECOLOGY

## 2022-03-07 PROCEDURE — 99999 PR PBB SHADOW E&M-EST. PATIENT-LVL IV: CPT | Mod: PBBFAC,,, | Performed by: OBSTETRICS & GYNECOLOGY

## 2022-03-07 PROCEDURE — 1126F AMNT PAIN NOTED NONE PRSNT: CPT | Mod: CPTII,S$GLB,, | Performed by: OBSTETRICS & GYNECOLOGY

## 2022-03-07 PROCEDURE — 81002 POCT URINE DIPSTICK WITHOUT MICROSCOPE: ICD-10-PCS | Mod: S$GLB,,, | Performed by: OBSTETRICS & GYNECOLOGY

## 2022-03-07 PROCEDURE — 3288F PR FALLS RISK ASSESSMENT DOCUMENTED: ICD-10-PCS | Mod: CPTII,S$GLB,, | Performed by: OBSTETRICS & GYNECOLOGY

## 2022-03-07 PROCEDURE — 99999 PR PBB SHADOW E&M-EST. PATIENT-LVL IV: ICD-10-PCS | Mod: PBBFAC,,, | Performed by: OBSTETRICS & GYNECOLOGY

## 2022-03-07 PROCEDURE — 1157F ADVNC CARE PLAN IN RCRD: CPT | Mod: CPTII,S$GLB,, | Performed by: OBSTETRICS & GYNECOLOGY

## 2022-03-07 PROCEDURE — 99213 PR OFFICE/OUTPT VISIT, EST, LEVL III, 20-29 MIN: ICD-10-PCS | Mod: 25,S$GLB,, | Performed by: OBSTETRICS & GYNECOLOGY

## 2022-03-07 PROCEDURE — 1159F MED LIST DOCD IN RCRD: CPT | Mod: CPTII,S$GLB,, | Performed by: OBSTETRICS & GYNECOLOGY

## 2022-03-07 PROCEDURE — 3061F NEG MICROALBUMINURIA REV: CPT | Mod: CPTII,S$GLB,, | Performed by: OBSTETRICS & GYNECOLOGY

## 2022-03-07 PROCEDURE — 3008F PR BODY MASS INDEX (BMI) DOCUMENTED: ICD-10-PCS | Mod: CPTII,S$GLB,, | Performed by: OBSTETRICS & GYNECOLOGY

## 2022-03-07 PROCEDURE — 3288F FALL RISK ASSESSMENT DOCD: CPT | Mod: CPTII,S$GLB,, | Performed by: OBSTETRICS & GYNECOLOGY

## 2022-03-07 PROCEDURE — 3008F BODY MASS INDEX DOCD: CPT | Mod: CPTII,S$GLB,, | Performed by: OBSTETRICS & GYNECOLOGY

## 2022-03-07 PROCEDURE — 3078F PR MOST RECENT DIASTOLIC BLOOD PRESSURE < 80 MM HG: ICD-10-PCS | Mod: CPTII,S$GLB,, | Performed by: OBSTETRICS & GYNECOLOGY

## 2022-03-07 PROCEDURE — 99213 OFFICE O/P EST LOW 20 MIN: CPT | Mod: 25,S$GLB,, | Performed by: OBSTETRICS & GYNECOLOGY

## 2022-03-07 PROCEDURE — 3066F PR DOCUMENTATION OF TREATMENT FOR NEPHROPATHY: ICD-10-PCS | Mod: CPTII,S$GLB,, | Performed by: OBSTETRICS & GYNECOLOGY

## 2022-03-07 PROCEDURE — 1159F PR MEDICATION LIST DOCUMENTED IN MEDICAL RECORD: ICD-10-PCS | Mod: CPTII,S$GLB,, | Performed by: OBSTETRICS & GYNECOLOGY

## 2022-03-07 PROCEDURE — 3066F NEPHROPATHY DOC TX: CPT | Mod: CPTII,S$GLB,, | Performed by: OBSTETRICS & GYNECOLOGY

## 2022-03-07 PROCEDURE — 3061F PR NEG MICROALBUMINURIA RESULT DOCUMENTED/REVIEW: ICD-10-PCS | Mod: CPTII,S$GLB,, | Performed by: OBSTETRICS & GYNECOLOGY

## 2022-03-07 PROCEDURE — 1126F PR PAIN SEVERITY QUANTIFIED, NO PAIN PRESENT: ICD-10-PCS | Mod: CPTII,S$GLB,, | Performed by: OBSTETRICS & GYNECOLOGY

## 2022-03-07 PROCEDURE — 1101F PR PT FALLS ASSESS DOC 0-1 FALLS W/OUT INJ PAST YR: ICD-10-PCS | Mod: CPTII,S$GLB,, | Performed by: OBSTETRICS & GYNECOLOGY

## 2022-03-07 PROCEDURE — 1160F PR REVIEW ALL MEDS BY PRESCRIBER/CLIN PHARMACIST DOCUMENTED: ICD-10-PCS | Mod: CPTII,S$GLB,, | Performed by: OBSTETRICS & GYNECOLOGY

## 2022-03-07 PROCEDURE — 3078F DIAST BP <80 MM HG: CPT | Mod: CPTII,S$GLB,, | Performed by: OBSTETRICS & GYNECOLOGY

## 2022-03-07 PROCEDURE — 3044F PR MOST RECENT HEMOGLOBIN A1C LEVEL <7.0%: ICD-10-PCS | Mod: CPTII,S$GLB,, | Performed by: OBSTETRICS & GYNECOLOGY

## 2022-03-07 PROCEDURE — 81002 URINALYSIS NONAUTO W/O SCOPE: CPT | Mod: S$GLB,,, | Performed by: OBSTETRICS & GYNECOLOGY

## 2022-03-07 NOTE — PROGRESS NOTES
Subjective:     Chief Complaint:  Recurrent UTI  History of Present Illness: Katherine Rivers is a 71 y.o. female who presents for recurrent UTI, nocturia, and mild overactive bladder. she has done very prophylaxis and Flomax.  She sleeps 5-6 hours before waking and typically voids only 1 time during the night.  She has had no recent UTIs or dysuria.  Her urinalysis today is normal.  She continues to do pelvic floor exercises.  She uses prophylaxis postcoital or other times of risk but she avoids most risky activities including bathing and bicycle riding.  Her overactive symptoms are minimal to none at this point.  She has no side effects with medications.  She has significant travel coming up this year in question whether she can get travel prescriptions for the prophylaxis    Review of Systems    Constitutional:  IBS  Eyes:  Cataracts  ENT: No headaches.   Respiratory:  Nonsmoker  Cardiovascular:  Hyperlipidemia  Gastrointestinal:  GERD   Genitourinary: No vaginal bleeding or discharge.  Integument/Breast: Negative  Hematologic/Lymphatic: No history of anemia.  Musculoskeletal:  Osteopenia  Neurological: No known disc problems. No paresthesias.  Behavioral/Psych: No history of depression.   Endocrine:  Diabetes  Allergy/Immune: no recent reactions     Objective:   General Exam:  General appearance:  Wearing mask  HEENT: [place]. EOM's intact.  Neck: Normal thyroid.   Back: No CVA tenderness.  RESP: No SOB.  Breasts: deferred  Abdomen: Benign without localizing signs.  Extremities: No edema. No varices.  Lymphatic: noncontributory  Skin: No rashes. No lesions.  Neurologic: Intact.   Psych: Oriented.       Assessment:   Doing well with Flomax and postcoital prophylaxis.  No side effects       Plan:    Discussed her activities going forward.  She will bring a specimen in should she develop any symptoms suggestive of a UTI

## 2022-03-10 ENCOUNTER — OFFICE VISIT (OUTPATIENT)
Dept: HOME HEALTH SERVICES | Facility: CLINIC | Age: 72
End: 2022-03-10
Payer: MEDICARE

## 2022-03-10 VITALS
HEIGHT: 65 IN | HEART RATE: 66 BPM | WEIGHT: 137 LBS | BODY MASS INDEX: 22.82 KG/M2 | DIASTOLIC BLOOD PRESSURE: 71 MMHG | OXYGEN SATURATION: 98 % | SYSTOLIC BLOOD PRESSURE: 123 MMHG

## 2022-03-10 DIAGNOSIS — E11.9 TYPE 2 DIABETES MELLITUS WITHOUT COMPLICATION, WITHOUT LONG-TERM CURRENT USE OF INSULIN: ICD-10-CM

## 2022-03-10 DIAGNOSIS — E78.2 MIXED HYPERLIPIDEMIA: ICD-10-CM

## 2022-03-10 DIAGNOSIS — N32.81 OAB (OVERACTIVE BLADDER): ICD-10-CM

## 2022-03-10 DIAGNOSIS — E11.59 HYPERTENSION ASSOCIATED WITH DIABETES: ICD-10-CM

## 2022-03-10 DIAGNOSIS — K21.9 GASTROESOPHAGEAL REFLUX DISEASE WITHOUT ESOPHAGITIS: ICD-10-CM

## 2022-03-10 DIAGNOSIS — I15.2 HYPERTENSION ASSOCIATED WITH DIABETES: ICD-10-CM

## 2022-03-10 DIAGNOSIS — Z00.00 ENCOUNTER FOR PREVENTIVE HEALTH EXAMINATION: Primary | ICD-10-CM

## 2022-03-10 PROCEDURE — 3288F PR FALLS RISK ASSESSMENT DOCUMENTED: ICD-10-PCS | Mod: CPTII,S$GLB,, | Performed by: NURSE PRACTITIONER

## 2022-03-10 PROCEDURE — 3008F PR BODY MASS INDEX (BMI) DOCUMENTED: ICD-10-PCS | Mod: CPTII,S$GLB,, | Performed by: NURSE PRACTITIONER

## 2022-03-10 PROCEDURE — 3078F DIAST BP <80 MM HG: CPT | Mod: CPTII,S$GLB,, | Performed by: NURSE PRACTITIONER

## 2022-03-10 PROCEDURE — 3288F FALL RISK ASSESSMENT DOCD: CPT | Mod: CPTII,S$GLB,, | Performed by: NURSE PRACTITIONER

## 2022-03-10 PROCEDURE — G0439 PPPS, SUBSEQ VISIT: HCPCS | Mod: S$GLB,,, | Performed by: NURSE PRACTITIONER

## 2022-03-10 PROCEDURE — G0439 PR MEDICARE ANNUAL WELLNESS SUBSEQUENT VISIT: ICD-10-PCS | Mod: S$GLB,,, | Performed by: NURSE PRACTITIONER

## 2022-03-10 PROCEDURE — 99499 UNLISTED E&M SERVICE: CPT | Mod: S$GLB,,, | Performed by: NURSE PRACTITIONER

## 2022-03-10 PROCEDURE — 1101F PR PT FALLS ASSESS DOC 0-1 FALLS W/OUT INJ PAST YR: ICD-10-PCS | Mod: CPTII,S$GLB,, | Performed by: NURSE PRACTITIONER

## 2022-03-10 PROCEDURE — 1157F PR ADVANCE CARE PLAN OR EQUIV PRESENT IN MEDICAL RECORD: ICD-10-PCS | Mod: CPTII,S$GLB,, | Performed by: NURSE PRACTITIONER

## 2022-03-10 PROCEDURE — 3008F BODY MASS INDEX DOCD: CPT | Mod: CPTII,S$GLB,, | Performed by: NURSE PRACTITIONER

## 2022-03-10 PROCEDURE — 3066F NEPHROPATHY DOC TX: CPT | Mod: CPTII,S$GLB,, | Performed by: NURSE PRACTITIONER

## 2022-03-10 PROCEDURE — 3061F PR NEG MICROALBUMINURIA RESULT DOCUMENTED/REVIEW: ICD-10-PCS | Mod: CPTII,S$GLB,, | Performed by: NURSE PRACTITIONER

## 2022-03-10 PROCEDURE — 1159F PR MEDICATION LIST DOCUMENTED IN MEDICAL RECORD: ICD-10-PCS | Mod: CPTII,S$GLB,, | Performed by: NURSE PRACTITIONER

## 2022-03-10 PROCEDURE — 3074F PR MOST RECENT SYSTOLIC BLOOD PRESSURE < 130 MM HG: ICD-10-PCS | Mod: CPTII,S$GLB,, | Performed by: NURSE PRACTITIONER

## 2022-03-10 PROCEDURE — 1101F PT FALLS ASSESS-DOCD LE1/YR: CPT | Mod: CPTII,S$GLB,, | Performed by: NURSE PRACTITIONER

## 2022-03-10 PROCEDURE — 3074F SYST BP LT 130 MM HG: CPT | Mod: CPTII,S$GLB,, | Performed by: NURSE PRACTITIONER

## 2022-03-10 PROCEDURE — 3044F HG A1C LEVEL LT 7.0%: CPT | Mod: CPTII,S$GLB,, | Performed by: NURSE PRACTITIONER

## 2022-03-10 PROCEDURE — 99499 RISK ADDL DX/OHS AUDIT: ICD-10-PCS | Mod: S$GLB,,, | Performed by: NURSE PRACTITIONER

## 2022-03-10 PROCEDURE — 1160F RVW MEDS BY RX/DR IN RCRD: CPT | Mod: CPTII,S$GLB,, | Performed by: NURSE PRACTITIONER

## 2022-03-10 PROCEDURE — 3044F PR MOST RECENT HEMOGLOBIN A1C LEVEL <7.0%: ICD-10-PCS | Mod: CPTII,S$GLB,, | Performed by: NURSE PRACTITIONER

## 2022-03-10 PROCEDURE — 1159F MED LIST DOCD IN RCRD: CPT | Mod: CPTII,S$GLB,, | Performed by: NURSE PRACTITIONER

## 2022-03-10 PROCEDURE — 1157F ADVNC CARE PLAN IN RCRD: CPT | Mod: CPTII,S$GLB,, | Performed by: NURSE PRACTITIONER

## 2022-03-10 PROCEDURE — 3061F NEG MICROALBUMINURIA REV: CPT | Mod: CPTII,S$GLB,, | Performed by: NURSE PRACTITIONER

## 2022-03-10 PROCEDURE — 1160F PR REVIEW ALL MEDS BY PRESCRIBER/CLIN PHARMACIST DOCUMENTED: ICD-10-PCS | Mod: CPTII,S$GLB,, | Performed by: NURSE PRACTITIONER

## 2022-03-10 PROCEDURE — 3066F PR DOCUMENTATION OF TREATMENT FOR NEPHROPATHY: ICD-10-PCS | Mod: CPTII,S$GLB,, | Performed by: NURSE PRACTITIONER

## 2022-03-10 PROCEDURE — 3078F PR MOST RECENT DIASTOLIC BLOOD PRESSURE < 80 MM HG: ICD-10-PCS | Mod: CPTII,S$GLB,, | Performed by: NURSE PRACTITIONER

## 2022-03-11 PROBLEM — K21.9 GASTROESOPHAGEAL REFLUX DISEASE WITHOUT ESOPHAGITIS: Status: ACTIVE | Noted: 2022-03-11

## 2022-03-11 PROBLEM — E78.00 PURE HYPERCHOLESTEROLEMIA: Status: ACTIVE | Noted: 2021-02-12

## 2022-03-11 PROBLEM — E11.59 HYPERTENSION ASSOCIATED WITH DIABETES: Status: ACTIVE | Noted: 2021-02-12

## 2022-03-11 PROBLEM — I15.2 HYPERTENSION ASSOCIATED WITH DIABETES: Status: ACTIVE | Noted: 2021-02-12

## 2022-03-11 PROBLEM — E78.2 MIXED HYPERLIPIDEMIA: Status: ACTIVE | Noted: 2021-02-12

## 2022-03-11 PROBLEM — N32.81 OAB (OVERACTIVE BLADDER): Status: ACTIVE | Noted: 2022-03-11

## 2022-03-11 PROBLEM — I10 ESSENTIAL HYPERTENSION: Status: ACTIVE | Noted: 2021-02-12

## 2022-03-11 PROBLEM — E53.8 VITAMIN B12 DEFICIENCY: Status: ACTIVE | Noted: 2021-02-12

## 2022-03-11 PROBLEM — E55.9 HYPOVITAMINOSIS D: Status: ACTIVE | Noted: 2021-02-12

## 2022-03-11 PROBLEM — E11.9 TYPE 2 DIABETES MELLITUS WITHOUT COMPLICATION, WITHOUT LONG-TERM CURRENT USE OF INSULIN: Status: ACTIVE | Noted: 2021-02-12

## 2022-03-11 NOTE — PROGRESS NOTES
"Katherine Rivers presented for a  Medicare AWV and comprehensive Health Risk Assessment today. The following components were reviewed and updated:    · Medical history  · Family History  · Social history  · Allergies and Current Medications  · Health Risk Assessment  · Health Maintenance  · Care Team         ** See Completed Assessments for Annual Wellness Visit within the encounter summary.**         The following assessments were completed:  · Living Situation  · CAGE  · Depression Screening  · Timed Get Up and Go  · Whisper Test  · Cognitive Function Screening  · Nutrition Screening  · ADL Screening  · PAQ Screening        Vitals:    03/10/22 0914   BP: 123/71   Pulse: 66   SpO2: 98%   Weight: 62.1 kg (137 lb)   Height: 5' 5" (1.651 m)     Body mass index is 22.8 kg/m².  Physical Exam  Constitutional:       Appearance: Normal appearance.   HENT:      Head: Normocephalic and atraumatic.      Nose: Nose normal.   Eyes:      Extraocular Movements: Extraocular movements intact.      Pupils: Pupils are equal, round, and reactive to light.   Cardiovascular:      Rate and Rhythm: Normal rate and regular rhythm.      Pulses: Normal pulses.      Heart sounds: Normal heart sounds.   Pulmonary:      Effort: Pulmonary effort is normal.      Breath sounds: Normal breath sounds.   Musculoskeletal:      Cervical back: Normal range of motion and neck supple.   Neurological:      General: No focal deficit present.      Mental Status: She is alert and oriented to person, place, and time.               Diagnoses and health risks identified today and associated recommendations/orders:    1. Encounter for preventive health examination  AWV complete    2. Hypertension associated with diabetes  Chronic and stable. Continue current treatment. Follow with PCP.  Stable hgb a1c    3. Type 2 diabetes mellitus without complication, without long-term current use of insulin  Chronic and stable. Continue current treatment. Follow with PCP.    4. " Mixed hyperlipidemia  Chronic and stable. Continue current treatment. Follow with PCP.      5. OAB (overactive bladder)  Chronic and stable. Continue current treatment. Follow with PCP.  Seeing Urology       Provided Katherine with a 5-10 year written screening schedule and personal prevention plan. Recommendations were developed using the USPSTF age appropriate recommendations. Education, counseling, and referrals were provided as needed. After Visit Summary printed and given to patient which includes a list of additional screenings\tests needed.    Fu if 1 yr for AWV        Noy Cruz NP  I offered to discuss advanced care planning, including how to pick a person who would make decisions for you if you were unable to make them for yourself, called a health care power of , and what kind of decisions you might make such as use of life sustaining treatments such as ventilators and tube feeding when faced with a life limiting illness recorded on a living will that they will need to know. (How you want to be cared for as you near the end of your natural life)     X  Patient has advanced directives on file, which we reviewed, and they do not wish to make changes.

## 2022-04-15 RX ORDER — SITAGLIPTIN AND METFORMIN HYDROCHLORIDE 1000; 50 MG/1; MG/1
TABLET, FILM COATED ORAL
Qty: 180 TABLET | Refills: 1 | Status: SHIPPED | OUTPATIENT
Start: 2022-04-15 | End: 2023-02-01

## 2022-04-15 NOTE — TELEPHONE ENCOUNTER
No new care gaps identified.  Powered by [employer]. Reference number: [id number].   4/15/2022 12:05:33 AM CDT

## 2022-04-15 NOTE — TELEPHONE ENCOUNTER
Refill Authorization Note   Katherine Rivers  is requesting a refill authorization.  Brief Assessment and Rationale for Refill:  Approve     Medication Therapy Plan:  FLOS;FOV;    Medication Reconciliation Completed: No   Comments:     No Care Gaps recommended.     Note composed:10:34 AM 04/15/2022

## 2022-04-29 ENCOUNTER — PATIENT OUTREACH (OUTPATIENT)
Dept: ADMINISTRATIVE | Facility: HOSPITAL | Age: 72
End: 2022-04-29
Payer: MEDICARE

## 2022-05-08 DIAGNOSIS — I10 ESSENTIAL (PRIMARY) HYPERTENSION: ICD-10-CM

## 2022-05-08 DIAGNOSIS — E11.9 TYPE 2 DIABETES MELLITUS WITHOUT COMPLICATIONS: ICD-10-CM

## 2022-05-09 RX ORDER — LISINOPRIL 5 MG/1
TABLET ORAL
Qty: 90 TABLET | Refills: 0 | Status: SHIPPED | OUTPATIENT
Start: 2022-05-09 | End: 2022-08-03

## 2022-05-09 NOTE — TELEPHONE ENCOUNTER
Refill Authorization Note   Katherine Rivers  is requesting a refill authorization.  Brief Assessment and Rationale for Refill:  Approve    -Medication-Related Problems Identified: Requires labs  Medication Therapy Plan:       Medication Reconciliation Completed: No   Comments:     Provider Staff:     Action is required for this patient.   Please see care gap opportunities below in Care Due Message.     Thanks!  [facility]     Appointments      Date Provider   Last Visit   2/22/2022 Jennifer Casillas MD   Next Visit   9/1/2022 Jennifer Casillas MD     Note composed:1:47 PM 05/09/2022           Note composed:1:47 PM 05/09/2022

## 2022-05-09 NOTE — TELEPHONE ENCOUNTER
Care Due:                  Date            Visit Type   Department     Provider  --------------------------------------------------------------------------------                                EP -                              PRIMARY      [facility] Jennifer Casillas  Last Visit: 02-      CARE ([facility])   MEDICINE       Anger                              EP -                              PRIMARY      [facility]    Jennifer Casillas  Next Visit: 09-      CARE ([facility])   MEDICINE       Anger                                                            Last  Test          Frequency    Reason                     Performed    Due Date  --------------------------------------------------------------------------------    CMP.........  12 months..  herrera MACARIO,     08- 08-                             lisinopriL...............    Lipid Panel.  12 months..  atorvastatin.............  08- 08-    [facility] Embedded Care Gaps. Reference number: [id number]. 5/08/2022   9:12:54 PM CDT

## 2022-05-10 RX ORDER — OMEPRAZOLE 40 MG/1
CAPSULE, DELAYED RELEASE ORAL
Qty: 90 CAPSULE | Refills: 3 | Status: SHIPPED | OUTPATIENT
Start: 2022-05-10 | End: 2023-04-13

## 2022-05-10 NOTE — TELEPHONE ENCOUNTER
No new care gaps identified.  [facility] Embedded Care Gaps. Reference number: [id number]. 5/09/2022   9:19:39 PM CDT

## 2022-05-10 NOTE — TELEPHONE ENCOUNTER
Refill Authorization Note   Katherine Rivers  is requesting a refill authorization.  Brief Assessment and Rationale for Refill:  Approve     Medication Therapy Plan:       Medication Reconciliation Completed: No   Comments:     No Care Gaps recommended.     Note composed:11:31 AM 05/10/2022

## 2022-05-18 ENCOUNTER — PATIENT OUTREACH (OUTPATIENT)
Dept: ADMINISTRATIVE | Facility: HOSPITAL | Age: 72
End: 2022-05-18
Payer: MEDICARE

## 2022-06-07 ENCOUNTER — PATIENT MESSAGE (OUTPATIENT)
Dept: FAMILY MEDICINE | Facility: CLINIC | Age: 72
End: 2022-06-07
Payer: MEDICARE

## 2022-06-10 RX ORDER — CHOLESTYRAMINE 4 G/9G
4 POWDER, FOR SUSPENSION ORAL 2 TIMES DAILY
Qty: 60 PACKET | Refills: 1 | Status: SHIPPED | OUTPATIENT
Start: 2022-06-10 | End: 2022-06-13

## 2022-06-13 ENCOUNTER — TELEPHONE (OUTPATIENT)
Dept: GASTROENTEROLOGY | Facility: CLINIC | Age: 72
End: 2022-06-13

## 2022-06-13 ENCOUNTER — OFFICE VISIT (OUTPATIENT)
Dept: GASTROENTEROLOGY | Facility: CLINIC | Age: 72
End: 2022-06-13
Payer: MEDICARE

## 2022-06-13 VITALS — HEIGHT: 65 IN | BODY MASS INDEX: 23.07 KG/M2 | WEIGHT: 138.44 LBS

## 2022-06-13 DIAGNOSIS — Z86.010 HISTORY OF COLON POLYPS: ICD-10-CM

## 2022-06-13 DIAGNOSIS — K52.9 CHRONIC DIARRHEA: Primary | ICD-10-CM

## 2022-06-13 DIAGNOSIS — R19.8 IRREGULAR BOWEL HABITS: ICD-10-CM

## 2022-06-13 DIAGNOSIS — K21.9 GASTROESOPHAGEAL REFLUX DISEASE WITHOUT ESOPHAGITIS: ICD-10-CM

## 2022-06-13 DIAGNOSIS — Z87.19 HISTORY OF GASTRITIS: ICD-10-CM

## 2022-06-13 DIAGNOSIS — Z90.49 S/P CHOLECYSTECTOMY: ICD-10-CM

## 2022-06-13 PROCEDURE — 99204 PR OFFICE/OUTPT VISIT, NEW, LEVL IV, 45-59 MIN: ICD-10-PCS | Mod: S$GLB,,, | Performed by: NURSE PRACTITIONER

## 2022-06-13 PROCEDURE — 3061F NEG MICROALBUMINURIA REV: CPT | Mod: CPTII,S$GLB,, | Performed by: NURSE PRACTITIONER

## 2022-06-13 PROCEDURE — 4010F ACE/ARB THERAPY RXD/TAKEN: CPT | Mod: CPTII,S$GLB,, | Performed by: NURSE PRACTITIONER

## 2022-06-13 PROCEDURE — 1126F AMNT PAIN NOTED NONE PRSNT: CPT | Mod: CPTII,S$GLB,, | Performed by: NURSE PRACTITIONER

## 2022-06-13 PROCEDURE — 1159F MED LIST DOCD IN RCRD: CPT | Mod: CPTII,S$GLB,, | Performed by: NURSE PRACTITIONER

## 2022-06-13 PROCEDURE — 3044F HG A1C LEVEL LT 7.0%: CPT | Mod: CPTII,S$GLB,, | Performed by: NURSE PRACTITIONER

## 2022-06-13 PROCEDURE — 1160F RVW MEDS BY RX/DR IN RCRD: CPT | Mod: CPTII,S$GLB,, | Performed by: NURSE PRACTITIONER

## 2022-06-13 PROCEDURE — 3066F NEPHROPATHY DOC TX: CPT | Mod: CPTII,S$GLB,, | Performed by: NURSE PRACTITIONER

## 2022-06-13 PROCEDURE — 1101F PR PT FALLS ASSESS DOC 0-1 FALLS W/OUT INJ PAST YR: ICD-10-PCS | Mod: CPTII,S$GLB,, | Performed by: NURSE PRACTITIONER

## 2022-06-13 PROCEDURE — 3008F PR BODY MASS INDEX (BMI) DOCUMENTED: ICD-10-PCS | Mod: CPTII,S$GLB,, | Performed by: NURSE PRACTITIONER

## 2022-06-13 PROCEDURE — 3288F PR FALLS RISK ASSESSMENT DOCUMENTED: ICD-10-PCS | Mod: CPTII,S$GLB,, | Performed by: NURSE PRACTITIONER

## 2022-06-13 PROCEDURE — 3044F PR MOST RECENT HEMOGLOBIN A1C LEVEL <7.0%: ICD-10-PCS | Mod: CPTII,S$GLB,, | Performed by: NURSE PRACTITIONER

## 2022-06-13 PROCEDURE — 3061F PR NEG MICROALBUMINURIA RESULT DOCUMENTED/REVIEW: ICD-10-PCS | Mod: CPTII,S$GLB,, | Performed by: NURSE PRACTITIONER

## 2022-06-13 PROCEDURE — 1159F PR MEDICATION LIST DOCUMENTED IN MEDICAL RECORD: ICD-10-PCS | Mod: CPTII,S$GLB,, | Performed by: NURSE PRACTITIONER

## 2022-06-13 PROCEDURE — 1126F PR PAIN SEVERITY QUANTIFIED, NO PAIN PRESENT: ICD-10-PCS | Mod: CPTII,S$GLB,, | Performed by: NURSE PRACTITIONER

## 2022-06-13 PROCEDURE — 1157F ADVNC CARE PLAN IN RCRD: CPT | Mod: CPTII,S$GLB,, | Performed by: NURSE PRACTITIONER

## 2022-06-13 PROCEDURE — 3008F BODY MASS INDEX DOCD: CPT | Mod: CPTII,S$GLB,, | Performed by: NURSE PRACTITIONER

## 2022-06-13 PROCEDURE — 99999 PR PBB SHADOW E&M-EST. PATIENT-LVL IV: CPT | Mod: PBBFAC,,, | Performed by: NURSE PRACTITIONER

## 2022-06-13 PROCEDURE — 99204 OFFICE O/P NEW MOD 45 MIN: CPT | Mod: S$GLB,,, | Performed by: NURSE PRACTITIONER

## 2022-06-13 PROCEDURE — 3066F PR DOCUMENTATION OF TREATMENT FOR NEPHROPATHY: ICD-10-PCS | Mod: CPTII,S$GLB,, | Performed by: NURSE PRACTITIONER

## 2022-06-13 PROCEDURE — 4010F PR ACE/ARB THEARPY RXD/TAKEN: ICD-10-PCS | Mod: CPTII,S$GLB,, | Performed by: NURSE PRACTITIONER

## 2022-06-13 PROCEDURE — 1157F PR ADVANCE CARE PLAN OR EQUIV PRESENT IN MEDICAL RECORD: ICD-10-PCS | Mod: CPTII,S$GLB,, | Performed by: NURSE PRACTITIONER

## 2022-06-13 PROCEDURE — 1101F PT FALLS ASSESS-DOCD LE1/YR: CPT | Mod: CPTII,S$GLB,, | Performed by: NURSE PRACTITIONER

## 2022-06-13 PROCEDURE — 99999 PR PBB SHADOW E&M-EST. PATIENT-LVL IV: ICD-10-PCS | Mod: PBBFAC,,, | Performed by: NURSE PRACTITIONER

## 2022-06-13 PROCEDURE — 3288F FALL RISK ASSESSMENT DOCD: CPT | Mod: CPTII,S$GLB,, | Performed by: NURSE PRACTITIONER

## 2022-06-13 PROCEDURE — 1160F PR REVIEW ALL MEDS BY PRESCRIBER/CLIN PHARMACIST DOCUMENTED: ICD-10-PCS | Mod: CPTII,S$GLB,, | Performed by: NURSE PRACTITIONER

## 2022-06-13 RX ORDER — COLESEVELAM 180 1/1
625 TABLET ORAL DAILY
Qty: 30 TABLET | Refills: 2 | Status: SHIPPED | OUTPATIENT
Start: 2022-06-13 | End: 2022-09-08 | Stop reason: SDUPTHER

## 2022-06-13 RX ORDER — DIPHENOXYLATE HYDROCHLORIDE AND ATROPINE SULFATE 2.5; .025 MG/1; MG/1
TABLET ORAL
COMMUNITY
Start: 2022-05-09 | End: 2022-09-08

## 2022-06-13 NOTE — PROGRESS NOTES
Subjective:       Patient ID: Katherine Rivers is a 72 y.o. female, Body mass index is 23.04 kg/m².    Chief Complaint: Diarrhea and Gastroesophageal Reflux      Patient is new to me. Established patient of Dr. Uribe & Vi Guzman NP.    Here with , whom assisted with interview.     Diarrhea   This is a chronic problem. The current episode started more than 1 year ago. The problem occurs 2 to 4 times per day (intermittent; occurs once per week). The problem has been gradually worsening. The stool consistency is described as watery (describes stool as type 4-7 on bristol scale; denies bloody stools). The patient states that diarrhea does not awaken her from sleep. Associated symptoms include abdominal pain (occ abdominal cramping; denies currently). Pertinent negatives include no bloating, chills, coughing, fever, increased  flatus, vomiting or weight loss. Associated symptoms comments: Associated symptoms also include fecal urgency and syncope. Nothing aggravates the symptoms. Risk factors include recent antibiotic use (denies recent hospitalization or foreign travel). Treatments tried: Past: Lomotil PRN- mild improvement; Currently: Probiotic once daily- helps; she did not start Questran prescribed by PCP due to cost of medication; taking her friend's Welchol 625 mg once daily- significant improvement. There is no history of bowel resection, inflammatory bowel disease, irritable bowel syndrome or a recent abdominal surgery. S/P cholecystectomy     Review of Systems   Constitutional: Negative for appetite change, chills, fever, unexpected weight change and weight loss.   HENT: Positive for hearing loss. Negative for trouble swallowing.    Respiratory: Negative for cough and shortness of breath.    Cardiovascular: Negative for chest pain.   Gastrointestinal: Positive for abdominal pain (occ abdominal cramping; denies currently), diarrhea and nausea. Negative for abdominal distention, anal bleeding,  bloating, blood in stool, constipation, flatus, rectal pain and vomiting.        Hx of GERD- well controlled taking Omeprazole 40 mg once daily and OTC Manju-[place] PRN   Genitourinary: Negative for difficulty urinating and dysuria.   Musculoskeletal: Negative for gait problem.   Skin: Negative for rash.   Neurological: Positive for syncope (chronic problem; instructed to follow-up with PCP). Negative for speech difficulty.   Psychiatric/Behavioral: Negative for confusion.       Past Medical History:   Diagnosis Date    Bilateral hearing loss     wears hearing aides    Cystocele     Diabetes mellitus     dx 57    Diverticular disease     General anesthetics causing adverse effect in therapeutic use     faints after surgery    GERD (gastroesophageal reflux disease)     H/O esophagogastroduodenoscopy     normal 3/16    Hyperlipidemia LDL goal < 100     Irritable bowel syndrome     Osteopenia     Dexa 4/11 and 7/15 adn 11/18      Past Surgical History:   Procedure Laterality Date    BELT ABDOMINOPLASTY      BILATERAL SALPINGOOPHORECTOMY      BLADDER SUSPENSION  2/23/12     x 3    CERVICAL POLYPECTOMY      CHOLECYSTECTOMY      COLONOSCOPY  1/2006    by Dr. lauren barcenas (report in media section) diverticulosis, otherwise normal findings, repeat in 10 years for screening    COLONOSCOPY N/A 3/8/2016    Procedure: COLONOSCOPY;  Surgeon: Erickson Uribe Jr., MD;  [facility];  Service: Endoscopy;  Laterality: N/A;    COLPORRHAPHY      dental implant      HYSTERECTOMY  2011    [facility]    OOPHORECTOMY  2011    bilat    TUBAL LIGATION      VAGINAL DELIVERY      times 2    VAGINAL HYSTERECTOMY W/ ANTERIOR AND POSTERIOR VAGINAL REPAIR  05/20/14      Family History   Problem Relation Age of Onset    Hyperlipidemia Mother     Diabetes Father         Type 1    Cancer Father         lung + tob    Cancer Maternal Aunt         breast cancer    Breast cancer Maternal Aunt 40    Ovarian cancer  Neg Hx       Wt Readings from Last 10 Encounters:   06/13/22 62.8 kg (138 lb 7.2 oz)   03/10/22 62.1 kg (137 lb)   03/07/22 62.3 kg (137 lb 5.6 oz)   02/22/22 62.8 kg (138 lb 5.4 oz)   11/09/21 60.6 kg (133 lb 9.6 oz)   09/14/21 60.6 kg (133 lb 9.6 oz)   08/19/21 61.7 kg (136 lb 0.4 oz)   02/04/21 65.5 kg (144 lb 4.7 oz)   03/17/20 64.7 kg (142 lb 10.2 oz)   09/24/19 64 kg (141 lb)     Lab Results   Component Value Date    WBC 8.37 08/06/2021    HGB 12.7 08/06/2021    HCT 40.1 08/06/2021    MCV 84 08/06/2021     08/06/2021     CMP  Sodium   Date Value Ref Range Status   08/06/2021 139 136 - 145 mmol/L Final     Potassium   Date Value Ref Range Status   08/06/2021 4.5 3.5 - 5.1 mmol/L Final     Chloride   Date Value Ref Range Status   08/06/2021 103 95 - 110 mmol/L Final     CO2   Date Value Ref Range Status   08/06/2021 28 23 - 29 mmol/L Final     Glucose   Date Value Ref Range Status   08/06/2021 118 (H) 70 - 110 mg/dL Final     BUN   Date Value Ref Range Status   08/06/2021 13 8 - 23 mg/dL Final     Creatinine   Date Value Ref Range Status   08/06/2021 0.8 0.5 - 1.4 mg/dL Final     Calcium   Date Value Ref Range Status   08/06/2021 10.1 8.7 - 10.5 mg/dL Final     Total Protein   Date Value Ref Range Status   08/06/2021 7.3 6.0 - 8.4 g/dL Final     Albumin   Date Value Ref Range Status   08/06/2021 4.1 3.5 - 5.2 g/dL Final     Total Bilirubin   Date Value Ref Range Status   08/06/2021 0.4 0.1 - 1.0 mg/dL Final     Comment:     For infants and newborns, interpretation of results should be based  on gestational age, weight and in agreement with clinical  observations.    Premature Infant recommended reference ranges:  Up to 24 hours.............<8.0 mg/dL  Up to 48 hours............<12.0 mg/dL  3-5 days..................<15.0 mg/dL  6-29 days.................<15.0 mg/dL       Alkaline Phosphatase   Date Value Ref Range Status   08/06/2021 55 55 - 135 U/L Final     AST   Date Value Ref Range Status   08/06/2021  19 10 - 40 U/L Final     ALT   Date Value Ref Range Status   08/06/2021 22 10 - 44 U/L Final     Anion Gap   Date Value Ref Range Status   08/06/2021 8 8 - 16 mmol/L Final     eGFR if    Date Value Ref Range Status   08/06/2021 >60.0 >60 mL/min/1.73 m^2 Final     eGFR if non    Date Value Ref Range Status   08/06/2021 >60.0 >60 mL/min/1.73 m^2 Final     Comment:     Calculation used to obtain the estimated glomerular filtration  rate (eGFR) is the CKD-EPI equation.        Lab Results   Component Value Date    TSH 1.937 08/06/2021            Reviewed prior medical records including endoscopy history (see surgical history).     Objective:      Physical Exam  Constitutional:       General: She is not in acute distress.     Appearance: She is well-developed.   HENT:      Head: Normocephalic.      Right Ear: Decreased hearing noted.      Left Ear: Decreased hearing noted.      Nose: Nose normal.      Mouth/Throat:      Mouth: No oral lesions.      Pharynx: Uvula midline.   Eyes:      General: Lids are normal.      Conjunctiva/sclera: Conjunctivae normal.      Pupils: Pupils are equal, round, and reactive to light.   Neck:      Trachea: Trachea normal.   Cardiovascular:      Rate and Rhythm: Normal rate and regular rhythm.      Heart sounds: Normal heart sounds. No murmur heard.  Pulmonary:      Effort: Pulmonary effort is normal. No respiratory distress.      Breath sounds: Normal breath sounds. No stridor. No wheezing.   Abdominal:      General: Bowel sounds are normal. There is no distension.      Palpations: Abdomen is soft. There is no mass.      Tenderness: There is no abdominal tenderness. There is no guarding or rebound.   Musculoskeletal:         General: Normal range of motion.      Cervical back: Normal range of motion.   Skin:     General: Skin is warm and dry.      Findings: No rash.      Comments: Non jaundiced   Neurological:      Mental Status: She is alert and oriented to  person, place, and time.   Psychiatric:         Speech: Speech normal.         Behavior: Behavior normal. Behavior is cooperative.           Assessment:       1. Chronic diarrhea    2. Irregular bowel habits    3. S/P cholecystectomy    4. Gastroesophageal reflux disease without esophagitis    5. History of gastritis    6. History of colon polyps           Plan:   All diagnoses and orders for this visit:    Chronic diarrhea, Irregular bowel habits & S/P cholecystectomy  - Stool Exam-Ova,Cysts,Parasites; Future; Expected date: 06/13/2022  - Giardia / Cryptosporidum, EIA; Future; Expected date: 06/13/2022  - Stool culture; Future; Expected date: 06/13/2022  - WBC, Stool; Future; Expected date: 06/13/2022  - Occult blood x 1, stool; Future; Expected date: 06/13/2022  - pH, stool; Future; Expected date: 06/13/2022  - Pancreatic elastase, fecal; Future; Expected date: 06/13/2022  - Clostridium difficile EIA; Future; Expected date: 06/13/2022  - Recommended increase fiber in diet, especially soluble fiber since this can help bulk up the stool consistency and may help to slow down how fast the stool goes through the colon and can prevent diarrhea  - Continue Probiotic once daily  - Continue Lomotil PRN  - Start: colesevelam (WELCHOL) 625 mg tablet; Take 1 tablet (625 mg total) by mouth once daily.  Dispense: 30 tablet; Refill: 2  - Schedule Colonoscopy    Gastroesophageal reflux disease without esophagitis & History of gastritis   - Continue Omeprazole 40 mg once daily   - Continue OTC Manju-[place] PRN   - Take PPI in the morning 30 minutes before breakfast   - Recommend to avoid large meals, avoid eating within 3 hours of bedtime, elevate head of bed if nocturnal symptoms are present, smoking cessation (if current smoker), & weight loss (if overweight).    - Recommend minimize/avoid high-fat foods, chocolate, caffeine, citrus, alcohol, & tomato products.   - Advised to avoid/limit use of NSAID's, since they can cause GI  upset, bleeding, and/or ulcers. If needed, take with food.     History of colon polyps   - Schedule Colonoscopy    If no improvement in symptoms or symptoms worsen, call/follow-up at clinic or go to ER

## 2022-06-13 NOTE — TELEPHONE ENCOUNTER
----- Message from [facility] sent at 6/13/2022  2:05 PM CDT -----  .Type:  Pharmacy Calling to Clarify an RX    Pharmacy Name: [facility]     Prescription Name: colesevelam (WELCHOL) 625 mg tablet    What do they need to clarify?: THE DOSAGE     Best Call Back Number: [phone]     Additional Information: NONE

## 2022-06-15 ENCOUNTER — LAB VISIT (OUTPATIENT)
Dept: LAB | Facility: HOSPITAL | Age: 72
End: 2022-06-15
Attending: FAMILY MEDICINE
Payer: MEDICARE

## 2022-06-15 DIAGNOSIS — K52.9 CHRONIC DIARRHEA: ICD-10-CM

## 2022-06-15 PROCEDURE — 87427 SHIGA-LIKE TOXIN AG IA: CPT | Performed by: NURSE PRACTITIONER

## 2022-06-15 PROCEDURE — 87209 SMEAR COMPLEX STAIN: CPT | Performed by: NURSE PRACTITIONER

## 2022-06-15 PROCEDURE — 87329 GIARDIA AG IA: CPT | Performed by: NURSE PRACTITIONER

## 2022-06-15 PROCEDURE — 87045 FECES CULTURE AEROBIC BACT: CPT | Performed by: NURSE PRACTITIONER

## 2022-06-15 PROCEDURE — 89055 LEUKOCYTE ASSESSMENT FECAL: CPT | Performed by: NURSE PRACTITIONER

## 2022-06-15 PROCEDURE — 83986 ASSAY PH BODY FLUID NOS: CPT | Performed by: NURSE PRACTITIONER

## 2022-06-15 PROCEDURE — 87046 STOOL CULTR AEROBIC BACT EA: CPT | Performed by: NURSE PRACTITIONER

## 2022-06-15 PROCEDURE — 87177 OVA AND PARASITES SMEARS: CPT | Performed by: NURSE PRACTITIONER

## 2022-06-15 PROCEDURE — 82272 OCCULT BLD FECES 1-3 TESTS: CPT | Performed by: NURSE PRACTITIONER

## 2022-06-15 PROCEDURE — 82656 EL-1 FECAL QUAL/SEMIQ: CPT | Performed by: NURSE PRACTITIONER

## 2022-06-16 LAB
CRYPTOSP AG STL QL IA: NEGATIVE
G LAMBLIA AG STL QL IA: NEGATIVE
OB PNL STL: NEGATIVE
WBC #/AREA STL HPF: NORMAL /[HPF]

## 2022-06-17 LAB
E COLI SXT1 STL QL IA: NEGATIVE
E COLI SXT2 STL QL IA: NEGATIVE
PH STL: 7 [PH] (ref 5–8.5)

## 2022-06-20 ENCOUNTER — TELEPHONE (OUTPATIENT)
Dept: GASTROENTEROLOGY | Facility: CLINIC | Age: 72
End: 2022-06-20
Payer: MEDICARE

## 2022-06-20 DIAGNOSIS — R19.5 YEAST IN STOOL: Primary | ICD-10-CM

## 2022-06-20 LAB
BACTERIA STL CULT: NORMAL
ELASTASE 1, FECAL: >500 MCG/G
O+P STL MICRO: NORMAL

## 2022-06-20 RX ORDER — NYSTATIN 500000 [USP'U]/1
500000 TABLET, COATED ORAL EVERY 8 HOURS
Qty: 21 TABLET | Refills: 0 | Status: SHIPPED | OUTPATIENT
Start: 2022-06-20 | End: 2022-06-27

## 2022-06-20 NOTE — TELEPHONE ENCOUNTER
Let patient know her stool studies showed yeast in stool; otherwise, unremarkable. Lab was not able to check for C Diff because the stool sample turned in was formed stool. If diarrhea persists, recommend rechecking stool for C Diff. Also, I sent a Rx to her pharmacy for the yeast in stool.

## 2022-07-21 ENCOUNTER — CLINICAL SUPPORT (OUTPATIENT)
Dept: UROGYNECOLOGY | Facility: CLINIC | Age: 72
End: 2022-07-21
Payer: MEDICARE

## 2022-07-21 ENCOUNTER — PATIENT MESSAGE (OUTPATIENT)
Dept: UROGYNECOLOGY | Facility: CLINIC | Age: 72
End: 2022-07-21
Payer: MEDICARE

## 2022-07-21 ENCOUNTER — PATIENT MESSAGE (OUTPATIENT)
Dept: UROGYNECOLOGY | Facility: CLINIC | Age: 72
End: 2022-07-21

## 2022-07-21 DIAGNOSIS — R35.0 URINARY FREQUENCY: Primary | ICD-10-CM

## 2022-07-21 LAB
BILIRUB SERPL-MCNC: NEGATIVE MG/DL
BLOOD URINE, POC: 250
CLARITY, POC UA: ABNORMAL
COLOR, POC UA: YELLOW
GLUCOSE UR QL STRIP: NEGATIVE
KETONES UR QL STRIP: NEGATIVE
LEUKOCYTE ESTERASE URINE, POC: ABNORMAL
NITRITE, POC UA: POSITIVE
PH, POC UA: 6
PROTEIN, POC: NEGATIVE
SPECIFIC GRAVITY, POC UA: 1
UROBILINOGEN, POC UA: NEGATIVE

## 2022-07-21 PROCEDURE — 81002 URINALYSIS NONAUTO W/O SCOPE: CPT | Mod: S$GLB,,, | Performed by: NURSE PRACTITIONER

## 2022-07-21 PROCEDURE — 99499 NO LOS: ICD-10-PCS | Mod: S$GLB,,, | Performed by: NURSE PRACTITIONER

## 2022-07-21 PROCEDURE — 87086 URINE CULTURE/COLONY COUNT: CPT | Performed by: NURSE PRACTITIONER

## 2022-07-21 PROCEDURE — 99499 UNLISTED E&M SERVICE: CPT | Mod: S$GLB,,, | Performed by: NURSE PRACTITIONER

## 2022-07-21 PROCEDURE — 87077 CULTURE AEROBIC IDENTIFY: CPT | Performed by: NURSE PRACTITIONER

## 2022-07-21 PROCEDURE — 81002 POCT URINE DIPSTICK WITHOUT MICROSCOPE: ICD-10-PCS | Mod: S$GLB,,, | Performed by: NURSE PRACTITIONER

## 2022-07-21 PROCEDURE — 87088 URINE BACTERIA CULTURE: CPT | Performed by: NURSE PRACTITIONER

## 2022-07-21 PROCEDURE — 87186 SC STD MICRODIL/AGAR DIL: CPT | Performed by: NURSE PRACTITIONER

## 2022-07-21 NOTE — PROGRESS NOTES
Pt dropped off urine specimen.  Complaining of dysuria and increased frequency and urgency.  UA is suspicious for UTI.  She will begin her macrodantin 100 mg bid x 5 days pending culture results.

## 2022-07-22 ENCOUNTER — PATIENT MESSAGE (OUTPATIENT)
Dept: FAMILY MEDICINE | Facility: CLINIC | Age: 72
End: 2022-07-22
Payer: MEDICARE

## 2022-07-24 LAB — BACTERIA UR CULT: ABNORMAL

## 2022-07-25 ENCOUNTER — PATIENT MESSAGE (OUTPATIENT)
Dept: UROGYNECOLOGY | Facility: CLINIC | Age: 72
End: 2022-07-25
Payer: MEDICARE

## 2022-07-26 RX ORDER — NITROFURANTOIN (MACROCRYSTALS) 100 MG/1
100 CAPSULE ORAL EVERY 12 HOURS
Qty: 10 CAPSULE | Refills: 0 | Status: SHIPPED | OUTPATIENT
Start: 2022-07-26 | End: 2022-07-31

## 2022-07-26 NOTE — TELEPHONE ENCOUNTER
The macrodantin should have cleared the infection.  Please see results note.  I'm sending in macrodantin for her to have on hand.

## 2022-08-08 ENCOUNTER — TELEPHONE (OUTPATIENT)
Dept: FAMILY MEDICINE | Facility: CLINIC | Age: 72
End: 2022-08-08
Payer: MEDICARE

## 2022-08-10 ENCOUNTER — TELEPHONE (OUTPATIENT)
Dept: FAMILY MEDICINE | Facility: CLINIC | Age: 72
End: 2022-08-10
Payer: MEDICARE

## 2022-08-11 ENCOUNTER — PATIENT MESSAGE (OUTPATIENT)
Dept: FAMILY MEDICINE | Facility: CLINIC | Age: 72
End: 2022-08-11
Payer: MEDICARE

## 2022-08-26 ENCOUNTER — LAB VISIT (OUTPATIENT)
Dept: LAB | Facility: HOSPITAL | Age: 72
End: 2022-08-26
Attending: FAMILY MEDICINE
Payer: MEDICARE

## 2022-08-26 DIAGNOSIS — E11.8 DIABETES MELLITUS TYPE 2 WITH COMPLICATIONS: ICD-10-CM

## 2022-08-26 DIAGNOSIS — E78.5 HYPERLIPIDEMIA ASSOCIATED WITH TYPE 2 DIABETES MELLITUS: ICD-10-CM

## 2022-08-26 DIAGNOSIS — E11.69 HYPERLIPIDEMIA ASSOCIATED WITH TYPE 2 DIABETES MELLITUS: ICD-10-CM

## 2022-08-26 DIAGNOSIS — I15.2 HYPERTENSION ASSOCIATED WITH DIABETES: ICD-10-CM

## 2022-08-26 DIAGNOSIS — E11.59 HYPERTENSION ASSOCIATED WITH DIABETES: ICD-10-CM

## 2022-08-26 LAB
ALBUMIN SERPL BCP-MCNC: 4.2 G/DL (ref 3.5–5.2)
ALP SERPL-CCNC: 55 U/L (ref 55–135)
ALT SERPL W/O P-5'-P-CCNC: 23 U/L (ref 10–44)
ANION GAP SERPL CALC-SCNC: 6 MMOL/L (ref 8–16)
AST SERPL-CCNC: 20 U/L (ref 10–40)
BASOPHILS # BLD AUTO: 0.07 K/UL (ref 0–0.2)
BASOPHILS NFR BLD: 0.8 % (ref 0–1.9)
BILIRUB SERPL-MCNC: 0.5 MG/DL (ref 0.1–1)
BUN SERPL-MCNC: 15 MG/DL (ref 8–23)
CALCIUM SERPL-MCNC: 9.9 MG/DL (ref 8.7–10.5)
CHLORIDE SERPL-SCNC: 103 MMOL/L (ref 95–110)
CHOLEST SERPL-MCNC: 142 MG/DL (ref 120–199)
CHOLEST/HDLC SERPL: 2.8 {RATIO} (ref 2–5)
CO2 SERPL-SCNC: 24 MMOL/L (ref 23–29)
CREAT SERPL-MCNC: 0.8 MG/DL (ref 0.5–1.4)
DIFFERENTIAL METHOD: ABNORMAL
EOSINOPHIL # BLD AUTO: 0.2 K/UL (ref 0–0.5)
EOSINOPHIL NFR BLD: 2.2 % (ref 0–8)
ERYTHROCYTE [DISTWIDTH] IN BLOOD BY AUTOMATED COUNT: 14.8 % (ref 11.5–14.5)
EST. GFR  (NO RACE VARIABLE): >60 ML/MIN/1.73 M^2
ESTIMATED AVG GLUCOSE: 137 MG/DL (ref 68–131)
GLUCOSE SERPL-MCNC: 126 MG/DL (ref 70–110)
HBA1C MFR BLD: 6.4 % (ref 4–5.6)
HCT VFR BLD AUTO: 38.1 % (ref 37–48.5)
HDLC SERPL-MCNC: 51 MG/DL (ref 40–75)
HDLC SERPL: 35.9 % (ref 20–50)
HGB BLD-MCNC: 12.4 G/DL (ref 12–16)
IMM GRANULOCYTES # BLD AUTO: 0.04 K/UL (ref 0–0.04)
IMM GRANULOCYTES NFR BLD AUTO: 0.4 % (ref 0–0.5)
LDLC SERPL CALC-MCNC: 73.4 MG/DL (ref 63–159)
LYMPHOCYTES # BLD AUTO: 3.8 K/UL (ref 1–4.8)
LYMPHOCYTES NFR BLD: 41.5 % (ref 18–48)
MCH RBC QN AUTO: 27.3 PG (ref 27–31)
MCHC RBC AUTO-ENTMCNC: 32.5 G/DL (ref 32–36)
MCV RBC AUTO: 84 FL (ref 82–98)
MONOCYTES # BLD AUTO: 0.7 K/UL (ref 0.3–1)
MONOCYTES NFR BLD: 7.8 % (ref 4–15)
NEUTROPHILS # BLD AUTO: 4.3 K/UL (ref 1.8–7.7)
NEUTROPHILS NFR BLD: 47.3 % (ref 38–73)
NONHDLC SERPL-MCNC: 91 MG/DL
NRBC BLD-RTO: 0 /100 WBC
PLATELET # BLD AUTO: 335 K/UL (ref 150–450)
PMV BLD AUTO: 10.3 FL (ref 9.2–12.9)
POTASSIUM SERPL-SCNC: 4.2 MMOL/L (ref 3.5–5.1)
PROT SERPL-MCNC: 7.1 G/DL (ref 6–8.4)
RBC # BLD AUTO: 4.54 M/UL (ref 4–5.4)
SODIUM SERPL-SCNC: 133 MMOL/L (ref 136–145)
TRIGL SERPL-MCNC: 88 MG/DL (ref 30–150)
TSH SERPL DL<=0.005 MIU/L-ACNC: 1.64 UIU/ML (ref 0.4–4)
WBC # BLD AUTO: 9.16 K/UL (ref 3.9–12.7)

## 2022-08-26 PROCEDURE — 83036 HEMOGLOBIN GLYCOSYLATED A1C: CPT | Performed by: FAMILY MEDICINE

## 2022-08-26 PROCEDURE — 36415 COLL VENOUS BLD VENIPUNCTURE: CPT | Mod: PO | Performed by: FAMILY MEDICINE

## 2022-08-26 PROCEDURE — 80053 COMPREHEN METABOLIC PANEL: CPT | Performed by: FAMILY MEDICINE

## 2022-08-26 PROCEDURE — 84443 ASSAY THYROID STIM HORMONE: CPT | Performed by: FAMILY MEDICINE

## 2022-08-26 PROCEDURE — 85025 COMPLETE CBC W/AUTO DIFF WBC: CPT | Performed by: FAMILY MEDICINE

## 2022-08-26 PROCEDURE — 80061 LIPID PANEL: CPT | Performed by: FAMILY MEDICINE

## 2022-08-27 ENCOUNTER — PATIENT MESSAGE (OUTPATIENT)
Dept: FAMILY MEDICINE | Facility: CLINIC | Age: 72
End: 2022-08-27
Payer: MEDICARE

## 2022-09-07 ENCOUNTER — PATIENT MESSAGE (OUTPATIENT)
Dept: UROGYNECOLOGY | Facility: CLINIC | Age: 72
End: 2022-09-07
Payer: MEDICARE

## 2022-09-07 RX ORDER — SULFAMETHOXAZOLE AND TRIMETHOPRIM 800; 160 MG/1; MG/1
1 TABLET ORAL DAILY
Qty: 90 TABLET | Refills: 2 | Status: SHIPPED | OUTPATIENT
Start: 2022-09-07 | End: 2022-12-06

## 2022-09-08 ENCOUNTER — PATIENT MESSAGE (OUTPATIENT)
Dept: FAMILY MEDICINE | Facility: CLINIC | Age: 72
End: 2022-09-08

## 2022-09-08 ENCOUNTER — OFFICE VISIT (OUTPATIENT)
Dept: FAMILY MEDICINE | Facility: CLINIC | Age: 72
End: 2022-09-08
Payer: MEDICARE

## 2022-09-08 ENCOUNTER — TELEPHONE (OUTPATIENT)
Dept: FAMILY MEDICINE | Facility: CLINIC | Age: 72
End: 2022-09-08

## 2022-09-08 VITALS
DIASTOLIC BLOOD PRESSURE: 82 MMHG | TEMPERATURE: 98 F | BODY MASS INDEX: 23.43 KG/M2 | OXYGEN SATURATION: 97 % | WEIGHT: 140.63 LBS | SYSTOLIC BLOOD PRESSURE: 144 MMHG | RESPIRATION RATE: 16 BRPM | HEIGHT: 65 IN | HEART RATE: 98 BPM

## 2022-09-08 DIAGNOSIS — E11.59 HYPERTENSION ASSOCIATED WITH DIABETES: ICD-10-CM

## 2022-09-08 DIAGNOSIS — Z12.39 ENCOUNTER FOR SCREENING FOR MALIGNANT NEOPLASM OF BREAST, UNSPECIFIED SCREENING MODALITY: ICD-10-CM

## 2022-09-08 DIAGNOSIS — E11.8 DIABETES MELLITUS TYPE 2 WITH COMPLICATIONS: Primary | ICD-10-CM

## 2022-09-08 DIAGNOSIS — K52.9 CHRONIC DIARRHEA: ICD-10-CM

## 2022-09-08 DIAGNOSIS — I15.2 HYPERTENSION ASSOCIATED WITH DIABETES: ICD-10-CM

## 2022-09-08 DIAGNOSIS — E78.5 HYPERLIPIDEMIA ASSOCIATED WITH TYPE 2 DIABETES MELLITUS: ICD-10-CM

## 2022-09-08 DIAGNOSIS — Z12.31 ENCOUNTER FOR SCREENING MAMMOGRAM FOR MALIGNANT NEOPLASM OF BREAST: ICD-10-CM

## 2022-09-08 DIAGNOSIS — E11.69 HYPERLIPIDEMIA ASSOCIATED WITH TYPE 2 DIABETES MELLITUS: ICD-10-CM

## 2022-09-08 PROCEDURE — 3077F SYST BP >= 140 MM HG: CPT | Mod: CPTII,S$GLB,, | Performed by: FAMILY MEDICINE

## 2022-09-08 PROCEDURE — 1159F PR MEDICATION LIST DOCUMENTED IN MEDICAL RECORD: ICD-10-PCS | Mod: CPTII,S$GLB,, | Performed by: FAMILY MEDICINE

## 2022-09-08 PROCEDURE — 3061F NEG MICROALBUMINURIA REV: CPT | Mod: CPTII,S$GLB,, | Performed by: FAMILY MEDICINE

## 2022-09-08 PROCEDURE — 1160F PR REVIEW ALL MEDS BY PRESCRIBER/CLIN PHARMACIST DOCUMENTED: ICD-10-PCS | Mod: CPTII,S$GLB,, | Performed by: FAMILY MEDICINE

## 2022-09-08 PROCEDURE — 99214 PR OFFICE/OUTPT VISIT, EST, LEVL IV, 30-39 MIN: ICD-10-PCS | Mod: 25,S$GLB,, | Performed by: FAMILY MEDICINE

## 2022-09-08 PROCEDURE — 90686 IIV4 VACC NO PRSV 0.5 ML IM: CPT | Mod: S$GLB,,, | Performed by: FAMILY MEDICINE

## 2022-09-08 PROCEDURE — 3061F PR NEG MICROALBUMINURIA RESULT DOCUMENTED/REVIEW: ICD-10-PCS | Mod: CPTII,S$GLB,, | Performed by: FAMILY MEDICINE

## 2022-09-08 PROCEDURE — 1101F PT FALLS ASSESS-DOCD LE1/YR: CPT | Mod: CPTII,S$GLB,, | Performed by: FAMILY MEDICINE

## 2022-09-08 PROCEDURE — 99214 OFFICE O/P EST MOD 30 MIN: CPT | Mod: 25,S$GLB,, | Performed by: FAMILY MEDICINE

## 2022-09-08 PROCEDURE — 3008F BODY MASS INDEX DOCD: CPT | Mod: CPTII,S$GLB,, | Performed by: FAMILY MEDICINE

## 2022-09-08 PROCEDURE — 3288F PR FALLS RISK ASSESSMENT DOCUMENTED: ICD-10-PCS | Mod: CPTII,S$GLB,, | Performed by: FAMILY MEDICINE

## 2022-09-08 PROCEDURE — 3288F FALL RISK ASSESSMENT DOCD: CPT | Mod: CPTII,S$GLB,, | Performed by: FAMILY MEDICINE

## 2022-09-08 PROCEDURE — 3077F PR MOST RECENT SYSTOLIC BLOOD PRESSURE >= 140 MM HG: ICD-10-PCS | Mod: CPTII,S$GLB,, | Performed by: FAMILY MEDICINE

## 2022-09-08 PROCEDURE — 3044F PR MOST RECENT HEMOGLOBIN A1C LEVEL <7.0%: ICD-10-PCS | Mod: CPTII,S$GLB,, | Performed by: FAMILY MEDICINE

## 2022-09-08 PROCEDURE — 1157F ADVNC CARE PLAN IN RCRD: CPT | Mod: CPTII,S$GLB,, | Performed by: FAMILY MEDICINE

## 2022-09-08 PROCEDURE — G0008 ADMIN INFLUENZA VIRUS VAC: HCPCS | Mod: S$GLB,,, | Performed by: FAMILY MEDICINE

## 2022-09-08 PROCEDURE — 3008F PR BODY MASS INDEX (BMI) DOCUMENTED: ICD-10-PCS | Mod: CPTII,S$GLB,, | Performed by: FAMILY MEDICINE

## 2022-09-08 PROCEDURE — 4010F ACE/ARB THERAPY RXD/TAKEN: CPT | Mod: CPTII,S$GLB,, | Performed by: FAMILY MEDICINE

## 2022-09-08 PROCEDURE — 1126F AMNT PAIN NOTED NONE PRSNT: CPT | Mod: CPTII,S$GLB,, | Performed by: FAMILY MEDICINE

## 2022-09-08 PROCEDURE — 3079F DIAST BP 80-89 MM HG: CPT | Mod: CPTII,S$GLB,, | Performed by: FAMILY MEDICINE

## 2022-09-08 PROCEDURE — G0008 FLU VACCINE (QUAD) GREATER THAN OR EQUAL TO 3YO PRESERVATIVE FREE IM: ICD-10-PCS | Mod: S$GLB,,, | Performed by: FAMILY MEDICINE

## 2022-09-08 PROCEDURE — 1157F PR ADVANCE CARE PLAN OR EQUIV PRESENT IN MEDICAL RECORD: ICD-10-PCS | Mod: CPTII,S$GLB,, | Performed by: FAMILY MEDICINE

## 2022-09-08 PROCEDURE — 1159F MED LIST DOCD IN RCRD: CPT | Mod: CPTII,S$GLB,, | Performed by: FAMILY MEDICINE

## 2022-09-08 PROCEDURE — 3066F NEPHROPATHY DOC TX: CPT | Mod: CPTII,S$GLB,, | Performed by: FAMILY MEDICINE

## 2022-09-08 PROCEDURE — 3044F HG A1C LEVEL LT 7.0%: CPT | Mod: CPTII,S$GLB,, | Performed by: FAMILY MEDICINE

## 2022-09-08 PROCEDURE — 1126F PR PAIN SEVERITY QUANTIFIED, NO PAIN PRESENT: ICD-10-PCS | Mod: CPTII,S$GLB,, | Performed by: FAMILY MEDICINE

## 2022-09-08 PROCEDURE — 4010F PR ACE/ARB THEARPY RXD/TAKEN: ICD-10-PCS | Mod: CPTII,S$GLB,, | Performed by: FAMILY MEDICINE

## 2022-09-08 PROCEDURE — 3066F PR DOCUMENTATION OF TREATMENT FOR NEPHROPATHY: ICD-10-PCS | Mod: CPTII,S$GLB,, | Performed by: FAMILY MEDICINE

## 2022-09-08 PROCEDURE — 1160F RVW MEDS BY RX/DR IN RCRD: CPT | Mod: CPTII,S$GLB,, | Performed by: FAMILY MEDICINE

## 2022-09-08 PROCEDURE — 3079F PR MOST RECENT DIASTOLIC BLOOD PRESSURE 80-89 MM HG: ICD-10-PCS | Mod: CPTII,S$GLB,, | Performed by: FAMILY MEDICINE

## 2022-09-08 PROCEDURE — 1101F PR PT FALLS ASSESS DOC 0-1 FALLS W/OUT INJ PAST YR: ICD-10-PCS | Mod: CPTII,S$GLB,, | Performed by: FAMILY MEDICINE

## 2022-09-08 PROCEDURE — 90686 FLU VACCINE (QUAD) GREATER THAN OR EQUAL TO 3YO PRESERVATIVE FREE IM: ICD-10-PCS | Mod: S$GLB,,, | Performed by: FAMILY MEDICINE

## 2022-09-08 RX ORDER — COLESEVELAM 180 1/1
625 TABLET ORAL DAILY
Qty: 30 TABLET | Refills: 3 | Status: SHIPPED | OUTPATIENT
Start: 2022-09-08 | End: 2023-02-21

## 2022-09-11 ENCOUNTER — PATIENT MESSAGE (OUTPATIENT)
Dept: FAMILY MEDICINE | Facility: CLINIC | Age: 72
End: 2022-09-11
Payer: MEDICARE

## 2022-09-11 NOTE — PROGRESS NOTES
Subjective:       Patient ID: Katherine Rivers is a 72 y.o. female.    Chief Complaint: Follow-up    HPI  The patient is a 72-year-old who is here today for follow-up.  She tells me that she is doing good really good although she woke up with the sore throat that is more an irritation.    Today we discussed the followin)  Chronic diarrhea.  She did see the GI team for her chronic diarrhea.  Lab test were unremarkable and she is scheduled for a colonoscopy.  She was started on Welchol and she is taking half in the morning with breakfast and half at lunch.  She tells me that this medicine has really helped a lot and has done wonders for me.  She has not had diarrhea in 2 months.  She can now go places and has the luxury of not having to worry where the bathroom as  2) hypertension.  Today her blood pressure is 144/82.  She is taking lisinopril.  Her blood pressure is always high here but good at home  3) diabetes.  Her recent A1c was 6.4.  She is taking Janumet which she is tolerating well.  She will be having her diabetic eye exam soon.  4) hyperlipidemia.  She is doing well her Lipitor (and now Welchol for the chronic diarrhea).  Her recent total cholesterol was 142, her triglycerides are 88 (down from prior value 127) and her LDL was 73     Regarding her sore throat, that is new this morning.  She describes it as an irritation.  She denies any sinus congestion, rhinorrhea, postnasal drainage, cough, fevers or chills, myalgias.          Review of Systems   Constitutional:  Negative for appetite change, chills, diaphoresis, fatigue, fever and unexpected weight change.   HENT:  Positive for hearing loss. Negative for congestion, ear pain, postnasal drip, rhinorrhea, sinus pressure, sneezing, sore throat and trouble swallowing.    Eyes:  Negative for pain, discharge and visual disturbance.   Respiratory:  Negative for cough, chest tightness, shortness of breath and wheezing.    Cardiovascular:  Negative for  chest pain, palpitations and leg swelling.   Gastrointestinal:  Negative for abdominal distention, abdominal pain, blood in stool, constipation, diarrhea, nausea and vomiting.   Skin:  Negative for rash.     Objective:      Physical Exam  Constitutional:       General: She is not in acute distress.     Appearance: Normal appearance. She is well-developed.   HENT:      Head: Normocephalic and atraumatic.      Right Ear: Tympanic membrane, ear canal and external ear normal. Decreased hearing noted.      Left Ear: Tympanic membrane, ear canal and external ear normal. Decreased hearing noted.      Nose: Nose normal.      Mouth/Throat:      Mouth: No oral lesions.      Pharynx: No oropharyngeal exudate or posterior oropharyngeal erythema.   Eyes:      General: Lids are normal. No scleral icterus.     Extraocular Movements: Extraocular movements intact.      Conjunctiva/sclera: Conjunctivae normal.      Pupils: Pupils are equal, round, and reactive to light.   Neck:      Thyroid: No thyroid mass or thyromegaly.      Vascular: No carotid bruit.   Cardiovascular:      Rate and Rhythm: Normal rate and regular rhythm. No extrasystoles are present.     Chest Wall: PMI is not displaced.      Heart sounds: Normal heart sounds. No murmur heard.    No gallop.   Pulmonary:      Effort: Pulmonary effort is normal. No accessory muscle usage or respiratory distress.      Breath sounds: Normal breath sounds.   Abdominal:      General: Bowel sounds are normal. There is no abdominal bruit.      Palpations: Abdomen is soft.      Tenderness: There is no abdominal tenderness. There is no rebound.   Musculoskeletal:      Cervical back: Normal range of motion and neck supple.   Lymphadenopathy:      Head:      Right side of head: No submental or submandibular adenopathy.      Left side of head: No submental or submandibular adenopathy.      Cervical:      Right cervical: No superficial, deep or posterior cervical adenopathy.     Left  "cervical: No superficial, deep or posterior cervical adenopathy.      Upper Body:      Right upper body: No supraclavicular adenopathy.      Left upper body: No supraclavicular adenopathy.   Skin:     General: Skin is warm and dry.   Neurological:      Mental Status: She is alert and oriented to person, place, and time.     Blood pressure (!) 144/82, pulse 98, temperature 98.2 °F (36.8 °C), temperature source Temporal, resp. rate 16, height 5' 5" (1.651 m), weight 63.8 kg (140 lb 10.5 oz), last menstrual period 05/23/2012, SpO2 97 %.Body mass index is 23.41 kg/m².            A/P:  1)  Chronic diarrhea.  Improved with welchol.  Continue with Welchol.  Follow-up for colonoscopy as planned   2) hypertension.  Well controlled on home readings.  Continue with lisinopril.  If her home readings are consistently higher than 135/85, she will let me know    3) diabetes.  Well controlled.  Continue with Janumet.  Follow-up with eye doctor for diabetic eye exam.  Recheck A1c in 6 months  with urine microalbumin  4) hyperlipidemia.  Well controlled with improved triglyceride levels recently likely due to Welchol addition.  Continue with Lipitor and Welchol.  Recheck labs in 1 year  5) sore throat.  New.  Her history and exam are unremarkable today.  If her sore throat continues or worsens, she will let me know    6) health maintenance issues.  She will receive her flu shot today.  We will schedule her mammogram.      As long as she does well, I will see her back in 6 months with an A1c and urine microalbumin at that time    "

## 2022-09-22 ENCOUNTER — PATIENT MESSAGE (OUTPATIENT)
Dept: GASTROENTEROLOGY | Facility: CLINIC | Age: 72
End: 2022-09-22
Payer: MEDICARE

## 2022-09-26 ENCOUNTER — PATIENT MESSAGE (OUTPATIENT)
Dept: ADMINISTRATIVE | Facility: HOSPITAL | Age: 72
End: 2022-09-26
Payer: MEDICARE

## 2022-09-26 ENCOUNTER — PATIENT OUTREACH (OUTPATIENT)
Dept: ADMINISTRATIVE | Facility: HOSPITAL | Age: 72
End: 2022-09-26
Payer: MEDICARE

## 2022-09-26 NOTE — PROGRESS NOTES
Uncontrolled BP REPORT  BP Readings from Last 3 Encounters:   09/08/22 (!) 144/82   03/10/22 123/71   03/07/22 133/78       Non-compliant report chart audits for HYPERTENSION MANAGEMENT     Outreach to patient in reference to hypertension management       NEED REMOTE HOME BP READING DOCUMENTED   OR  BP FOLLOW UP WITH NURSE VISIT OR CARE TEAM MEMBER

## 2022-09-27 VITALS — DIASTOLIC BLOOD PRESSURE: 74 MMHG | SYSTOLIC BLOOD PRESSURE: 127 MMHG

## 2022-09-27 NOTE — TELEPHONE ENCOUNTER
Non-compliant report chart audits for HYPERTENSION MANAGEMENT     Outreach to patient in reference to hypertension management    Remote BP reading documented.  See PORTAL MESSAGE    Uncontrolled BP REPORT  BP Readings from Last 3 Encounters:   09/27/22 127/74   09/08/22 (!) 144/82   03/10/22 123/71

## 2022-10-17 NOTE — H&P
History & Physical - Short Stay  Gastroenterology      SUBJECTIVE:     Procedure: Colonoscopy    Chief Complaint/Indication for Procedure: Diarrhea.  Hx of colon polyps.    History of Present Illness:    See recent GI OV note:  Office Visit  6/13/2022  [facility]  Anastasiya Roberts NP  Gastroenterology Chronic diarrhea +5 more  Dx Diarrhea  Gastroesophageal Reflux; Referred by Aaareferral Self  Reason for Visit     Progress Notes    Anastasiya Roberts NP at 6/13/2022 11:30 AM    Status: Signed   Subjective:       Patient ID: Katherine Rivers is a 72 y.o. female, Body mass index is 23.04 kg/m².     Chief Complaint: Diarrhea and Gastroesophageal Reflux        Patient is new to me. Established patient of Dr. Uribe & Vi Guzman NP.     Here with , whom assisted with interview.      Diarrhea   This is a chronic problem. The current episode started more than 1 year ago. The problem occurs 2 to 4 times per day (intermittent; occurs once per week). The problem has been gradually worsening. The stool consistency is described as watery (describes stool as type 4-7 on bristol scale; denies bloody stools). The patient states that diarrhea does not awaken her from sleep. Associated symptoms include abdominal pain (occ abdominal cramping; denies currently). Pertinent negatives include no bloating, chills, coughing, fever, increased  flatus, vomiting or weight loss. Associated symptoms comments: Associated symptoms also include fecal urgency and syncope. Nothing aggravates the symptoms. Risk factors include recent antibiotic use (denies recent hospitalization or foreign travel). Treatments tried: Past: Lomotil PRN- mild improvement; Currently: Probiotic once daily- helps; she did not start Questran prescribed by PCP due to cost of medication; taking her friend's Welchol 625 mg once daily- significant improvement. There is no history of bowel resection, inflammatory bowel disease, irritable bowel syndrome  or a recent abdominal surgery. S/P cholecystectomy      Review of Systems   Constitutional: Negative for appetite change, chills, fever, unexpected weight change and weight loss.   HENT: Positive for hearing loss. Negative for trouble swallowing.    Respiratory: Negative for cough and shortness of breath.    Cardiovascular: Negative for chest pain.   Gastrointestinal: Positive for abdominal pain (occ abdominal cramping; denies currently), diarrhea and nausea. Negative for abdominal distention, anal bleeding, bloating, blood in stool, constipation, flatus, rectal pain and vomiting.        Hx of GERD- well controlled taking Omeprazole 40 mg once daily and OTC Manju-Osceola PRN       Assessment:       1. Chronic diarrhea    2. Irregular bowel habits    3. S/P cholecystectomy    4. Gastroesophageal reflux disease without esophagitis    5. History of gastritis    6. History of colon polyps           Plan:   All diagnoses and orders for this visit:     Chronic diarrhea, Irregular bowel habits & S/P cholecystectomy  - Stool Exam-Ova,Cysts,Parasites; Future; Expected date: 06/13/2022  - Giardia / Cryptosporidum, EIA; Future; Expected date: 06/13/2022  - Stool culture; Future; Expected date: 06/13/2022  - WBC, Stool; Future; Expected date: 06/13/2022  - Occult blood x 1, stool; Future; Expected date: 06/13/2022  - pH, stool; Future; Expected date: 06/13/2022  - Pancreatic elastase, fecal; Future; Expected date: 06/13/2022  - Clostridium difficile EIA; Future; Expected date: 06/13/2022  - Recommended increase fiber in diet, especially soluble fiber since this can help bulk up the stool consistency and may help to slow down how fast the stool goes through the colon and can prevent diarrhea  - Continue Probiotic once daily  - Continue Lomotil PRN  - Start: colesevelam (WELCHOL) 625 mg tablet; Take 1 tablet (625 mg total) by mouth once daily.  Dispense: 30 tablet; Refill: 2  - Schedule Colonoscopy     Gastroesophageal reflux  disease without esophagitis & History of gastritis              - Continue Omeprazole 40 mg once daily              - Continue OTC Manju-Wilson PRN              - Take PPI in the morning 30 minutes before breakfast              - Recommend to avoid large meals, avoid eating within 3 hours of bedtime, elevate head of bed if nocturnal symptoms are present, smoking cessation (if current smoker), & weight loss (if overweight).               - Recommend minimize/avoid high-fat foods, chocolate, caffeine, citrus, alcohol, & tomato products.              - Advised to avoid/limit use of NSAID's, since they can cause GI upset, bleeding, and/or ulcers. If needed, take with food.      History of colon polyps              - Schedule Colonoscopy             And see recent PCP's OV nite:  Office Visit  2022  [facility] - [facility]  Jennifer Casillas MD  Family Medicine Diabetes mellitus type 2 with complications +5 more  Dx Follow-up  Reason for Visit     Progress Notes    Jennifer Casillas MD at 2022  8:50 AM    Status: Signed   Subjective:       Patient ID: Katheirne Rivers is a 72 y.o. female.     Chief Complaint: Follow-up     HPI  The patient is a 72-year-old who is here today for follow-up.  She tells me that she is doing good really good although she woke up with the sore throat that is more an irritation.     Today we discussed the followin)  Chronic diarrhea.  She did see the GI team for her chronic diarrhea.  Lab test were unremarkable and she is scheduled for a colonoscopy.  She was started on Welchol and she is taking half in the morning with breakfast and half at lunch.  She tells me that this medicine has really helped a lot and has done wonders for me.  She has not had diarrhea in 2 months.  She can now go places and has the luxury of not having to worry where the bathroom as  2) hypertension.  Today her blood pressure is 144/82.  She is taking lisinopril.  Her blood pressure is always  high here but good at home  3) diabetes.  Her recent A1c was 6.4.  She is taking Janumet which she is tolerating well.  She will be having her diabetic eye exam soon.  4) hyperlipidemia.  She is doing well her Lipitor (and now Welchol for the chronic diarrhea).  Her recent total cholesterol was 142, her triglycerides are 88 (down from prior value 127) and her LDL was 73      Regarding her sore throat, that is new this morning.  She describes it as an irritation.  She denies any sinus congestion, rhinorrhea, postnasal drainage, cough, fevers or chills, myalgias.           Review of Systems   Constitutional:  Negative for appetite change, chills, diaphoresis, fatigue, fever and unexpected weight change.   HENT:  Positive for hearing loss. Negative for congestion, ear pain, postnasal drip, rhinorrhea, sinus pressure, sneezing, sore throat and trouble swallowing.    Eyes:  Negative for pain, discharge and visual disturbance.   Respiratory:  Negative for cough, chest tightness, shortness of breath and wheezing.    Cardiovascular:  Negative for chest pain, palpitations and leg swelling.   Gastrointestinal:  Negative for abdominal distention, abdominal pain, blood in stool, constipation, diarrhea, nausea and vomiting.     A/P:  1)  Chronic diarrhea.  Improved with welchol.  Continue with Welchol.  Follow-up for colonoscopy as planned   2) hypertension.  Well controlled on home readings.  Continue with lisinopril.  If her home readings are consistently higher than 135/85, she will let me know    3) diabetes.  Well controlled.  Continue with Janumet.  Follow-up with eye doctor for diabetic eye exam.  Recheck A1c in 6 months  with urine microalbumin  4) hyperlipidemia.  Well controlled with improved triglyceride levels recently likely due to Welchol addition.  Continue with Lipitor and Welchol.  Recheck labs in 1 year  5) sore throat.  New.  Her history and exam are unremarkable today.  If her sore throat continues or  worsens, she will let me know    6) health maintenance issues.  She will receive her flu shot today.  We will schedule her mammogram.       As long as she does well, I will see her back in 6 months with an A1c and urine microalbumin at that time               See most recent  Colonoscopy   Indications:         Screening for colorectal malignant neoplasm, Last                        colonoscopy ~10 years ago (Dr. Mcmahon)   Comorbidities        Type 2 diabetes mellitus, Hypercholesterolemia   Impression:          - One 1 mm polyp in the distal ascending colon.                        Resected and retrieved.                        - Diverticulosis in the sigmoid colon, in the                        descending colon, at the hepatic flexure and in the                        ascending colon.                        - Internal hemorrhoids.                        - The examination was otherwise normal.                        - The examined portion of the ileum was normal.   Recommendation:      - Discharge patient to home.                        - Await pathology results.                        - If the pathology report reveals adenomatous                        tissue, then repeat the colonoscopy for surveillance in 5 years.                        - If the pathology report indicates hyperplastic                        polyp, then repeat colonoscopy for surveillance in 7-8 years.                        - High fiber diet.                        - Take a PROBIOTIC, such as a carton of GREEK YOGURT                        (Chobani or Oikos, or Activia or Dannon); or tablets                        of ALIGN or CULTURELLE or FRANCIS-Q (all                        non-prescription), every day for a month.                        - Call the G.I. clinic in 2 weeks for reports (if                        you haven't heard from us sooner) [phone].                        - Continue present medications.                        - Return to  normal activities tomorrow.   Erickson Uribe MD   3/8/2016    ASCENDING COLON, POLYP, POLYPECTOMY:   -Tubular adenoma.      PTA Medications   Medication Sig    ascorbate calcium, vitamin C, (VITAMIN C, ASCORBATE CALCIUM,) 814 mg/gram Powd Take 1 packet by mouth once daily.    atorvastatin (LIPITOR) 20 MG tablet TAKE 1 TABLET BY MOUTH  DAILY    colesevelam (WELCHOL) 625 mg tablet Take 1 tablet (625 mg total) by mouth once daily.    estradioL (VAGIFEM) 10 mcg Tab PLACE 1 INSERT VAGINALLY  TWICE WEEKLY    JANUMET 50-1,000 mg per tablet TAKE 1 TABLET BY MOUTH 2 (TWO) TIMES DAILY WITH A MEAL.    L. ACIDOPHILUS/BIFID. ANIMALIS (ONE-A-DAY TRUBIOTICS ORAL) Take 1 tablet by mouth once daily.    lisinopriL (PRINIVIL,ZESTRIL) 5 MG tablet TAKE 1 TABLET BY MOUTH ONCE DAILY (Patient taking differently: every evening.)    MULTIVITAMIN WITH MINERALS (HAIR,SKIN & NAILS ORAL) Take 1 tablet by mouth 2 (two) times daily.    omeprazole (PRILOSEC) 40 MG capsule TAKE 1 CAPSULE BY MOUTH  ONCE DAILY    tretinoin (RETIN-A) 0.025 % cream Apply topically every evening.    blood sugar diagnostic Strp For daily and prn checks    blood-glucose meter (BLOOD GLUCOSE MONITORING) kit Use as instructed    lancets (ONETOUCH ULTRASOFT LANCETS) [facility] Pt to check blood sugar 2x daily.    nitrofurantoin, macrocrystal-monohydrate, (MACROBID) 100 MG capsule Take 1 capsule by mouth 2 (two) times daily as needed.    sulfamethoxazole-trimethoprim 800-160mg (BACTRIM DS) 800-160 mg Tab Take 1 tablet by mouth once daily. (Patient not taking: No sig reported)    UNABLE TO FIND medication name: Uqora - daily - states only takes two of the components       Review of patient's allergies indicates:   Allergen Reactions    No known drug allergies         Past Medical History:   Diagnosis Date    Bilateral hearing loss     wears hearing aides    Cystocele     Diabetes mellitus     dx 57    Diverticular disease     General anesthetics causing adverse effect in  "therapeutic use     faints after surgery    GERD (gastroesophageal reflux disease)     H/O esophagogastroduodenoscopy     normal 3/16    Hyperlipidemia LDL goal < 100     Hypertension     Irritable bowel syndrome     Osteopenia     Dexa 4/11 and 7/15 adn 11/18     Past Surgical History:   Procedure Laterality Date    BELT ABDOMINOPLASTY      BILATERAL SALPINGOOPHORECTOMY      BLADDER SUSPENSION  2/23/12     x 3    CERVICAL POLYPECTOMY      CHOLECYSTECTOMY      COLONOSCOPY  1/2006    by Dr. lauren barcenas (report in media section) diverticulosis, otherwise normal findings, repeat in 10 years for screening    COLONOSCOPY N/A 3/8/2016    Procedure: COLONOSCOPY;  Surgeon: Erickson Uribe Jr., MD;  Location: [facility];  Service: Endoscopy;  Laterality: N/A;    COLPORRHAPHY      dental implant      HYSTERECTOMY  2011    [facility] BSO    OOPHORECTOMY  2011    bilat    TUBAL LIGATION      VAGINAL DELIVERY      times 2    VAGINAL HYSTERECTOMY W/ ANTERIOR AND POSTERIOR VAGINAL REPAIR  05/20/14     Family History   Problem Relation Age of Onset    Hyperlipidemia Mother     Diabetes Father         Type 1    Cancer Father         lung + tob    Cancer Maternal Aunt         breast cancer    Breast cancer Maternal Aunt 40    Ovarian cancer Neg Hx      Social History     Tobacco Use    Smoking status: Never    Smokeless tobacco: Never   Substance Use Topics    Alcohol use: Yes     Alcohol/week: 0.8 standard drinks     Types: 1 Standard drinks or equivalent per week     Comment: rarely     Drug use: No         OBJECTIVE:     Vital Signs (Most Recent)  Temp: 97.3 °F (36.3 °C) (10/18/22 0755)  Pulse: 80 (10/18/22 0755)  Resp: 16 (10/18/22 0755)  BP: (!) 151/67 (10/18/22 0755)  SpO2: 99 % (10/18/22 0755)    Physical Exam:  :  Ht: 5' 5" (165.1 cm)   Wt: 61.2 kg (135 lb)   BMI: 22.47 kg/m²     .                                                 GENERAL:  Comfortable, in no acute distress.                                 HEENT EXAM:  Nonicteric. "  No adenopathy.  Oropharynx is clear.               NECK:  Supple.                                                               LUNGS:  Clear.                                                               CARDIAC:  Regular rate and rhythm.  S1, S2.  No murmur.                      ABDOMEN:  Soft, positive bowel sounds, nontender.  No hepatosplenomegaly or masses.  No rebound or guarding.                                             EXTREMITIES:  No edema.     MENTAL STATUS:  Alert and oriented.    ASSESSMENT/PLAN:     Assessment: Diarrhea.  Hx of colon polyps.    Plan: Colonoscopy    Anesthesia Plan:   MAC / General Anaesthesia    ASA Grade: ASA 2 - Patient with mild systemic disease with no functional limitations    MALLAMPATI SCORE: I (soft palate, uvula, fauces, and tonsillar pillars visible)

## 2022-10-18 ENCOUNTER — HOSPITAL ENCOUNTER (OUTPATIENT)
Facility: HOSPITAL | Age: 72
Discharge: HOME | End: 2022-10-18
Attending: INTERNAL MEDICINE | Admitting: INTERNAL MEDICINE
Payer: MEDICARE

## 2022-10-18 ENCOUNTER — ANESTHESIA (OUTPATIENT)
Dept: ENDOSCOPY | Facility: HOSPITAL | Age: 72
End: 2022-10-18
Payer: MEDICARE

## 2022-10-18 ENCOUNTER — ANESTHESIA EVENT (OUTPATIENT)
Dept: ENDOSCOPY | Facility: HOSPITAL | Age: 72
End: 2022-10-18
Payer: MEDICARE

## 2022-10-18 VITALS
WEIGHT: 135 LBS | DIASTOLIC BLOOD PRESSURE: 65 MMHG | HEART RATE: 70 BPM | SYSTOLIC BLOOD PRESSURE: 115 MMHG | TEMPERATURE: 97 F | BODY MASS INDEX: 22.49 KG/M2 | OXYGEN SATURATION: 96 % | RESPIRATION RATE: 18 BRPM | HEIGHT: 65 IN

## 2022-10-18 DIAGNOSIS — R19.7 DIARRHEA: ICD-10-CM

## 2022-10-18 PROBLEM — Z86.0100 HISTORY OF COLON POLYPS: Status: ACTIVE | Noted: 2022-10-18

## 2022-10-18 PROBLEM — Z86.010 HISTORY OF COLON POLYPS: Status: ACTIVE | Noted: 2022-10-18

## 2022-10-18 LAB — GLUCOSE SERPL-MCNC: 149 MG/DL (ref 70–110)

## 2022-10-18 PROCEDURE — D9220A PRA ANESTHESIA: Mod: PT,ANES,, | Performed by: ANESTHESIOLOGY

## 2022-10-18 PROCEDURE — 87046 STOOL CULTR AEROBIC BACT EA: CPT | Performed by: INTERNAL MEDICINE

## 2022-10-18 PROCEDURE — 87177 OVA AND PARASITES SMEARS: CPT | Performed by: INTERNAL MEDICINE

## 2022-10-18 PROCEDURE — 88305 TISSUE EXAM BY PATHOLOGIST: CPT | Mod: 59 | Performed by: PATHOLOGY

## 2022-10-18 PROCEDURE — 45385 COLONOSCOPY W/LESION REMOVAL: CPT | Mod: PT,,, | Performed by: INTERNAL MEDICINE

## 2022-10-18 PROCEDURE — 45380 PR COLONOSCOPY,BIOPSY: ICD-10-PCS | Mod: PT,59,, | Performed by: INTERNAL MEDICINE

## 2022-10-18 PROCEDURE — 87324 CLOSTRIDIUM AG IA: CPT | Performed by: INTERNAL MEDICINE

## 2022-10-18 PROCEDURE — 37000009 HC ANESTHESIA EA ADD 15 MINS: Mod: PO | Performed by: INTERNAL MEDICINE

## 2022-10-18 PROCEDURE — 63600175 PHARM REV CODE 636 W HCPCS: Mod: PO | Performed by: NURSE ANESTHETIST, CERTIFIED REGISTERED

## 2022-10-18 PROCEDURE — 63600175 PHARM REV CODE 636 W HCPCS: Mod: PO | Performed by: INTERNAL MEDICINE

## 2022-10-18 PROCEDURE — D9220A PRA ANESTHESIA: ICD-10-PCS | Mod: PT,CRNA,, | Performed by: NURSE ANESTHETIST, CERTIFIED REGISTERED

## 2022-10-18 PROCEDURE — 87045 FECES CULTURE AEROBIC BACT: CPT | Performed by: INTERNAL MEDICINE

## 2022-10-18 PROCEDURE — 37000008 HC ANESTHESIA 1ST 15 MINUTES: Mod: PO | Performed by: INTERNAL MEDICINE

## 2022-10-18 PROCEDURE — 45380 COLONOSCOPY AND BIOPSY: CPT | Mod: PT,59,, | Performed by: INTERNAL MEDICINE

## 2022-10-18 PROCEDURE — 88305 TISSUE EXAM BY PATHOLOGIST: CPT | Mod: 26,,, | Performed by: PATHOLOGY

## 2022-10-18 PROCEDURE — 27201012 HC FORCEPS, HOT/COLD, DISP: Mod: PO | Performed by: INTERNAL MEDICINE

## 2022-10-18 PROCEDURE — 45385 COLONOSCOPY W/LESION REMOVAL: CPT | Mod: PT,PO | Performed by: INTERNAL MEDICINE

## 2022-10-18 PROCEDURE — 45380 COLONOSCOPY AND BIOPSY: CPT | Mod: PT,59,PO | Performed by: INTERNAL MEDICINE

## 2022-10-18 PROCEDURE — 87209 SMEAR COMPLEX STAIN: CPT | Performed by: INTERNAL MEDICINE

## 2022-10-18 PROCEDURE — 45385 PR COLONOSCOPY,REMV LESN,SNARE: ICD-10-PCS | Mod: PT,,, | Performed by: INTERNAL MEDICINE

## 2022-10-18 PROCEDURE — D9220A PRA ANESTHESIA: ICD-10-PCS | Mod: PT,ANES,, | Performed by: ANESTHESIOLOGY

## 2022-10-18 PROCEDURE — D9220A PRA ANESTHESIA: Mod: PT,CRNA,, | Performed by: NURSE ANESTHETIST, CERTIFIED REGISTERED

## 2022-10-18 PROCEDURE — 25000003 PHARM REV CODE 250: Mod: PO | Performed by: NURSE ANESTHETIST, CERTIFIED REGISTERED

## 2022-10-18 PROCEDURE — 87427 SHIGA-LIKE TOXIN AG IA: CPT | Mod: 59 | Performed by: INTERNAL MEDICINE

## 2022-10-18 PROCEDURE — 27201089 HC SNARE, DISP (ANY): Mod: PO | Performed by: INTERNAL MEDICINE

## 2022-10-18 PROCEDURE — 88305 TISSUE EXAM BY PATHOLOGIST: ICD-10-PCS | Mod: 26,,, | Performed by: PATHOLOGY

## 2022-10-18 RX ORDER — PROPOFOL 10 MG/ML
VIAL (ML) INTRAVENOUS
Status: DISCONTINUED | OUTPATIENT
Start: 2022-10-18 | End: 2022-10-18

## 2022-10-18 RX ORDER — SODIUM CHLORIDE 0.9 % (FLUSH) 0.9 %
10 SYRINGE (ML) INJECTION
Status: DISCONTINUED | OUTPATIENT
Start: 2022-10-18 | End: 2022-10-18 | Stop reason: HOSPADM

## 2022-10-18 RX ORDER — SODIUM CHLORIDE, SODIUM LACTATE, POTASSIUM CHLORIDE, CALCIUM CHLORIDE 600; 310; 30; 20 MG/100ML; MG/100ML; MG/100ML; MG/100ML
INJECTION, SOLUTION INTRAVENOUS CONTINUOUS
Status: DISCONTINUED | OUTPATIENT
Start: 2022-10-18 | End: 2022-10-18 | Stop reason: HOSPADM

## 2022-10-18 RX ORDER — LIDOCAINE HYDROCHLORIDE 20 MG/ML
INJECTION INTRAVENOUS
Status: DISCONTINUED | OUTPATIENT
Start: 2022-10-18 | End: 2022-10-18

## 2022-10-18 RX ADMIN — PROPOFOL 50 MG: 10 INJECTION, EMULSION INTRAVENOUS at 08:10

## 2022-10-18 RX ADMIN — PROPOFOL 50 MG: 10 INJECTION, EMULSION INTRAVENOUS at 09:10

## 2022-10-18 RX ADMIN — PROPOFOL 100 MG: 10 INJECTION, EMULSION INTRAVENOUS at 08:10

## 2022-10-18 RX ADMIN — SODIUM CHLORIDE, SODIUM LACTATE, POTASSIUM CHLORIDE, AND CALCIUM CHLORIDE: .6; .31; .03; .02 INJECTION, SOLUTION INTRAVENOUS at 07:10

## 2022-10-18 RX ADMIN — LIDOCAINE HYDROCHLORIDE 50 MG: 20 INJECTION INTRAVENOUS at 08:10

## 2022-10-18 NOTE — TRANSFER OF CARE
"Anesthesia Transfer of Care Note    Patient: Katherine Rivers    Procedure(s) Performed: Procedure(s) (LRB):  COLONOSCOPY (N/A)    Patient location: PACU    Anesthesia Type: general    Transport from OR: Transported from OR on room air with adequate spontaneous ventilation    Post pain: adequate analgesia    Post assessment: no apparent anesthetic complications and tolerated procedure well    Post vital signs: stable    Level of consciousness: sedated    Nausea/Vomiting: no nausea/vomiting    Complications: none    Transfer of care protocol was followed      Last vitals:   Visit Vitals  BP (!) 151/67   Pulse 80   Temp 36.3 °C (97.3 °F)   Resp 16   Ht 5' 5" (1.651 m)   Wt 61.2 kg (135 lb)   LMP 05/23/2012   SpO2 99%   Breastfeeding No   BMI 22.47 kg/m²     "

## 2022-10-18 NOTE — ANESTHESIA PREPROCEDURE EVALUATION
10/18/2022  Katherine Rivers is a 72 y.o., female.      Pre-op Assessment    I have reviewed the NPO Status.   I have reviewed the Medications.     Review of Systems  Anesthesia Hx:  No problems with previous Anesthesia    Social:  Non-Smoker    EENT/Dental:   Bilateral hearing aids   Cardiovascular:   Exercise tolerance: good Hypertension    Pulmonary:  Pulmonary Normal    Hepatic/GI:   Bowel Prep. GERD    Neurological:  Neurology Normal    Endocrine:   Diabetes, type 2        Physical Exam  General: Well nourished        Anesthesia Plan  Type of Anesthesia, risks & benefits discussed:    Anesthesia Type: Gen Natural Airway  Intra-op Monitoring Plan: Standard ASA Monitors  Induction:  IV  Informed Consent: Informed consent signed with the Patient and all parties understand the risks and agree with anesthesia plan.  All questions answered. Patient consented to blood products? No  ASA Score: 3    Ready For Surgery From Anesthesia Perspective.     .

## 2022-10-18 NOTE — PATIENT INSTRUCTIONS
Recovery After Procedural Sedation (Adult)   You have been given medicine by vein to make you sleep during your surgery. This may have included both a pain medicine and sleeping medicine. Most of the effects have worn off. But you may still have some drowsiness for the next 6 to 8 hours.  Home care  Follow these guidelines when you get home:  For the next 8 hours, you should be watched by a responsible adult. This person should make sure your condition is not getting worse.  Don't drink any alcohol for the next 24 hours.  Don't drive, operate dangerous machinery, or make important business or personal decisions during the next 24 hours.  To prevent injury or falls, use caution when standing and walking for at least 24 hours after your procedure.  Note: Your healthcare provider may tell you not to take any medicine by mouth for pain or sleep in the next 4 hours. These medicines may react with the medicines you were given in the hospital. This could cause a much stronger response than usual.  Follow-up care  Follow up with your healthcare provider if you are not alert and back to your usual level of activity within 12 hours.  When to seek medical advice  Call your healthcare provider right away if any of these occur:  Drowsiness gets worse  Weakness or dizziness gets worse  Repeated vomiting  You can't be awakened  Fever  New rash  [employer] last reviewed this educational content on 9/1/2019  © 6439-5902 [employer]. [address] [place] [address], [place] [address]. All rights reserved. This information is not intended as a substitute for professional medical care. Always follow your healthcare professional's instructions         PROBIOTICS:  Now that your colon is so cleaned out, now is a good time for a round of PROBIOTICS.   Take a  Probiotic product such as Align or Culturelle or Azalia-Q, every day for a month.                  (The products listed are non-prescription, but you may need to ask the pharmacist  for their location.)   Repeat this at least 5-6 times a year.         High-Fiber Diet  Fiber is in fruits, vegetables, cereals, and grains. Fiber passes through your body undigested. A high-fiber diet helps food move through your intestinal tract. The added bulk is helpful in preventing constipation. In people with diverticulosis, fiber helps clean out the pouches along the colon wall. It also prevents new pouches from forming. A high-fiber diet reduces the risk of colon cancer. It also lowers blood cholesterol and prevents high blood sugar in people with diabetes.    The fiber-rich foods listed below should be part of your diet. If you are not used to high-fiber foods, start with 1 or 2 foods from this list. Every 3 to 4 days add a new one to your diet. Do this until you are eating 4 high-fiber foods per day. This should give you 20 to 35 grams of fiber a day. It is also important to drink a lot of water when you are on this diet. You should have 6 to 8 glasses of water a day. Water makes the fiber swell and increases the benefit.  Foods high in dietary fiber  The following foods are high in dietary fiber:  Breads. Breads made with 100% whole-wheat flour; lavon, wheat, or rye crackers; whole-grain tortillas, bran muffins.  Cereals. Whole-grain and bran cereals with bran (shredded wheat, wheat flakes, raisin bran, corn bran); oatmeal, rolled oats, granola, and brown rice.  Fruits. Fresh fruits and their edible skins (pears, prunes, raisins, berries, apples, and apricots); bananas, citrus fruit, mangoes, pineapple; and prune juice.  Nuts. Any nuts and seeds.  Vegetables. Best served raw or lightly cooked. All types, especially: green peas, celery, eggplant, potatoes, spinach, broccoli, Butner sprouts, winter squash, carrots, cauliflower, soybeans, lentils, and fresh and dried beans of all kinds.  Other. Popcorn, any spices.  Date Last Reviewed: 8/1/2016  © 1669-8465 [employer]. [address] [place] [address]  Road, [place] [address]. All rights reserved. This information is not intended as a substitute for professional medical care. Always follow your healthcare professional's instructions.

## 2022-10-18 NOTE — [employer] PATIENT INSTRUCTIONS
Discharge Summary/Instructions for after Colonoscopy with   Biopsy/Polypectomy  Katherine Rivers    Tuesday, October 18, 2022  Erickson Uribe MD  RESTRICTIONS ON ACTIVITY:  - Do not drive a car or operate machinery until the day after the procedure.      - The following day: return to full activity including work.  - For  3 days: No heavy lifting, straining or running.  - Diet: You can have solid foods, but no gassy foods (i.e. beans, broccoli,   cabbage, etc).  TREATMENT FOR COMMON SIDE EFFECTS:  - Mild abdominal pain and bloating or excessive gas: rest, eat lightly and   use a heating pad.  SYMPTOMS TO WATCH FOR AND REPORT TO YOUR PHYSICIAN:  1. Severe abdominal pain.  2. Fever within 24 hours after a procedure.  3. A large amount of rectal bleeding. (A small amount of blood from the   rectum is not serious, especially if hemorrhoids are present.  3.  Because air was put into your colon during the procedure, expelling   large amounts of air from your rectum is normal.  4.  You may not have a bowel movement for 1-3 days because of the   colonoscopy prep.  This is normal.  5.  Call immediately if you notice any of the following:   Chills and/or fever over 101   Persistent vomiting   Severe abdominal pain, other than gas cramps   Severe chest pain   Black, tarry stools   Any bleeding - exceeding one tablespoon  Your doctor recommends these additional instructions:  We are waiting for your pathology and culture results.   Eat a high fiber diet.   Take a fiber supplement, for example Citrucel, Fibercon, Konsyl or   Metamucil.   Take a PROBIOTIC, such as ALIGN or CULTURELLE or FRANCIS-Q (all   non-prescription), every day for a month.   And repeat this at least 5-6   times a year.  Call the G.I. clinic in 2 weeks for reports (if you haven't heard from us   sooner)  [phone].  Your physician has recommended a repeat colonoscopy in 5-6 years for   surveillance.  None  If you have any questions or problems, please call  your physician.  EMERGENCY PHONE NUMBER: [phone]  LAB RESULTS: Call in two (2) weeks for lab results, [phone]  ___________________________________________  Nurse Signature  ___________________________________________  Patient/Designated Responsible Party Signature  Erickson Uribe MD  10/18/2022 9:27:34 AM  This report has been verified and signed electronically.  Dear patient,  As a result of recent federal legislation (The Federal Cures Act), you may   receive lab or pathology results from your procedure in your [facility]   account before your physician is able to contact you. Your physician or   their representative will relay the results to you with their   recommendations at their soonest availability.  Thank you.  [employer]

## 2022-10-18 NOTE — ANESTHESIA POSTPROCEDURE EVALUATION
Anesthesia Post Evaluation    Patient: Katherine Rivers    Procedure(s) Performed: Procedure(s) (LRB):  COLONOSCOPY (N/A)    Final Anesthesia Type: general      Patient location during evaluation: PACU  Patient participation: Yes- Able to Participate  Level of consciousness: awake and alert  Post-procedure vital signs: reviewed and stable  Pain management: adequate  Airway patency: patent    PONV status at discharge: No PONV  Anesthetic complications: no      Cardiovascular status: blood pressure returned to baseline  Respiratory status: unassisted and room air  Hydration status: euvolemic  Follow-up not needed.          Vitals Value Taken Time   /65 10/18/22 0920   Temp 36.3 °C (97.3 °F) 10/18/22 0910   Pulse 70 10/18/22 0920   Resp 18 10/18/22 0920   SpO2 96 % 10/18/22 0920         Event Time   Out of Recovery 09:42:39         Pain/Steve Score: Steve Score: 10 (10/18/2022  9:40 AM)

## 2022-10-18 NOTE — BRIEF OP NOTE
Discharge Note  Short Stay      SUMMARY     Admit Date: 10/18/2022    Attending Physician: Erickson Uribe Jr., MD     Discharge Physician: Erickson Uribe Jr., MD    Discharge Date: 10/18/2022 9:29 AM    Final Diagnosis: Diarrhea, unspecified type [R19.7]  History of colon polyps [Z86.010]    Impression:            - One <2 mm polyp in the cecum, removed with a                          cold snare. Resected and retrieved.                          - Diverticulosis in the sigmoid colon and in the                          distal descending colon.                          - Diverticulosis in the transverse colon, at the                          hepatic flexure and in the ascending colon.                          - Irritable bowel syndrome.                          - Non-bleeding internal hemorrhoids.                          - The examination was otherwise normal.                          - The rectum and recto-sigmoid colon are normal.                          Fluid aspiration performed. Biopsied.   Recommendation:        - Discharge patient to home.                          - Await pathology and culture results.                          - Repeat colonoscopy in 5 years for surveillance.                          - High fiber diet.                          - Use fiber, for example Citrucel, Fibercon,                          Konsyl or Metamucil.                          - Take a PROBIOTIC, such as ALIGN or CULTURELLE or                          FRANCIS-Q (all non-prescription), every day for a                          month.                          - Call the G.I. clinic in 2 weeks for reports (if                          you haven't heard from us sooner) [phone].                          - Continue present medications.                          - Patient has a contact number available for                          emergencies. The signs and symptoms of potential                          delayed complications were  discussed with the                          patient. Return to normal activities tomorrow.                          Written discharge instructions were provided to                          the patient.                          - Return to normal activities tomorrow.   Erickson Uribe MD   10/18/2022       Disposition: HOME OR SELF CARE    Patient Instructions:   Current Discharge Medication List        CONTINUE these medications which have NOT CHANGED    Details   ascorbate calcium, vitamin C, (VITAMIN C, ASCORBATE CALCIUM,) 814 mg/gram Powd Take 1 packet by mouth once daily.      atorvastatin (LIPITOR) 20 MG tablet TAKE 1 TABLET BY MOUTH  DAILY  Qty: 90 tablet, Refills: 0    Comments: Requesting 1 year supply      colesevelam (WELCHOL) 625 mg tablet Take 1 tablet (625 mg total) by mouth once daily.  Qty: 30 tablet, Refills: 3    Associated Diagnoses: Chronic diarrhea      estradioL (VAGIFEM) 10 mcg Tab PLACE 1 INSERT VAGINALLY  TWICE WEEKLY  Qty: 24 tablet, Refills: 3    Comments: Requesting 1 year supply      JANUMET 50-1,000 mg per tablet TAKE 1 TABLET BY MOUTH 2 (TWO) TIMES DAILY WITH A MEAL.  Qty: 180 tablet, Refills: 1      L. ACIDOPHILUS/BIFID. ANIMALIS (ONE-A-DAY TRUBIOTICS ORAL) Take 1 tablet by mouth once daily.      lisinopriL (PRINIVIL,ZESTRIL) 5 MG tablet TAKE 1 TABLET BY MOUTH ONCE DAILY  Qty: 90 tablet, Refills: 1    Comments: Requesting 1 year supply  Associated Diagnoses: Type 2 diabetes mellitus without complications; Essential (primary) hypertension      MULTIVITAMIN WITH MINERALS (HAIR,SKIN & NAILS ORAL) Take 1 tablet by mouth 2 (two) times daily.      omeprazole (PRILOSEC) 40 MG capsule TAKE 1 CAPSULE BY MOUTH  ONCE DAILY  Qty: 90 capsule, Refills: 3    Comments: Requesting 1 year supply      tretinoin (RETIN-A) 0.025 % cream Apply topically every evening.  Qty: 45 g, Refills: 1      blood sugar diagnostic Strp For daily and prn checks  Qty: 100 strip, Refills: prn    Comments: Provide  lancets with strips      blood-glucose meter (BLOOD GLUCOSE MONITORING) kit Use as instructed  Qty: 1 each, Refills: 0      lancets (ONETOUCH ULTRASOFT LANCETS) [facility] [place] to check blood sugar 2x daily.  Qty: 100 each, Refills: 5    Associated Diagnoses: Diabetes mellitus type 2 with complications      nitrofurantoin, macrocrystal-monohydrate, (MACROBID) 100 MG capsule Take 1 capsule by mouth 2 (two) times daily as needed.      sulfamethoxazole-trimethoprim 800-160mg (BACTRIM DS) 800-160 mg Tab Take 1 tablet by mouth once daily.  Qty: 90 tablet, Refills: 2      UNABLE TO FIND medication name: Uqora - daily - states only takes two of the components             Discharge Procedure Orders (must include Diet, Follow-up, Activity)    Follow Up:  Follow up with PCP as per your routine.  Please follow a high fiber diet.  Activity as tolerated.    No driving day of procedure.    PROBIOTICS:  Now that your colon is so cleaned out, now is a good time for a round of PROBIOTICS.   Take a  Probiotic product such as Align or Culturelle or Azalia-Q, every day for a month.                  (The products listed are non-prescription, but you may need to ask the pharmacist for their location.)   Repeat this at least 5-6 times a year.

## 2022-10-19 LAB
C DIFF GDH STL QL: NEGATIVE
C DIFF TOX A+B STL QL IA: NEGATIVE

## 2022-10-20 LAB
E COLI SXT1 STL QL IA: NEGATIVE
E COLI SXT2 STL QL IA: NEGATIVE

## 2022-10-21 LAB — O+P STL MICRO: NORMAL

## 2022-10-22 LAB — BACTERIA STL CULT: NORMAL

## 2022-10-24 LAB
COMMENT: NORMAL
FINAL PATHOLOGIC DIAGNOSIS: NORMAL
GROSS: NORMAL
Lab: NORMAL

## 2022-10-26 LAB
LEFT EYE DM RETINOPATHY: NEGATIVE
RIGHT EYE DM RETINOPATHY: NEGATIVE

## 2022-10-28 ENCOUNTER — PATIENT MESSAGE (OUTPATIENT)
Dept: FAMILY MEDICINE | Facility: CLINIC | Age: 72
End: 2022-10-28
Payer: MEDICARE

## 2022-10-28 ENCOUNTER — HOSPITAL ENCOUNTER (OUTPATIENT)
Dept: RADIOLOGY | Facility: HOSPITAL | Age: 72
Discharge: HOME | End: 2022-10-28
Attending: FAMILY MEDICINE
Payer: MEDICARE

## 2022-10-28 DIAGNOSIS — Z12.31 ENCOUNTER FOR SCREENING MAMMOGRAM FOR MALIGNANT NEOPLASM OF BREAST: ICD-10-CM

## 2022-10-28 DIAGNOSIS — Z12.39 ENCOUNTER FOR SCREENING FOR MALIGNANT NEOPLASM OF BREAST, UNSPECIFIED SCREENING MODALITY: ICD-10-CM

## 2022-10-28 PROCEDURE — 77067 MAMMO DIGITAL SCREENING BILAT WITH TOMO: ICD-10-PCS | Mod: 26,,, | Performed by: RADIOLOGY

## 2022-10-28 PROCEDURE — 77067 SCR MAMMO BI INCL CAD: CPT | Mod: 26,,, | Performed by: RADIOLOGY

## 2022-10-28 PROCEDURE — 77063 BREAST TOMOSYNTHESIS BI: CPT | Mod: 26,,, | Performed by: RADIOLOGY

## 2022-10-28 PROCEDURE — 77063 BREAST TOMOSYNTHESIS BI: CPT | Mod: TC,PO

## 2022-10-28 PROCEDURE — 77063 MAMMO DIGITAL SCREENING BILAT WITH TOMO: ICD-10-PCS | Mod: 26,,, | Performed by: RADIOLOGY

## 2022-10-28 PROCEDURE — 77067 SCR MAMMO BI INCL CAD: CPT | Mod: TC,PO

## 2022-12-25 ENCOUNTER — PATIENT MESSAGE (OUTPATIENT)
Dept: GASTROENTEROLOGY | Facility: CLINIC | Age: 72
End: 2022-12-25
Payer: MEDICARE

## 2022-12-25 DIAGNOSIS — K58.0 IRRITABLE BOWEL SYNDROME WITH DIARRHEA: Primary | ICD-10-CM

## 2022-12-26 ENCOUNTER — PATIENT MESSAGE (OUTPATIENT)
Dept: FAMILY MEDICINE | Facility: CLINIC | Age: 72
End: 2022-12-26
Payer: MEDICARE

## 2022-12-26 DIAGNOSIS — K52.9 CHRONIC DIARRHEA: Primary | ICD-10-CM

## 2022-12-27 RX ORDER — DICYCLOMINE HYDROCHLORIDE 10 MG/1
10 CAPSULE ORAL
Qty: 120 CAPSULE | Refills: 0 | Status: SHIPPED | OUTPATIENT
Start: 2022-12-27 | End: 2023-01-23

## 2022-12-27 RX ORDER — DIPHENOXYLATE HYDROCHLORIDE AND ATROPINE SULFATE 2.5; .025 MG/1; MG/1
1 TABLET ORAL 4 TIMES DAILY PRN
Qty: 30 TABLET | Refills: 0 | Status: SHIPPED | OUTPATIENT
Start: 2022-12-27 | End: 2023-01-06

## 2022-12-27 NOTE — TELEPHONE ENCOUNTER
Let her know that I do not typically prescribe Amitriptyline for IBS. Recommend a trial of Bentyl until appointment.

## 2022-12-28 RX ORDER — SUCRALFATE 1 G/1
1 TABLET ORAL 4 TIMES DAILY
Qty: 120 TABLET | Refills: 0 | Status: SHIPPED | OUTPATIENT
Start: 2022-12-28 | End: 2023-03-09

## 2022-12-28 NOTE — TELEPHONE ENCOUNTER
Pt walked in to clinic today.  Lomotil has not helped and she was turned away at urgent care.  She is not scheduled until January for GI.  States that she would like to see if carafate could be sent in for her.  Please advise.

## 2023-01-26 ENCOUNTER — PES CALL (OUTPATIENT)
Dept: ADMINISTRATIVE | Facility: CLINIC | Age: 73
End: 2023-01-26
Payer: MEDICARE

## 2023-01-30 LAB
LEFT EYE DM RETINOPATHY: POSITIVE
RIGHT EYE DM RETINOPATHY: POSITIVE

## 2023-02-01 RX ORDER — SITAGLIPTIN AND METFORMIN HYDROCHLORIDE 1000; 50 MG/1; MG/1
TABLET, FILM COATED ORAL
Qty: 180 TABLET | Refills: 1 | Status: SHIPPED | OUTPATIENT
Start: 2023-02-01 | End: 2023-07-30

## 2023-02-01 NOTE — TELEPHONE ENCOUNTER
No new care gaps identified.  [facility] Embedded Care Gaps. Reference number: [id number]. 2/01/2023   1:20:38 AM CST

## 2023-02-01 NOTE — TELEPHONE ENCOUNTER
Refill Decision Note   Katherine Brendamino  is requesting a refill authorization.  Brief Assessment and Rationale for Refill:  Approve     Medication Therapy Plan:       Medication Reconciliation Completed: No   Comments:     No Care Gaps recommended.     Note composed:2:58 AM 02/01/2023

## 2023-02-21 DIAGNOSIS — K52.9 CHRONIC DIARRHEA: ICD-10-CM

## 2023-02-21 RX ORDER — COLESEVELAM 180 1/1
625 TABLET ORAL DAILY
Qty: 90 TABLET | Refills: 1 | Status: SHIPPED | OUTPATIENT
Start: 2023-02-21 | End: 2023-06-24

## 2023-02-21 NOTE — TELEPHONE ENCOUNTER
Refill Decision Note   Katherinemanju Rivers  is requesting a refill authorization.  Brief Assessment and Rationale for Refill:  Approve     Medication Therapy Plan:       Medication Reconciliation Completed: No   Comments:     No Care Gaps recommended.     Note composed:2:31 PM 02/21/2023

## 2023-02-21 NOTE — TELEPHONE ENCOUNTER
No new care gaps identified.  [facility] Embedded Care Gaps. Reference number: [id number]. 2/21/2023   12:14:18 AM CST

## 2023-03-01 ENCOUNTER — LAB VISIT (OUTPATIENT)
Dept: LAB | Facility: HOSPITAL | Age: 73
End: 2023-03-01
Attending: FAMILY MEDICINE
Payer: MEDICARE

## 2023-03-01 DIAGNOSIS — E11.8 DIABETES MELLITUS TYPE 2 WITH COMPLICATIONS: ICD-10-CM

## 2023-03-01 PROCEDURE — 83036 HEMOGLOBIN GLYCOSYLATED A1C: CPT | Performed by: FAMILY MEDICINE

## 2023-03-01 PROCEDURE — 36415 COLL VENOUS BLD VENIPUNCTURE: CPT | Mod: PO | Performed by: FAMILY MEDICINE

## 2023-03-02 LAB
ESTIMATED AVG GLUCOSE: 131 MG/DL (ref 68–131)
HBA1C MFR BLD: 6.2 % (ref 4–5.6)

## 2023-03-03 ENCOUNTER — PATIENT MESSAGE (OUTPATIENT)
Dept: FAMILY MEDICINE | Facility: CLINIC | Age: 73
End: 2023-03-03
Payer: MEDICARE

## 2023-03-09 ENCOUNTER — OFFICE VISIT (OUTPATIENT)
Dept: FAMILY MEDICINE | Facility: CLINIC | Age: 73
End: 2023-03-09
Payer: MEDICARE

## 2023-03-09 VITALS
TEMPERATURE: 98 F | OXYGEN SATURATION: 99 % | BODY MASS INDEX: 22.35 KG/M2 | WEIGHT: 134.13 LBS | DIASTOLIC BLOOD PRESSURE: 68 MMHG | RESPIRATION RATE: 20 BRPM | SYSTOLIC BLOOD PRESSURE: 116 MMHG | HEIGHT: 65 IN | HEART RATE: 97 BPM

## 2023-03-09 DIAGNOSIS — E11.59 HYPERTENSION ASSOCIATED WITH DIABETES: ICD-10-CM

## 2023-03-09 DIAGNOSIS — E11.69 HYPERLIPIDEMIA ASSOCIATED WITH TYPE 2 DIABETES MELLITUS: ICD-10-CM

## 2023-03-09 DIAGNOSIS — E11.8 DIABETES MELLITUS TYPE 2 WITH COMPLICATIONS: Primary | ICD-10-CM

## 2023-03-09 DIAGNOSIS — I15.2 HYPERTENSION ASSOCIATED WITH DIABETES: ICD-10-CM

## 2023-03-09 DIAGNOSIS — E78.5 HYPERLIPIDEMIA ASSOCIATED WITH TYPE 2 DIABETES MELLITUS: ICD-10-CM

## 2023-03-09 DIAGNOSIS — K58.9 IRRITABLE BOWEL SYNDROME, UNSPECIFIED TYPE: ICD-10-CM

## 2023-03-09 PROCEDURE — 3288F FALL RISK ASSESSMENT DOCD: CPT | Mod: CPTII,S$GLB,, | Performed by: FAMILY MEDICINE

## 2023-03-09 PROCEDURE — 3074F PR MOST RECENT SYSTOLIC BLOOD PRESSURE < 130 MM HG: ICD-10-PCS | Mod: CPTII,S$GLB,, | Performed by: FAMILY MEDICINE

## 2023-03-09 PROCEDURE — 4010F ACE/ARB THERAPY RXD/TAKEN: CPT | Mod: CPTII,S$GLB,, | Performed by: FAMILY MEDICINE

## 2023-03-09 PROCEDURE — 1101F PT FALLS ASSESS-DOCD LE1/YR: CPT | Mod: CPTII,S$GLB,, | Performed by: FAMILY MEDICINE

## 2023-03-09 PROCEDURE — 3044F PR MOST RECENT HEMOGLOBIN A1C LEVEL <7.0%: ICD-10-PCS | Mod: CPTII,S$GLB,, | Performed by: FAMILY MEDICINE

## 2023-03-09 PROCEDURE — 1159F PR MEDICATION LIST DOCUMENTED IN MEDICAL RECORD: ICD-10-PCS | Mod: CPTII,S$GLB,, | Performed by: FAMILY MEDICINE

## 2023-03-09 PROCEDURE — 1157F ADVNC CARE PLAN IN RCRD: CPT | Mod: CPTII,S$GLB,, | Performed by: FAMILY MEDICINE

## 2023-03-09 PROCEDURE — 3061F NEG MICROALBUMINURIA REV: CPT | Mod: CPTII,S$GLB,, | Performed by: FAMILY MEDICINE

## 2023-03-09 PROCEDURE — 1125F AMNT PAIN NOTED PAIN PRSNT: CPT | Mod: CPTII,S$GLB,, | Performed by: FAMILY MEDICINE

## 2023-03-09 PROCEDURE — 99214 PR OFFICE/OUTPT VISIT, EST, LEVL IV, 30-39 MIN: ICD-10-PCS | Mod: S$GLB,,, | Performed by: FAMILY MEDICINE

## 2023-03-09 PROCEDURE — 3066F NEPHROPATHY DOC TX: CPT | Mod: CPTII,S$GLB,, | Performed by: FAMILY MEDICINE

## 2023-03-09 PROCEDURE — 3074F SYST BP LT 130 MM HG: CPT | Mod: CPTII,S$GLB,, | Performed by: FAMILY MEDICINE

## 2023-03-09 PROCEDURE — 3066F PR DOCUMENTATION OF TREATMENT FOR NEPHROPATHY: ICD-10-PCS | Mod: CPTII,S$GLB,, | Performed by: FAMILY MEDICINE

## 2023-03-09 PROCEDURE — 4010F PR ACE/ARB THEARPY RXD/TAKEN: ICD-10-PCS | Mod: CPTII,S$GLB,, | Performed by: FAMILY MEDICINE

## 2023-03-09 PROCEDURE — 99214 OFFICE O/P EST MOD 30 MIN: CPT | Mod: S$GLB,,, | Performed by: FAMILY MEDICINE

## 2023-03-09 PROCEDURE — 3008F PR BODY MASS INDEX (BMI) DOCUMENTED: ICD-10-PCS | Mod: CPTII,S$GLB,, | Performed by: FAMILY MEDICINE

## 2023-03-09 PROCEDURE — 1125F PR PAIN SEVERITY QUANTIFIED, PAIN PRESENT: ICD-10-PCS | Mod: CPTII,S$GLB,, | Performed by: FAMILY MEDICINE

## 2023-03-09 PROCEDURE — 1160F RVW MEDS BY RX/DR IN RCRD: CPT | Mod: CPTII,S$GLB,, | Performed by: FAMILY MEDICINE

## 2023-03-09 PROCEDURE — 1101F PR PT FALLS ASSESS DOC 0-1 FALLS W/OUT INJ PAST YR: ICD-10-PCS | Mod: CPTII,S$GLB,, | Performed by: FAMILY MEDICINE

## 2023-03-09 PROCEDURE — 3078F DIAST BP <80 MM HG: CPT | Mod: CPTII,S$GLB,, | Performed by: FAMILY MEDICINE

## 2023-03-09 PROCEDURE — 3008F BODY MASS INDEX DOCD: CPT | Mod: CPTII,S$GLB,, | Performed by: FAMILY MEDICINE

## 2023-03-09 PROCEDURE — 3288F PR FALLS RISK ASSESSMENT DOCUMENTED: ICD-10-PCS | Mod: CPTII,S$GLB,, | Performed by: FAMILY MEDICINE

## 2023-03-09 PROCEDURE — 1160F PR REVIEW ALL MEDS BY PRESCRIBER/CLIN PHARMACIST DOCUMENTED: ICD-10-PCS | Mod: CPTII,S$GLB,, | Performed by: FAMILY MEDICINE

## 2023-03-09 PROCEDURE — 1157F PR ADVANCE CARE PLAN OR EQUIV PRESENT IN MEDICAL RECORD: ICD-10-PCS | Mod: CPTII,S$GLB,, | Performed by: FAMILY MEDICINE

## 2023-03-09 PROCEDURE — 1159F MED LIST DOCD IN RCRD: CPT | Mod: CPTII,S$GLB,, | Performed by: FAMILY MEDICINE

## 2023-03-09 PROCEDURE — 3044F HG A1C LEVEL LT 7.0%: CPT | Mod: CPTII,S$GLB,, | Performed by: FAMILY MEDICINE

## 2023-03-09 PROCEDURE — 3061F PR NEG MICROALBUMINURIA RESULT DOCUMENTED/REVIEW: ICD-10-PCS | Mod: CPTII,S$GLB,, | Performed by: FAMILY MEDICINE

## 2023-03-09 PROCEDURE — 3078F PR MOST RECENT DIASTOLIC BLOOD PRESSURE < 80 MM HG: ICD-10-PCS | Mod: CPTII,S$GLB,, | Performed by: FAMILY MEDICINE

## 2023-03-09 RX ORDER — TAMSULOSIN HYDROCHLORIDE 0.4 MG/1
CAPSULE ORAL NIGHTLY
COMMUNITY
Start: 2023-02-24 | End: 2023-06-24

## 2023-03-09 RX ORDER — AMITRIPTYLINE HYDROCHLORIDE 10 MG/1
10 TABLET, FILM COATED ORAL
COMMUNITY
Start: 2023-02-22 | End: 2023-12-06 | Stop reason: SDUPTHER

## 2023-03-10 ENCOUNTER — PATIENT OUTREACH (OUTPATIENT)
Dept: ADMINISTRATIVE | Facility: HOSPITAL | Age: 73
End: 2023-03-10
Payer: MEDICARE

## 2023-03-10 NOTE — PROGRESS NOTES
Routine Dilated Eye Exam  (Diabetic Retinopathy screening)     Non-compliant report chart audits for Eye Exam for Patients with Diabetes     Outreach to patient in reference to a routine Eye Exam  RECORDS REQUESTED

## 2023-03-10 NOTE — LETTER
AUTHORIZATION FOR RELEASE OF   CONFIDENTIAL INFORMATION    Radha White OD    We are seeing Katherine Rivers, date of birth 1950, in the clinic at [facility]. Jennifer Casillas MD is the patient's PCP. Katherine Rivers has an outstanding lab/procedure at the time we reviewed her chart. In order to help keep her health information updated, she has authorized us to request the following medical record(s):        EYE EXAM                Please fax records to [facility], Richelle Schiro, MD, [phone]     If you have any questions, please contact Sara Mckeon, Care Coordinator   at [phone].            Patient Name: Katherine Rivers  : 1950  Patient Phone #: [phone]

## 2023-03-13 NOTE — PROGRESS NOTES
Subjective:       Patient ID: Katherine Rivers is a 72 y.o. female.    Chief Complaint: Follow-up    HPI  The patient is a 72-year-old who is here today for her follow-up visit.  Overall, she is doing well after struggling in December with some recent IBS symptoms.    Today we discussed the followin)   IBS.  In December, she was having major stomach issues with significant diarrhea.  She went 10 days with nothing on her stomach because every time she ate she would have diarrhea.  She lost 10 lb during this time because of the diarrhea.  She could not leave the house because the diarrhea.  She had a colonoscopy and stool testing which was unremarkable.  She did meet with a gastroenterologist.  She is currently taking ib guard, Elavil, and Welchol 3 times a day which has helped.  She is also modified her diet and she is following a strict low FODMAP diet.  Thankfully with all these interventions she is 95% better  2) diabetes.  Her recent A1c was 6.2.  She is currently taking her Janumet twice a day  3)  Hypertension.  Today her blood pressure is 116/68.  She is taking her lisinopril consistently      Review of Systems   Constitutional:  Negative for appetite change, chills, diaphoresis, fatigue, fever and unexpected weight change.   HENT:  Negative for congestion, ear pain, postnasal drip, rhinorrhea, sinus pressure, sneezing, sore throat and trouble swallowing.    Eyes:  Negative for pain, discharge and visual disturbance.   Respiratory:  Negative for cough, chest tightness, shortness of breath and wheezing.    Cardiovascular:  Negative for chest pain, palpitations and leg swelling.   Gastrointestinal:  Negative for abdominal distention, abdominal pain, blood in stool, constipation, diarrhea, nausea and vomiting.   Skin:  Negative for rash.       Objective:      Physical Exam  Constitutional:       General: She is not in acute distress.     Appearance: Normal appearance. She is well-developed.   HENT:       Head: Normocephalic and atraumatic.      Right Ear: Hearing, tympanic membrane, ear canal and external ear normal.      Left Ear: Hearing, tympanic membrane, ear canal and external ear normal.      Nose: Nose normal.      Mouth/Throat:      Mouth: No oral lesions.      Pharynx: No oropharyngeal exudate or posterior oropharyngeal erythema.   Eyes:      General: Lids are normal. No scleral icterus.     Extraocular Movements: Extraocular movements intact.      Conjunctiva/sclera: Conjunctivae normal.      Pupils: Pupils are equal, round, and reactive to light.   Neck:      Thyroid: No thyroid mass or thyromegaly.      Vascular: No carotid bruit.   Cardiovascular:      Rate and Rhythm: Normal rate and regular rhythm. No extrasystoles are present.     Chest Wall: PMI is not displaced.      Heart sounds: Normal heart sounds. No murmur heard.    No gallop.   Pulmonary:      Effort: Pulmonary effort is normal. No accessory muscle usage or respiratory distress.      Breath sounds: Normal breath sounds.   Abdominal:      General: Bowel sounds are normal. There is no abdominal bruit.      Palpations: Abdomen is soft.      Tenderness: There is no abdominal tenderness. There is no rebound.   Musculoskeletal:      Cervical back: Normal range of motion and neck supple.   Lymphadenopathy:      Head:      Right side of head: No submental or submandibular adenopathy.      Left side of head: No submental or submandibular adenopathy.      Cervical:      Right cervical: No superficial, deep or posterior cervical adenopathy.     Left cervical: No superficial, deep or posterior cervical adenopathy.      Upper Body:      Right upper body: No supraclavicular adenopathy.      Left upper body: No supraclavicular adenopathy.   Skin:     General: Skin is warm and dry.   Neurological:      Mental Status: She is alert and oriented to person, place, and time.     Blood pressure 116/68, pulse 97, temperature 97.5 °F (36.4 °C), resp. rate 20, height  "5' 5" (1.651 m), weight 60.9 kg (134 lb 2.4 oz), last menstrual period 05/23/2012, SpO2 99 %.Body mass index is 22.32 kg/m².            A/P:  1)  IBS.  Well controlled.  Follow up with GI as planned continue with ib guard, Elavil, Welchol and a low FODMAP diet.    2) diabetes.   Well controlled.  We are going to decrease her Janumet to once a day.  We will check an A1c in 3 months  3)  Hypertension.  Well controlled.  Continue with lisinopril   4)  Hyperlipidemia.  Previously well controlled.  Continue with Lipitor.  We will check a fasting lipid profile with her upcoming labs  "

## 2023-03-20 ENCOUNTER — PATIENT OUTREACH (OUTPATIENT)
Dept: ADMINISTRATIVE | Facility: HOSPITAL | Age: 73
End: 2023-03-20
Payer: MEDICARE

## 2023-03-27 ENCOUNTER — TELEPHONE (OUTPATIENT)
Dept: FAMILY MEDICINE | Facility: CLINIC | Age: 73
End: 2023-03-27
Payer: MEDICARE

## 2023-03-27 NOTE — TELEPHONE ENCOUNTER
----- Message from Jesenia Stanford sent at 3/27/2023 10:02 AM CDT -----  Contact: [phone]  Pt dropped off a Surgical clearance form from [facility] to be completed and faxed to number on form.  In box.

## 2023-04-05 ENCOUNTER — PATIENT MESSAGE (OUTPATIENT)
Dept: FAMILY MEDICINE | Facility: CLINIC | Age: 73
End: 2023-04-05
Payer: MEDICARE

## 2023-04-10 ENCOUNTER — PATIENT MESSAGE (OUTPATIENT)
Dept: FAMILY MEDICINE | Facility: CLINIC | Age: 73
End: 2023-04-10
Payer: MEDICARE

## 2023-04-10 DIAGNOSIS — M25.552 LEFT HIP PAIN: Primary | ICD-10-CM

## 2023-04-10 DIAGNOSIS — M54.9 DORSALGIA, UNSPECIFIED: Primary | ICD-10-CM

## 2023-04-11 ENCOUNTER — HOSPITAL ENCOUNTER (OUTPATIENT)
Dept: RADIOLOGY | Facility: HOSPITAL | Age: 73
Discharge: HOME | End: 2023-04-11
Attending: NURSE PRACTITIONER
Payer: MEDICARE

## 2023-04-11 ENCOUNTER — OFFICE VISIT (OUTPATIENT)
Dept: ORTHOPEDICS | Facility: CLINIC | Age: 73
End: 2023-04-11
Payer: MEDICARE

## 2023-04-11 VITALS — HEIGHT: 65 IN | BODY MASS INDEX: 22.37 KG/M2 | WEIGHT: 134.25 LBS

## 2023-04-11 DIAGNOSIS — M54.32 SCIATICA OF LEFT SIDE: ICD-10-CM

## 2023-04-11 DIAGNOSIS — M54.16 ACUTE LUMBAR RADICULOPATHY: ICD-10-CM

## 2023-04-11 DIAGNOSIS — M54.16 ACUTE LUMBAR RADICULOPATHY: Primary | ICD-10-CM

## 2023-04-11 DIAGNOSIS — M25.552 LEFT HIP PAIN: ICD-10-CM

## 2023-04-11 DIAGNOSIS — R39.14 FEELING OF INCOMPLETE BLADDER EMPTYING: ICD-10-CM

## 2023-04-11 PROCEDURE — 4010F ACE/ARB THERAPY RXD/TAKEN: CPT | Mod: CPTII,S$GLB,, | Performed by: NURSE PRACTITIONER

## 2023-04-11 PROCEDURE — 3066F PR DOCUMENTATION OF TREATMENT FOR NEPHROPATHY: ICD-10-PCS | Mod: CPTII,S$GLB,, | Performed by: NURSE PRACTITIONER

## 2023-04-11 PROCEDURE — 3008F BODY MASS INDEX DOCD: CPT | Mod: CPTII,S$GLB,, | Performed by: NURSE PRACTITIONER

## 2023-04-11 PROCEDURE — 73502 X-RAY EXAM HIP UNI 2-3 VIEWS: CPT | Mod: 26,LT,, | Performed by: RADIOLOGY

## 2023-04-11 PROCEDURE — 99999 PR PBB SHADOW E&M-EST. PATIENT-LVL IV: CPT | Mod: PBBFAC,,, | Performed by: NURSE PRACTITIONER

## 2023-04-11 PROCEDURE — 73502 X-RAY EXAM HIP UNI 2-3 VIEWS: CPT | Mod: TC,PO,LT

## 2023-04-11 PROCEDURE — 1101F PR PT FALLS ASSESS DOC 0-1 FALLS W/OUT INJ PAST YR: ICD-10-PCS | Mod: CPTII,S$GLB,, | Performed by: NURSE PRACTITIONER

## 2023-04-11 PROCEDURE — 3061F NEG MICROALBUMINURIA REV: CPT | Mod: CPTII,S$GLB,, | Performed by: NURSE PRACTITIONER

## 2023-04-11 PROCEDURE — 72110 XR LUMBAR SPINE AP AND LAT WITH FLEX/EXT: ICD-10-PCS | Mod: 26,,, | Performed by: RADIOLOGY

## 2023-04-11 PROCEDURE — 3288F PR FALLS RISK ASSESSMENT DOCUMENTED: ICD-10-PCS | Mod: CPTII,S$GLB,, | Performed by: NURSE PRACTITIONER

## 2023-04-11 PROCEDURE — 3288F FALL RISK ASSESSMENT DOCD: CPT | Mod: CPTII,S$GLB,, | Performed by: NURSE PRACTITIONER

## 2023-04-11 PROCEDURE — 1125F AMNT PAIN NOTED PAIN PRSNT: CPT | Mod: CPTII,S$GLB,, | Performed by: NURSE PRACTITIONER

## 2023-04-11 PROCEDURE — 1157F PR ADVANCE CARE PLAN OR EQUIV PRESENT IN MEDICAL RECORD: ICD-10-PCS | Mod: CPTII,S$GLB,, | Performed by: NURSE PRACTITIONER

## 2023-04-11 PROCEDURE — 3008F PR BODY MASS INDEX (BMI) DOCUMENTED: ICD-10-PCS | Mod: CPTII,S$GLB,, | Performed by: NURSE PRACTITIONER

## 2023-04-11 PROCEDURE — 73502 XR HIP WITH PELVIS WHEN PERFORMED, 2 OR 3 VIEWS LEFT: ICD-10-PCS | Mod: 26,LT,, | Performed by: RADIOLOGY

## 2023-04-11 PROCEDURE — 99999 PR PBB SHADOW E&M-EST. PATIENT-LVL IV: ICD-10-PCS | Mod: PBBFAC,,, | Performed by: NURSE PRACTITIONER

## 2023-04-11 PROCEDURE — 3044F PR MOST RECENT HEMOGLOBIN A1C LEVEL <7.0%: ICD-10-PCS | Mod: CPTII,S$GLB,, | Performed by: NURSE PRACTITIONER

## 2023-04-11 PROCEDURE — 72110 X-RAY EXAM L-2 SPINE 4/>VWS: CPT | Mod: TC,PO

## 2023-04-11 PROCEDURE — 1157F ADVNC CARE PLAN IN RCRD: CPT | Mod: CPTII,S$GLB,, | Performed by: NURSE PRACTITIONER

## 2023-04-11 PROCEDURE — 4010F PR ACE/ARB THEARPY RXD/TAKEN: ICD-10-PCS | Mod: CPTII,S$GLB,, | Performed by: NURSE PRACTITIONER

## 2023-04-11 PROCEDURE — 1125F PR PAIN SEVERITY QUANTIFIED, PAIN PRESENT: ICD-10-PCS | Mod: CPTII,S$GLB,, | Performed by: NURSE PRACTITIONER

## 2023-04-11 PROCEDURE — 1101F PT FALLS ASSESS-DOCD LE1/YR: CPT | Mod: CPTII,S$GLB,, | Performed by: NURSE PRACTITIONER

## 2023-04-11 PROCEDURE — 72110 X-RAY EXAM L-2 SPINE 4/>VWS: CPT | Mod: 26,,, | Performed by: RADIOLOGY

## 2023-04-11 PROCEDURE — 99204 PR OFFICE/OUTPT VISIT, NEW, LEVL IV, 45-59 MIN: ICD-10-PCS | Mod: S$GLB,,, | Performed by: NURSE PRACTITIONER

## 2023-04-11 PROCEDURE — 3061F PR NEG MICROALBUMINURIA RESULT DOCUMENTED/REVIEW: ICD-10-PCS | Mod: CPTII,S$GLB,, | Performed by: NURSE PRACTITIONER

## 2023-04-11 PROCEDURE — 3066F NEPHROPATHY DOC TX: CPT | Mod: CPTII,S$GLB,, | Performed by: NURSE PRACTITIONER

## 2023-04-11 PROCEDURE — 3044F HG A1C LEVEL LT 7.0%: CPT | Mod: CPTII,S$GLB,, | Performed by: NURSE PRACTITIONER

## 2023-04-11 PROCEDURE — 99204 OFFICE O/P NEW MOD 45 MIN: CPT | Mod: S$GLB,,, | Performed by: NURSE PRACTITIONER

## 2023-04-11 RX ORDER — OXYCODONE AND ACETAMINOPHEN 5; 325 MG/1; MG/1
1 TABLET ORAL EVERY 4 HOURS PRN
Qty: 28 TABLET | Refills: 0 | Status: SHIPPED | OUTPATIENT
Start: 2023-04-11 | End: 2023-04-18

## 2023-04-11 NOTE — PROGRESS NOTES
"Chief Complaint   Patient presents with    Left Hip - Pain       HPI:    This is a 72 y.o. who presents today complaining of left hip pain for 1 weeks after no known injury, however she does report sweeping and mopping her home and then performing exercises on rowing machine. At that time, she felt a pull in her lower back but was able to continue exercising. It wasn't until an hr later that she noticed she was having back pain. Pain is aching and dull. + numbness / tingling. She reports having difficulty walking and presents in wheelchair of [facility] with her spouse today. Her left hip pain is to her left lower back area and is radiating down her leg. No pain to outer hip area. Pt also reports she is having fully emptying her bladder. States "it is hard to urinate." Discussed with patient and  she may need ER evaluation with CT or MRI that I would likely not be able to obtain today to rule out cauda equina with her bladder complaints. She does deny any bowel or bladder incontinence or any saddle paraesthesia. I think this is more related to her lumbar spine. She has already been evaluated at an urgent care previously. They did not take any x-rays but she was given a decadron/toradol IM injection and had no relief at all. She does report she took some type of opiate from an old surgery but said was old;  "years ago" possibly, and had no relief.      Past Medical History:   Diagnosis Date    Bilateral hearing loss     wears hearing aides    Cystocele     Diabetes mellitus     dx 57    Diverticular disease     General anesthetics causing adverse effect in therapeutic use     faints after surgery    GERD (gastroesophageal reflux disease)     H/O esophagogastroduodenoscopy     normal 3/16    Hyperlipidemia LDL goal < 100     Hypertension     Irritable bowel syndrome     Osteopenia     Dexa  and 7/15 adn       Past Surgical History:   Procedure Laterality Date    BELT ABDOMINOPLASTY      BILATERAL " SALPINGOOPHORECTOMY      BLADDER SUSPENSION  2/23/12     x 3    CERVICAL POLYPECTOMY      CHOLECYSTECTOMY      COLONOSCOPY  1/2006    by Dr. lauren barcenas (report in media section) diverticulosis, otherwise normal findings, repeat in 10 years for screening    COLONOSCOPY N/A 3/8/2016    Procedure: COLONOSCOPY;  Surgeon: Erickson Uribe Jr., MD;  Location: [facility] ENDO;  Service: Endoscopy;  Laterality: N/A;    COLONOSCOPY N/A 10/18/2022    Procedure: COLONOSCOPY;  Surgeon: Erickson Uribe Jr., MD;  Location: [facility] ENDO;  Service: Endoscopy;  Laterality: N/A;    COLPORRHAPHY      dental implant      HYSTERECTOMY  2011    [facility] BSO    OOPHORECTOMY  2011    bilat    TUBAL LIGATION      VAGINAL DELIVERY      times 2    VAGINAL HYSTERECTOMY W/ ANTERIOR AND POSTERIOR VAGINAL REPAIR  05/20/14      Current Outpatient Medications on File Prior to Visit   Medication Sig Dispense Refill    amitriptyline (ELAVIL) 10 MG tablet Take 10 mg by mouth.      ascorbate calcium, vitamin C, (VITAMIN C, ASCORBATE CALCIUM,) 814 mg/gram Powd Take 1 packet by mouth once daily.      atorvastatin (LIPITOR) 20 MG tablet TAKE 1 TABLET BY MOUTH  DAILY 90 tablet 3    blood sugar diagnostic Strp For daily and prn checks 100 strip prn    blood-glucose meter (BLOOD GLUCOSE MONITORING) kit Use as instructed 1 each 0    colesevelam (WELCHOL) 625 mg tablet Take 1 tablet (625 mg total) by mouth once daily. (Patient taking differently: Take 625 mg by mouth 3 (three) times daily with meals.) 90 tablet 1    dicyclomine (BENTYL) 10 MG capsule TAKE 1 CAPSULE (10 MG TOTAL) BY MOUTH BEFORE MEALS AND AT BEDTIME AS NEEDED (DIARRHEA). 120 capsule 11    estradioL (VAGIFEM) 10 mcg Tab PLACE 1 INSERT VAGINALLY  TWICE WEEKLY 24 tablet 3    L. ACIDOPHILUS/BIFID. ANIMALIS (ONE-A-DAY TRUBIOTICS ORAL) Take 1 tablet by mouth once daily.      lancets (ON[facility]CH ULTRASOFT LANCETS) [facility] to [facility] daily. 100 each 5    lisinopriL (PRINIVIL,ZESTRIL) 5 MG tablet  TAKE 1 TABLET BY MOUTH ONCE DAILY 90 tablet 2    MULTIVITAMIN WITH MINERALS (HAIR,SKIN & NAILS ORAL) Take 1 tablet by mouth 2 (two) times daily.      nitrofurantoin, macrocrystal-monohydrate, (MACROBID) 100 MG capsule Take 1 capsule by mouth 2 (two) times daily as needed.      SITagliptan-metformin (JANUMET) 50-1,000 mg per tablet TAKE 1 TABLET BY MOUTH TWICE A DAY WITH A MEAL (Patient taking differently: Take 1 tablet by mouth once daily.) 180 tablet 1    tamsulosin (FLOMAX) 0.4 mg Cap Take by mouth every evening.      tretinoin (RETIN-A) 0.025 % cream Apply topically every evening. 45 g 1    UNABLE TO FIND medication name: Uqora - daily - states only takes two of the components       No current facility-administered medications on file prior to visit.      Review of patient's allergies indicates:   Allergen Reactions    No known drug allergies       Family History not pertinent   Social History     Socioeconomic History    Marital status:    Tobacco Use    Smoking status: Never    Smokeless tobacco: Never   Substance and Sexual Activity    Alcohol use: Yes     Alcohol/week: 0.8 standard drinks     Types: 1 Standard drinks or equivalent per week     Comment: rarely     Drug use: No    Sexual activity: Yes     Partners: Male     Birth control/protection: Post-menopausal     Social Determinants of Health     Financial Resource Strain: Low Risk     Difficulty of Paying Living Expenses: Not hard at all   Food Insecurity: No Food Insecurity    Worried About Running Out of Food in the Last Year: Never true    Ran Out of Food in the Last Year: Never true   Transportation Needs: No Transportation Needs    Lack of Transportation (Medical): No    Lack of Transportation (Non-Medical): No   Physical Activity: Sufficiently Active    Days of Exercise per Week: 7 days    Minutes of Exercise per Session: 60 min   Stress: No Stress Concern Present    Feeling of Stress : Only a little   Social Connections: Unknown     Frequency of Communication with Friends and Family: More than three times a week    Frequency of Social Gatherings with Friends and Family: More than three times a week    Active Member of Clubs or Organizations: Yes    Attends Club or Organization Meetings: More than 4 times per year    Marital Status:    Housing Stability: Low Risk     Unable to Pay for Housing in the Last Year: No    Number of Places Lived in the Last Year: 1    Unstable Housing in the Last Year: No         Review of Systems:   Constitutional:  Denies fever or chills    Eyes:  Denies change in visual acuity    HENT:  Denies nasal congestion or sore throat    Respiratory:  Denies cough or shortness of breath    Cardiovascular:  Denies chest pain or edema    GI:  Denies abdominal pain, nausea, vomiting, bloody stools or diarrhea    :  Denies dysuria    Integument:  Denies rash    Neurologic:  Denies headache, focal weakness or sensory changes    Endocrine:  Denies polyuria or polydipsia    Lymphatic:  Denies swollen glands    Psychiatric:  Denies depression or anxiety       Physical Exam:    Constitutional:  Well developed, well nourished, no acute distress, non-toxic appearance    Integument:  Well hydrated  Neurologic:  Alert & oriented x 3  Psychiatric:  Speech and behavior appropriate      Bilateral Hip Exam Performed    Left  Hip Exam     Tenderness   The patient is experiencing no tenderness in the greater trochanter.  Has pain to left lumbar and left buttock area without any area of TTP.     Range of Motion   The patient has normal hip ROM.    Muscle Strength   Abduction: 4/5     Other   Erythema: absent  Sensation: normal  Pulse: present    Right Hip Exam   Hip exam performed same as contralateral hip and is normal.     X-rays were performed today, personally reviewed by me and findings discussed with the patient.  2 views of the left hip show no fractures or dislocations        X-rays were performed today, personally reviewed by  me and findings discussed with the patient.   2 views of the left hip show no osteoarthritis.             Acute lumbar radiculopathy  -     Ambulatory referral/consult to Back & Spine Clinic; Future; Expected date: 04/18/2023  -     oxyCODONE-acetaminophen (PERCOCET) 5-325 mg per tablet; Take 1 tablet by mouth every 4 (four) hours as needed for Pain.  Dispense: 28 tablet; Refill: 0  -     X-Ray Lumbar Spine Ap Lateral w/Flex Ext; Future; Expected date: 04/11/2023    Sciatica of left side  -     Ambulatory referral/consult to Back & Spine Clinic; Future; Expected date: 04/18/2023  -     X-Ray Lumbar Spine Ap Lateral w/Flex Ext; Future; Expected date: 04/11/2023    Feeling of incomplete bladder emptying      Discussed with patient and spouse plan of care as above.   Recommend that they present to ER with her lower lumbar complaints with being unable to empty bladder. With toradol and decadron inj IM given from urgent care providing no relief, will give her a small qty of percocet as she is having severe pain on exam rating 10/10.   She was also given muscle relaxer from urgent care that is not helping either.   Will also refer her to back and spine clinic for further evaluation.     Rtc as needed.       Answers submitted by the patient for this visit:  New Patient on 4/11/2023 11:20 AM with Karla Mayo  Orthopedics Questionnaire (Submitted on 4/10/2023)  unexpected weight change: No  appetite change : No  sleep disturbance: No  IMMUNOCOMPROMISED: No  nervous/ anxious: No  dysphoric mood: No  rash: No  visual disturbance: No  eye redness: No  eye pain: No  ear pain: No  tinnitus: No  hearing loss: No  sinus pressure : No  nosebleeds: No  enviro allergies: No  food allergies: No  cough: No  shortness of breath: No  sweating: No  dysuria: No  frequency: No  difficulty urinating: No  hematuria: No  painful intercourse: No  chest pain: No  palpitations: No  nausea: No  vomiting: No  diarrhea: No  blood in stool:  No  constipation: No  headaches: No  dizziness: No  numbness: No  seizures: No  joint swelling: No  myalgia: Yes  weakness: No  back pain: Yes

## 2023-04-12 ENCOUNTER — TELEPHONE (OUTPATIENT)
Dept: UROLOGY | Facility: CLINIC | Age: 73
End: 2023-04-12
Payer: MEDICARE

## 2023-04-12 ENCOUNTER — OFFICE VISIT (OUTPATIENT)
Dept: PAIN MEDICINE | Facility: CLINIC | Age: 73
End: 2023-04-12
Payer: MEDICARE

## 2023-04-12 VITALS
SYSTOLIC BLOOD PRESSURE: 101 MMHG | BODY MASS INDEX: 22.48 KG/M2 | WEIGHT: 134.94 LBS | DIASTOLIC BLOOD PRESSURE: 66 MMHG | HEIGHT: 65 IN | HEART RATE: 107 BPM

## 2023-04-12 DIAGNOSIS — M51.26 HERNIATED LUMBAR INTERVERTEBRAL DISC: ICD-10-CM

## 2023-04-12 DIAGNOSIS — M54.42 ACUTE LEFT-SIDED LOW BACK PAIN WITH LEFT-SIDED SCIATICA: Primary | ICD-10-CM

## 2023-04-12 DIAGNOSIS — R33.9 URINARY RETENTION: ICD-10-CM

## 2023-04-12 PROCEDURE — 3008F PR BODY MASS INDEX (BMI) DOCUMENTED: ICD-10-PCS | Mod: CPTII,S$GLB,, | Performed by: PHYSICIAN ASSISTANT

## 2023-04-12 PROCEDURE — 3078F DIAST BP <80 MM HG: CPT | Mod: CPTII,S$GLB,, | Performed by: PHYSICIAN ASSISTANT

## 2023-04-12 PROCEDURE — 3074F SYST BP LT 130 MM HG: CPT | Mod: CPTII,S$GLB,, | Performed by: PHYSICIAN ASSISTANT

## 2023-04-12 PROCEDURE — 3061F PR NEG MICROALBUMINURIA RESULT DOCUMENTED/REVIEW: ICD-10-PCS | Mod: CPTII,S$GLB,, | Performed by: PHYSICIAN ASSISTANT

## 2023-04-12 PROCEDURE — 1159F PR MEDICATION LIST DOCUMENTED IN MEDICAL RECORD: ICD-10-PCS | Mod: CPTII,S$GLB,, | Performed by: PHYSICIAN ASSISTANT

## 2023-04-12 PROCEDURE — 4010F ACE/ARB THERAPY RXD/TAKEN: CPT | Mod: CPTII,S$GLB,, | Performed by: PHYSICIAN ASSISTANT

## 2023-04-12 PROCEDURE — 99999 PR PBB SHADOW E&M-EST. PATIENT-LVL V: CPT | Mod: PBBFAC,,, | Performed by: PHYSICIAN ASSISTANT

## 2023-04-12 PROCEDURE — 1157F PR ADVANCE CARE PLAN OR EQUIV PRESENT IN MEDICAL RECORD: ICD-10-PCS | Mod: CPTII,S$GLB,, | Performed by: PHYSICIAN ASSISTANT

## 2023-04-12 PROCEDURE — 3288F PR FALLS RISK ASSESSMENT DOCUMENTED: ICD-10-PCS | Mod: CPTII,S$GLB,, | Performed by: PHYSICIAN ASSISTANT

## 2023-04-12 PROCEDURE — 99204 PR OFFICE/OUTPT VISIT, NEW, LEVL IV, 45-59 MIN: ICD-10-PCS | Mod: S$GLB,,, | Performed by: PHYSICIAN ASSISTANT

## 2023-04-12 PROCEDURE — 1125F PR PAIN SEVERITY QUANTIFIED, PAIN PRESENT: ICD-10-PCS | Mod: CPTII,S$GLB,, | Performed by: PHYSICIAN ASSISTANT

## 2023-04-12 PROCEDURE — 1160F RVW MEDS BY RX/DR IN RCRD: CPT | Mod: CPTII,S$GLB,, | Performed by: PHYSICIAN ASSISTANT

## 2023-04-12 PROCEDURE — 1101F PT FALLS ASSESS-DOCD LE1/YR: CPT | Mod: CPTII,S$GLB,, | Performed by: PHYSICIAN ASSISTANT

## 2023-04-12 PROCEDURE — 4010F PR ACE/ARB THEARPY RXD/TAKEN: ICD-10-PCS | Mod: CPTII,S$GLB,, | Performed by: PHYSICIAN ASSISTANT

## 2023-04-12 PROCEDURE — 3066F PR DOCUMENTATION OF TREATMENT FOR NEPHROPATHY: ICD-10-PCS | Mod: CPTII,S$GLB,, | Performed by: PHYSICIAN ASSISTANT

## 2023-04-12 PROCEDURE — 1157F ADVNC CARE PLAN IN RCRD: CPT | Mod: CPTII,S$GLB,, | Performed by: PHYSICIAN ASSISTANT

## 2023-04-12 PROCEDURE — 99204 OFFICE O/P NEW MOD 45 MIN: CPT | Mod: S$GLB,,, | Performed by: PHYSICIAN ASSISTANT

## 2023-04-12 PROCEDURE — 1160F PR REVIEW ALL MEDS BY PRESCRIBER/CLIN PHARMACIST DOCUMENTED: ICD-10-PCS | Mod: CPTII,S$GLB,, | Performed by: PHYSICIAN ASSISTANT

## 2023-04-12 PROCEDURE — 3074F PR MOST RECENT SYSTOLIC BLOOD PRESSURE < 130 MM HG: ICD-10-PCS | Mod: CPTII,S$GLB,, | Performed by: PHYSICIAN ASSISTANT

## 2023-04-12 PROCEDURE — 3288F FALL RISK ASSESSMENT DOCD: CPT | Mod: CPTII,S$GLB,, | Performed by: PHYSICIAN ASSISTANT

## 2023-04-12 PROCEDURE — 3008F BODY MASS INDEX DOCD: CPT | Mod: CPTII,S$GLB,, | Performed by: PHYSICIAN ASSISTANT

## 2023-04-12 PROCEDURE — 3061F NEG MICROALBUMINURIA REV: CPT | Mod: CPTII,S$GLB,, | Performed by: PHYSICIAN ASSISTANT

## 2023-04-12 PROCEDURE — 3044F HG A1C LEVEL LT 7.0%: CPT | Mod: CPTII,S$GLB,, | Performed by: PHYSICIAN ASSISTANT

## 2023-04-12 PROCEDURE — 1125F AMNT PAIN NOTED PAIN PRSNT: CPT | Mod: CPTII,S$GLB,, | Performed by: PHYSICIAN ASSISTANT

## 2023-04-12 PROCEDURE — 3078F PR MOST RECENT DIASTOLIC BLOOD PRESSURE < 80 MM HG: ICD-10-PCS | Mod: CPTII,S$GLB,, | Performed by: PHYSICIAN ASSISTANT

## 2023-04-12 PROCEDURE — 3066F NEPHROPATHY DOC TX: CPT | Mod: CPTII,S$GLB,, | Performed by: PHYSICIAN ASSISTANT

## 2023-04-12 PROCEDURE — 3044F PR MOST RECENT HEMOGLOBIN A1C LEVEL <7.0%: ICD-10-PCS | Mod: CPTII,S$GLB,, | Performed by: PHYSICIAN ASSISTANT

## 2023-04-12 PROCEDURE — 1159F MED LIST DOCD IN RCRD: CPT | Mod: CPTII,S$GLB,, | Performed by: PHYSICIAN ASSISTANT

## 2023-04-12 PROCEDURE — 99999 PR PBB SHADOW E&M-EST. PATIENT-LVL V: ICD-10-PCS | Mod: PBBFAC,,, | Performed by: PHYSICIAN ASSISTANT

## 2023-04-12 PROCEDURE — 1101F PR PT FALLS ASSESS DOC 0-1 FALLS W/OUT INJ PAST YR: ICD-10-PCS | Mod: CPTII,S$GLB,, | Performed by: PHYSICIAN ASSISTANT

## 2023-04-12 NOTE — PROGRESS NOTES
[facility] New Patient Evaluation      Referred by: Dr. Davion Valle    PCP:   Jennifer Casillas MD    CC:   Chief Complaint   Patient presents with    Low-back Pain     Pain radiates down the left leg.          HPI:   Katherine Rivers is a 72 y.o. year old female patient who has a past medical history of Bilateral hearing loss, Cystocele, Diabetes mellitus, Diverticular disease, General anesthetics causing adverse effect in therapeutic use, GERD (gastroesophageal reflux disease), H/O esophagogastroduodenoscopy, Hyperlipidemia LDL goal < 100, Hypertension, Irritable bowel syndrome, and Osteopenia. She presents in referral from Dr. Davion Valle for back and left leg pain.  On 4-4-23 she swept and mopped at home then exercised on a rowing machine.  She felt a pull in the lower back, but kept up with other exercises for the day.  About an hour later she developed pain in the left lower back.  She was seen by 2 urgent care clinics, ortho clinic and ER.  Was sent to ER by the ortho clinic for urinary retention.  She has tried nsaids, steroids, muscle relaxants and opiod medications she had at home from prior ailments.  She has chronic urinary retention and has had 3 bladder surgeries.  MRI in the ER did not show any evicence of cauda equina. Denies bowel/ bladder incontinence.  Continues with left lower lumbar buttock pain.  At times she has a slight pain in the left outer leg.  She feels a numbness/ tightness pain in the left foot and ankle.    She had a large post void residual volume remaining in the bladder at the ER yesterday.  It was recommended she leave a catheter in until seen by urology, but she declined.  She has not urinated in over 12 hours since leaving the ER and has not scheduled a urology follow up.    Past and current medications:  Antineuropathics:  gabapentin (prescribed 4-11-23)  NSAIDs:  asvil (tried IM torodaol)  Antidepressants:  Muscle relaxers: flexeril  Opioids:   (oxycodone tried - no longer taking):  Antiplatelets/Anticoagulants:  Others:  (IM decadron 4-5-23, medrol taper completed)    Physical Therapy/ Chiropractic care:  none    Pain Intervention History:  none    Past Spine Surgical History:  none        History:    Current Outpatient Medications:     amitriptyline (ELAVIL) 10 MG tablet, Take 10 mg by mouth., Disp: , Rfl:     ascorbate calcium, vitamin C, (VITAMIN C, ASCORBATE CALCIUM,) 814 mg/gram Powd, Take 1 packet by mouth once daily., Disp: , Rfl:     atorvastatin (LIPITOR) 20 MG tablet, TAKE 1 TABLET BY MOUTH  DAILY, Disp: 90 tablet, Rfl: 3    blood sugar diagnostic Strp, For daily and prn checks, Disp: 100 strip, Rfl: prn    blood-glucose meter (BLOOD GLUCOSE MONITORING) kit, Use as instructed, Disp: 1 each, Rfl: 0    colesevelam (WELCHOL) 625 mg tablet, Take 1 tablet (625 mg total) by mouth once daily. (Patient taking differently: Take 625 mg by mouth 3 (three) times daily with meals.), Disp: 90 tablet, Rfl: 1    dicyclomine (BENTYL) 10 MG capsule, TAKE 1 CAPSULE (10 MG TOTAL) BY MOUTH BEFORE MEALS AND AT BEDTIME AS NEEDED (DIARRHEA)., Disp: 120 capsule, Rfl: 11    estradioL (VAGIFEM) 10 mcg Tab, PLACE 1 INSERT VAGINALLY  TWICE WEEKLY, Disp: 24 tablet, Rfl: 3    gabapentin (NEURONTIN) 100 MG capsule, Take 1-3 capsules (100-300 mg total) by mouth 3 (three) times daily as needed (back, hip pain)., Disp: 30 capsule, Rfl: 2    L. ACIDOPHILUS/BIFID. ANIMALIS (ONE-A-DAY TRUBIOTICS ORAL), Take 1 tablet by mouth once daily., Disp: , Rfl:     lancets (ONETOUCH ULTRASOFT LANCETS) Misc, Pt to check blood sugar 2x daily., Disp: 100 each, Rfl: 5    lisinopriL (PRINIVIL,ZESTRIL) 5 MG tablet, TAKE 1 TABLET BY MOUTH ONCE DAILY, Disp: 90 tablet, Rfl: 2    MULTIVITAMIN WITH MINERALS (HAIR,SKIN & NAILS ORAL), Take 1 tablet by mouth 2 (two) times daily., Disp: , Rfl:     nitrofurantoin, macrocrystal-monohydrate, (MACROBID) 100 MG capsule, Take 1 capsule by mouth 2 (two) times daily  as needed., Disp: , Rfl:     omeprazole (PRILOSEC) 40 MG capsule, TAKE 1 CAPSULE BY MOUTH  ONCE DAILY, Disp: 90 capsule, Rfl: 3    oxyCODONE-acetaminophen (PERCOCET) 5-325 mg per tablet, Take 1 tablet by mouth every 4 (four) hours as needed for Pain., Disp: 28 tablet, Rfl: 0    SITagliptan-metformin (JANUMET) 50-1,000 mg per tablet, TAKE 1 TABLET BY MOUTH TWICE A DAY WITH A MEAL (Patient taking differently: Take 1 tablet by mouth once daily.), Disp: 180 tablet, Rfl: 1    tamsulosin (FLOMAX) 0.4 mg Cap, Take by mouth every evening., Disp: , Rfl:     tiZANidine (ZANAFLEX) 2 MG tablet, Take 2 tablets (4 mg total) by mouth every 8 (eight) hours as needed (muscle spasm / back / hip pain)., Disp: 30 tablet, Rfl: 1    tretinoin (RETIN-A) 0.025 % cream, Apply topically every evening., Disp: 45 g, Rfl: 1    UNABLE TO FIND, medication name: Uqora - daily - states only takes two of the components, Disp: , Rfl:   No current facility-administered medications for this visit.    Past Medical History:   Diagnosis Date    Bilateral hearing loss     wears hearing aides    Cystocele     Diabetes mellitus     dx 57    Diverticular disease     General anesthetics causing adverse effect in therapeutic use     faints after surgery    GERD (gastroesophageal reflux disease)     H/O esophagogastroduodenoscopy     normal 3/16    Hyperlipidemia LDL goal < 100     Hypertension     Irritable bowel syndrome     Osteopenia     Dexa 4/11 and 7/15 adn 11/18       Past Surgical History:   Procedure Laterality Date    BELT ABDOMINOPLASTY      BILATERAL SALPINGOOPHORECTOMY      BLADDER SUSPENSION  2/23/12     x 3    CERVICAL POLYPECTOMY      CHOLECYSTECTOMY      COLONOSCOPY  1/2006    by Dr. lauren barcenas (report in media section) diverticulosis, otherwise normal findings, repeat in 10 years for screening    COLONOSCOPY N/A 3/8/2016    Procedure: COLONOSCOPY;  Surgeon: Erickson Uribe Jr., MD;  Location: [facility];  Service: Endoscopy;  Laterality:  N/A;    COLONOSCOPY N/A 10/18/2022    Procedure: COLONOSCOPY;  Surgeon: Erickson Uribe Jr., MD;  Location: [facility];  Service: Endoscopy;  Laterality: N/A;    COLPORRHAPHY      dental implant      HYSTERECTOMY  2011    [facility]    OOPHORECTOMY  2011    bilat    TUBAL LIGATION      VAGINAL DELIVERY      times 2    VAGINAL HYSTERECTOMY W/ ANTERIOR AND POSTERIOR VAGINAL REPAIR  05/20/14       Family History   Problem Relation Age of Onset    Hyperlipidemia Mother     Diabetes Father         Type 1    Cancer Father         lung + tob    Cancer Maternal Aunt         breast cancer    Breast cancer Maternal Aunt 40    Ovarian cancer Neg Hx        Social History     Socioeconomic History    Marital status:    Tobacco Use    Smoking status: Never    Smokeless tobacco: Never   Substance and Sexual Activity    Alcohol use: Yes     Alcohol/week: 0.8 standard drinks     Types: 1 Standard drinks or equivalent per week     Comment: rarely     Drug use: No    Sexual activity: Yes     Partners: Male     Birth control/protection: Post-menopausal     Social Determinants of Health     Financial Resource Strain: Low Risk     Difficulty of Paying Living Expenses: Not hard at all   Food Insecurity: No Food Insecurity    Worried About Running Out of Food in the Last Year: Never true    Ran Out of Food in the Last Year: Never true   Transportation Needs: No Transportation Needs    Lack of Transportation (Medical): No    Lack of Transportation (Non-Medical): No   Physical Activity: Sufficiently Active    Days of Exercise per Week: 7 days    Minutes of Exercise per Session: 60 min   Stress: No Stress Concern Present    Feeling of Stress : Only a little   Social Connections: Unknown    Frequency of Communication with Friends and Family: More than three times a week    Frequency of Social Gatherings with Friends and Family: More than three times a week    Active Member of Clubs or Organizations: Yes    Attends Club or Organization  "Meetings: More than 4 times per year    Marital Status:    Housing Stability: Low Risk     Unable to Pay for Housing in the Last Year: No    Number of Places Lived in the Last Year: 1    Unstable Housing in the Last Year: No       Review of patient's allergies indicates:   Allergen Reactions    No known drug allergies        Labs:  Lab Results   Component Value Date    LABA1C 6.2 (H) 06/08/2018    HGBA1C 6.2 (H) 03/01/2023       Lab Results   Component Value Date    WBC 13.84 (H) 04/11/2023    HGB 13.4 04/11/2023    HCT 42.7 04/11/2023    MCV 84 04/11/2023     04/11/2023           Review of Systems:  Left low back pain..  Left leg pain.  Balance of review of systems is negative.    Physical Exam:  Vitals:    04/12/23 0759   BP: 101/66   Pulse: 107   Weight: 61.2 kg (134 lb 14.7 oz)   Height: 5' 5" (1.651 m)   PainSc: 10-Worst pain ever   PainLoc: Back     Body mass index is 22.45 kg/m².    Gen: NAD  Psych: mood appropriate for given condition  HEENT: eyes anicteric   CV: RRR, 2+ radial pulse  HEENT: anicteric   Respiratory: non-labored, no signs of respiratory distress  Abd: non-distended  Skin: warm, dry and intact.  Gait: Able to heel walk, toe walk. No antalgic gait.     Coordination:   Romberg: negative  Finger to nose coordination: normal  Heel to shin coordination: normal  Tandem walking coordination: normal    Cervical spine: ROM is full in flexion, extension and lateral rotation without increased pain.  Spurling's maneuver causes no neck pain to either side.  Myofascial exam: No Tenderness to palpation across cervical paraspinous region bilaterally.    Lumbar spine:  Lumbar spine: ROM is full with flexion extension and oblique extension with no increased pain.    Valentin's test causes no increased pain on either side.    Supine straight leg raise is negative bilaterally.    Internal and external rotation of the hip causes no increased pain on either side.  Myofascial exam: No tenderness to " palpation across lumbar paraspinous muscles. No tenderness to palpation over the bilateral greater trochanters and bilateral SI joint    Sensory:  Intact and symmetrical to light touch in C4-T1 dermatomes bilaterally. Intact and symmetrical to light touch in L1-S1 dermatomes bilaterally.    Motor:    Right Left   C4 Shoulder Abduction  5  5   C5 Elbow Flexion    5  5   C6 Wrist Extension  5  5   C7 Elbow Extension   5  5   C8/T1 Hand Intrinsics   5  5        Right Left   L2/3 Iliacus Hip flexion  5  5   L3/4 Qudratus Femoris Knee Extension  5  5   L4/5 Tib Anterior Ankle Dorsiflexion   5  5   L5/S1 Extensor Hallicus Longus Great toe extension  5  5   S1/S2 Gastroc/Soleus Plantar Flexion  5  5      Right Left   Triceps DTR 2+ 2+   Biceps DTR 2+ 2+   Brachioradialis DTR 2+ 2+   Patellar DTR 2+ 2+   Achilles DTR 2+ 2+   Anthony Absent  Absent   Clonus Absent Absent   Babinski Absent Absent       Imaging:  Mri lumbar spine 4-11-23:  L4/5 small left lateral recess disk hernia with mild left lateral recess stenosis.  No fracture.  No significant central canal narrowing.    Xray lumbar spine  4-11-23:  Vertebral body heights are preserved.  No fractures or bony destructive changes.  Alignment is within normal limits without evidence of abnormal motion during dynamic flexion and extension views.  Facet arthrosis most pronounced at the lower lumbar levels.  No significant degenerative disc height loss.     Xray hip 4-11-23:  some osteophyte formation in the femoral neck regions bilaterally and to lesser extent in the acetabular regions suggesting mild to moderate osteoarthritis of both hips.  There is no evidence of acute fracture or subluxation about the left hip.  Mild irregularity is noted about the pubic symphysis which is also likely degenerative.      Assessment:   Katherine Rivers is a 72 y.o. year old female patient who has a past medical history of Bilateral hearing loss, Cystocele, Diabetes mellitus, Diverticular  disease, General anesthetics causing adverse effect in therapeutic use, GERD (gastroesophageal reflux disease), H/O esophagogastroduodenoscopy, Hyperlipidemia LDL goal < 100, Hypertension, Irritable bowel syndrome, and Osteopenia. She presents in referral from Dr. Davion Valle for left low back and some leg pain.  Left lower lumbar strain vs L5 radiculopathy to the left due to small left L4/5 lateral recess disc hernia.    Problem List Items Addressed This Visit    None  Visit Diagnoses       Acute left-sided low back pain with left-sided sciatica    -  Primary    Relevant Orders    Ambulatory referral/consult to Physical/Occupational Therapy    Herniated lumbar intervertebral disc        Relevant Orders    Ambulatory referral/consult to Physical/Occupational Therapy    Urinary retention        Relevant Orders    Ambulatory referral/consult to Urology            Plan:  To help with pain - continue with advil and flexeril and lidoderm patch.  Continue to hold and stop opiods.   I recommend PT and she is agreeable.  I have encouraged good posture and reguar movement/ exercise to help with pain.  Referall to urology placed.  We will try to have her see someone today.  She typeically sees Dr. Trena Tobias with urogynocology , but he does not have clinic in [place] until next week.  If unable to get urology appt today, she understands that she will need to go back to the ER.  Follow up with me in 6-8 weeks to monitor [id number].      Follow Up: RTC in 6  weeks    : Not viewed.    Thank you for referring this interesting patient, and I look forward to continuing to collaborate in her care.        Renuka Roger PA-C  [facility]

## 2023-04-12 NOTE — PATIENT INSTRUCTIONS
Patients Guide to Back Pain    Low back pain effects 2/3 of adults at some point in their lives.  It is one of the top 10 reasons to go to the doctor.  Back pain is scary, sudden, and painful but does usually improve.  It is usually benign and is not caused by a serious underlying disease.  95% of back pain is mechanical, meaning something makes it worse (most commonly bending and sitting).    If bending makes it worse, extension stretches can be helpful.    If standing makes it worse flexion, stretches and z-lie can be helpful.   You can find details on these stretches and more in Treat Your Own Back by Syed Patel.    Moving is the best medicine, even if it hurts. Bed rest is not recommended for back pain.  Early return to daily activities leads to less chronic disability.  Early involvement in Physical Therapy can decrease the likelihood of acute low back pain becoming chronic.1  Back pain is a complex issue to diagnose. A definite diagnosis for back pain cannot be made for nearly 85% of patients at the initial visit.   Medicine such as muscle relaxers and anti-inflammatories can be helpful. Narcotic pain medicine is not recommended for back pain.    Imaging Guidance:  Imaging such as MRIs and X-rays are not necessary in the beginning and do not influence treatment or influence the course of acute back pain. Degenerative changes such as disc bulges and arthritis are common in people as young as 18 and with no pain.  In people under 50 with no signs and symptoms of systemic disease no imaging is needed.2  Conservative care for 6 weeks is recommended for patients with or without shooting nerve pain in arms/legs prior to MRI.2  Imaging of patients with low back pain without indications of serious underlying conditions does not improve outcomes.3  RESOURCES  [facility] website:  [email or web address]  [facility]  website:  [email or web address]  Books:  Treat Your Own Back by Syed Patel  Treat Your Own Neck by Syed Patel  KEY TAKEAWAYS:  Moving is recommended. Bed rest is not recommended.  Seek Physical Therapy as early as possible.  Acute flares will improve.    1WVu macdonald et al. Nonpharmacologic options for treating acute and chronic pain. PMR journal 7 2015) [id number]  2JJames martin and Terry Duckworth. Diagnostic evaluation of low back pain with emphasis on imaging. Annals of internal medicine 2002; 137:586-597  3CErnst cates Rongwei Fu, Terry Ruggiero. Imaging studies for low back pain: systemic review and meta-analysis. Lancet 2009;373 463-54

## 2023-04-13 ENCOUNTER — PATIENT MESSAGE (OUTPATIENT)
Dept: FAMILY MEDICINE | Facility: CLINIC | Age: 73
End: 2023-04-13
Payer: MEDICARE

## 2023-04-13 ENCOUNTER — TELEPHONE (OUTPATIENT)
Dept: FAMILY MEDICINE | Facility: CLINIC | Age: 73
End: 2023-04-13
Payer: MEDICARE

## 2023-04-13 RX ORDER — OMEPRAZOLE 40 MG/1
CAPSULE, DELAYED RELEASE ORAL
Qty: 90 CAPSULE | Refills: 0 | Status: SHIPPED | OUTPATIENT
Start: 2023-04-13 | End: 2023-06-24

## 2023-04-13 NOTE — TELEPHONE ENCOUNTER
No new care gaps identified.  [facility] Embedded Care Gaps. Reference number: [id number]. 4/13/2023   4:19:56 AM RICHT

## 2023-04-13 NOTE — TELEPHONE ENCOUNTER
Refill Decision Note   Katherine Rivers  is requesting a refill authorization.  Brief Assessment and Rationale for Refill:  Approve     Medication Therapy Plan:  ED documentation reviewed. No change to therapy noted. No interaction to medications prescribed at this visit. Ok to approve. FOV 9/21/23.      Extended chart review required: Yes   Comments:     No Care Gaps recommended.     Note composed:3:42 PM 04/13/2023

## 2023-04-13 NOTE — TELEPHONE ENCOUNTER
Provider Staff:  Action required. This patient has received emergency care.     Please schedule patient for a follow up appointment.     Thanks!  [facility]     Appointments      Date Provider   Last Visit   3/9/2023 Jennifer Casillas MD   Next Visit   9/21/2023 Jennifer Casillas MD

## 2023-04-13 NOTE — TELEPHONE ENCOUNTER
Please see the attached refill request.  LAst ED visit was for sciatic pain and follow up was with pain management clinic immediately after.

## 2023-04-28 ENCOUNTER — CLINICAL SUPPORT (OUTPATIENT)
Dept: REHABILITATION | Facility: HOSPITAL | Age: 73
End: 2023-04-28
Payer: MEDICARE

## 2023-04-28 DIAGNOSIS — M53.86 DECREASED RANGE OF MOTION OF LUMBAR SPINE: ICD-10-CM

## 2023-04-28 DIAGNOSIS — R53.1 WEAKNESS: ICD-10-CM

## 2023-04-28 DIAGNOSIS — M51.26 HERNIATED LUMBAR INTERVERTEBRAL DISC: ICD-10-CM

## 2023-04-28 DIAGNOSIS — M54.50 ACUTE LEFT-SIDED LOW BACK PAIN WITHOUT SCIATICA: ICD-10-CM

## 2023-04-28 DIAGNOSIS — M54.42 ACUTE LEFT-SIDED LOW BACK PAIN WITH LEFT-SIDED SCIATICA: ICD-10-CM

## 2023-04-28 PROCEDURE — 97140 MANUAL THERAPY 1/> REGIONS: CPT | Mod: PO

## 2023-04-28 PROCEDURE — 97162 PT EVAL MOD COMPLEX 30 MIN: CPT | Mod: PO

## 2023-04-28 PROCEDURE — 97110 THERAPEUTIC EXERCISES: CPT | Mod: PO

## 2023-04-30 PROBLEM — M53.86 DECREASED RANGE OF MOTION OF LUMBAR SPINE: Status: ACTIVE | Noted: 2023-04-30

## 2023-04-30 PROBLEM — M54.50 ACUTE LEFT-SIDED LOW BACK PAIN WITHOUT SCIATICA: Status: ACTIVE | Noted: 2023-04-30

## 2023-04-30 PROBLEM — R53.1 WEAKNESS: Status: ACTIVE | Noted: 2023-04-30

## 2023-04-30 NOTE — PLAN OF CARE
[facility]  Physical Therapy Initial Evaluation    Name: Katherine Rivers  Clinic Number: [id number]    Therapy Diagnosis:   Encounter Diagnoses   Name Primary?    Acute left-sided low back pain with left-sided sciatica     Herniated lumbar intervertebral disc     Acute left-sided low back pain without sciatica     Decreased range of motion of lumbar spine     Weakness        Physician: Renuka Roger PA-C    Physician Orders: PT Eval and Treat   Medical Diagnosis from Referral:   M54.42 (ICD-10-CM) - Acute left-sided low back pain with left-sided sciatica   M51.26 (ICD-10-CM) - Herniated lumbar intervertebral disc     Evaluation Date: 4/28/2023  Authorization Period Expiration: 12/31/2023  Plan of Care Expiration: 6/28/2023  Visit # / Visits authorized: 1/ 20    Time In: 2:15 pm  Time Out: 3:05 pm  Total Billable Time: 50 minutes    Precautions: Standard and B hearing loss(reads lips), osteopenia, DM    Subjective   Date of onset: 3 weeks ago  History of current condition - Katherine reports: She was exercising at the gym on the row machine and noticed she had onset of low back pain with pain to her left hip. She continued to perform some exercises and it was manageable that day. Pain increased the next morning. She experienced some tingling down her left leg. Currently experiencing some numbness in left foot and ankle region. No history of back pain prior. She received a steroid injection a couple days after the initial injury without improvement in symptoms. She then received muscle relaxer with some relief. She was sent to ER by the physician due to inability to empty her bladder(history of difficulty emptying bladder). Was performing self catheterization at home for about a week but is not currently. Has noticed pain had subsided some but still has numbness in foot and ankle on L and pain to back and left hip with forward flexion.     Medical History:   Past Medical History:   Diagnosis  Date    Bilateral hearing loss     wears hearing aides    Cystocele     Diabetes mellitus     dx 57    Diverticular disease     General anesthetics causing adverse effect in therapeutic use     faints after surgery    GERD (gastroesophageal reflux disease)     H/O esophagogastroduodenoscopy     normal 3/16    Hyperlipidemia LDL goal < 100     Hypertension     Irritable bowel syndrome     Osteopenia     Dexa 4/11 and 7/15 adn 11/18       Surgical History:   Katherine Rivers  has a past surgical history that includes Belt abdominoplasty; Cervical polypectomy; Bladder suspension (2/23/12); Vaginal delivery; Bilateral salpingoophorectomy; Hysterectomy (2011); Oophorectomy (2011); Colporrhaphy; Vaginal hysterectomy w/ anterior and posterior vaginal repair (05/20/14); Colonoscopy (1/2006); dental implant; Colonoscopy (N/A, 3/8/2016); Cholecystectomy; Tubal ligation; and Colonoscopy (N/A, 10/18/2022).    Medications:   Katherine has a current medication list which includes the following prescription(s): amitriptyline, vitamin c (ascorbate calcium), atorvastatin, blood sugar diagnostic, blood-glucose meter, colesevelam, estradiol, gabapentin, l. acidophilus/bifid. animalis, lancets, lisinopril, multivitamin with minerals, nitrofurantoin (macrocrystal-monohydrate), omeprazole, janumet, tamsulosin, tretinoin, and UNABLE TO FIND.    Allergies:   Review of patient's allergies indicates:   Allergen Reactions    No known drug allergies         Imaging, MRI studies: Lumbar  Impression:     1. No acute osseous abnormality is identified.  2. Left lateral recess level focal disc protrusion with annular fissure L4-L5 level causes mild left lateral recess narrowing.  No significant central spinal canal narrowing noted at this level or additional levels of the lumbar spine.  No significant neural foraminal narrowing noted within the lumbar spine.  Additional mild degenerative changes as above.    Prior Therapy: yes for pelvic floor  Social  History:  lives with their spouse, single story home  Occupation: retired   Prior Level of Function: independent  Current Level of Function: independent with pain to low back and L hip, numbness/tingling to L foot(not there prior to back injury)    Pain:  Current 3/10, worst 10/10, best 2/10   Location: left back and hip  Description: Aching, Dull, and Numb  Aggravating Factors: the morning time  Easing Factors: muscle relaxer but not currently taking due to side effect symptoms    Pts goals: reduce pain, return to exercise without restrictions     Objective       Observation: pt is healthy and appears in good shape. She requires face to face interaction to read my lips    Posture Alignment: forward head;;slight kyphosis     Sensation: impaired to light touch on L ankle/foot    GAIT DEVIATIONS: Katherine displays slight antalgic gait    Lumbar Range of Motion:    %   Flexion 50%*     Extension WNL     Left Side Bending WNL   Right Side Bending 75%*   Left rotation   75%   Right Rotation   75%    *= pain  Lumbar  Strength Increased Pain?   Flexion 4/5  N   Extension 4/5  N   Side Bending Right 4/5  N   Side Bending Left 4/5  N   *Tested with patient seated      Lower Extremity Strength    Right LE  Left LE    Hip flexion: 3+/5 Hip flexion: 3+/5   Knee extension: 4+/5 Knee extension: 4+/5   Knee flexion: 4/5 Knee flexion: 4/5   Hip IR: 4/5 Hip IR: 4/5   Hip ER: 4/5 Hip ER: 4/5   Hip extension:  4-/5 Hip extension: 4-/5   Hip abduction: 4-/5 Hip abduction: 4-/5   Hip adduction: NT Hip adduction NT   Ankle dorsiflexion: 5/5 Ankle dorsiflexion: 5/5   Ankle plantarflexion: 5/5 Ankle plantarflexion: 5/5     Special Tests:  -Quadrant testing: negative  -ZECHARIAH: positive  -Scour: negative  -Repeated Flexion: positive  -Repeated Ext:negative  -Piriformis Test: positive on L  -Prone Instability Test: NT  -Bridge Test: negative  -Sustained extension: negative  -Heel walking: negative  -Toe walking: negative  -Long sitting  "test:positive: anterior innominate on L      SI provocation test:  Distraction test: negative  Compression test: negative  Thigh thrust test: positive  Gaenslens Right side tests: negative  Gaenslens Left side test: positive  Sacral thrust test: positive  3 out of all 6 provocation have sensitivity of .94 and specificity .78 for SIJ pathology     -Gillet's: positive on L      Neuro Dynamic Testing:    Sciatic nerve:      SLR: negative    Tibial bias: negative    Common Peroneal bias: negative   Femoral Nerve:    Femoral nerve test: negative   Neural Tension:     Slump test: negative    Joint Mobility: normal mobility in lumbar spine    Palpation: TTP on L piriformis    Flexibility: L piriformis tightness, hamstring tightness     PT Evaluation Completed? Yes  Discussed Plan of Care with patient: Yes        CMS Impairment/Limitation/Restriction for FOTO Lumbar Survey    Therapist reviewed FOTO scores for Katherine Rivers on 4/28/2023.   FOTO documents entered into [employer] - see Media section.    Limitation Score: %  Category:     Current :   Goal:   Discharge:          TREATMENT   Treatment Time In: 2:40 pm  Treatment Time Out: 3:05 pm  Total Treatment time separate from Evaluation: 25 minutes    Katherine received therapeutic exercises to develop strength, ROM, and flexibility for 15 minutes including:    Prone lying  Prone press ups x 10  MET for anterior innominate correction 5 x 3"  Piriformis stretch 3 x 20" B  Bridges x 15      Katherine received the following manual therapy techniques: Joint mobilizations and Manual traction were applied to the: L hip for 10 minutes, including:    Long axis distraction of L hip  Posterior rotation of L innominate      Home Exercises and Patient Education Provided    Education provided:   - continue to use pillow between knees when sleeping on side  - do not prop one leg up during sleep to avoid rotating pelvis  -perform MET frequently throughout day    Written Home Exercises Provided: yes. " Given verbal instructions. Need to print a copy of HEP due to printer issues at [facility].  Exercises were reviewed and Katherine was able to demonstrate them prior to the end of the session.  Katherine demonstrated good  understanding of the education provided.     See EMR under Patient Instructions for exercises provided 4/28/2023.    Assessment   Katherine is a 73 y.o. female referred to outpatient Physical Therapy with a medical diagnosis of acute left sided low back pain with left sided sciatica and herniated disc of lumbar. Pt presents with left sided low back pain with radicular symptoms to L hip, numbness and tingling to L foot, anterior rotation of L innominate, weakness in core musculature and gluteus muscles, and decrease in lumbar range of motion. Pt's pain is currently limiting her workout routine and community outings. She demonstrates extension bias which relieves numbness in L foot/ankle. Signs and symptoms consistent with medication diagnosis of herniated disc. She is appropriate for physical therapy to address deficits listed above and return to prior level of function.     Pt prognosis is Good.   Pt will benefit from skilled outpatient Physical Therapy to address the deficits stated above and in the chart below, provide pt/family education, and to maximize pt's level of independence.     Plan of care discussed with patient: Yes  Pt's spiritual, cultural and educational needs considered and patient is agreeable to the plan of care and goals as stated below:     Anticipated Barriers for therapy: age, osteopenia    Medical Necessity is demonstrated by the following  History  Co-morbidities and personal factors that may impact the plan of care Co-morbidities:   advanced age, diabetes, and osteopenia, IBS, HTN    Personal Factors:   age  Difficulty emptying bladder(history of bladder difficulties)     high   Examination  Body Structures and Functions, activity limitations and participation restrictions that may impact  the plan of care Body Regions:   back  lower extremities  trunk    Body Systems:    ROM  strength  balance  gait  transfers  transitions    Participation Restrictions:   none    Activity limitations:   Learning and applying knowledge  no deficits    General Tasks and Commands  no deficits    Communication  Hard of hearing B- needs to read lips    Mobility  lifting and carrying objects  walking  driving (bike, car, motorcycle)  Stairs, workingout    Self care  looking after one's health    Domestic Life  doing house work (cleaning house, washing dishes, laundry)    Interactions/Relationships  no deficits    Life Areas  no deficits    Community and Social Life  community life  recreation and leisure         Complexity: mod  See FOTO outcome assessment   Clinical Presentation evolving clinical presentation with changing clinical characteristics mod   Decision Making/ Complexity Score: mod     GOALS: Short Term Goals:  4 weeks  1. Report decreased in pain at worse less than  <   / =  5 /10  to increase tolerance for functional activities  2. Pt to be able to demonstrate ability to self manage SIJ dysfunction.  3. Increased BLE MMT 1/2  to increase tolerance for ADL and work activities.  4. Pt to tolerate HEP to improve ROM and independence with ADL's  5. Pt to improve Lumbar range of motion by 25% to allow for improved functional mobility to allow for improvement in IADLs.     Long Term Goals: 8 weeks  1. Report decreased in pain at worse less than  <   / =  1 /10  to increase tolerance for functional mobility.   2 .Pt to report centralization of symptoms.  3. Increased BLE MMT 1 grade to increase tolerance for ADL and work activities.  4. Pt will report ability to return to regular workout routine without limitations.  5. Pt to improve range of motion by 50% to allow for improved functional mobility to allow for improvement in IADLs.       Plan   Plan of care Certification: 4/28/2023 to 6/28/2023.    Outpatient  Physical Therapy 2 times weekly for 8 weeks to include the following interventions: Cervical/Lumbar Traction, Electrical Stimulation TENs, Gait Training, Manual Therapy, Moist Heat/ Ice, Neuromuscular Re-ed, Patient Education, Therapeutic Activities, and Therapeutic Exercise.     Anastasiya Rubio, PT

## 2023-05-02 ENCOUNTER — CLINICAL SUPPORT (OUTPATIENT)
Dept: REHABILITATION | Facility: HOSPITAL | Age: 73
End: 2023-05-02
Payer: MEDICARE

## 2023-05-02 ENCOUNTER — TELEPHONE (OUTPATIENT)
Dept: FAMILY MEDICINE | Facility: CLINIC | Age: 73
End: 2023-05-02
Payer: MEDICARE

## 2023-05-02 DIAGNOSIS — M54.50 ACUTE LEFT-SIDED LOW BACK PAIN WITHOUT SCIATICA: Primary | ICD-10-CM

## 2023-05-02 DIAGNOSIS — M53.86 DECREASED RANGE OF MOTION OF LUMBAR SPINE: ICD-10-CM

## 2023-05-02 DIAGNOSIS — R53.1 WEAKNESS: ICD-10-CM

## 2023-05-02 PROCEDURE — 97140 MANUAL THERAPY 1/> REGIONS: CPT | Mod: PO

## 2023-05-02 PROCEDURE — 97110 THERAPEUTIC EXERCISES: CPT | Mod: PO

## 2023-05-02 PROCEDURE — 97112 NEUROMUSCULAR REEDUCATION: CPT | Mod: PO

## 2023-05-02 NOTE — PROGRESS NOTES
"  Physical Therapy Daily Treatment Note     Name: Katherine Rivers  Clinic Number: [id number]    Therapy Diagnosis:   Encounter Diagnoses   Name Primary?    Acute left-sided low back pain without sciatica Yes    Decreased range of motion of lumbar spine     Weakness        Physician: Renuka Roger PA-C    Visit Date: 5/2/2023    Physician Orders: PT Eval and Treat   Medical Diagnosis from Referral:   M54.42 (ICD-10-CM) - Acute left-sided low back pain with left-sided sciatica   M51.26 (ICD-10-CM) - Herniated lumbar intervertebral disc      Evaluation Date: 4/28/2023  Authorization Period Expiration: 12/31/2023  Plan of Care Expiration: 6/28/2023  Visit # / Visits authorized: 2/ 20     Time In: 2:00 pm  Time Out: 2:46 pm  Total Billable Time: 46 minutes    Precautions: Standard and B hearing loss(reads lips), osteopenia, DM     Subjective     Pt reports: reduced pain in lower extremity and low back since start of exercises.  She was semi-compliant with home exercise program due to not having a print out.(Printed copy given today)  Response to previous treatment: reduced pain  Functional change: too soon    Pain: 2/10  Location: left back     Objective     Katherine received therapeutic exercises to develop strength, endurance, ROM, flexibility, posture, and core stabilization for 20 minutes including:    Prone lying  Prone press ups x 10  MET for anterior innominate correction 5 x 3"  Piriformis stretch 3 x 20" B  Bridges x 15  TrA with BKFO, RTB x 10 each    Katherine received the following manual therapy techniques: Joint mobilizations, Manual traction, and Soft tissue Mobilization were applied to the: L low back, SIJ for 16 minutes, including:    Long axis distraction of L hip  Posterior rotation of L innominate  L piriformis release       Katherine participated in neuromuscular re-education activities to improve: Kinesthetic, Proprioception, and Posture for 10 minutes. The following activities were included:    TrA with red " "ball 10 x 5"  Pelvic tilts x 15  Attempted 90/90 heel taps but unable to maintain core bracing        Home Exercises Provided and Patient Education Provided     Education provided:   Cont to perform HEP as provided.     Written Home Exercises Provided: yes.  Exercises were reviewed and Katherine was able to demonstrate them prior to the end of the session.  Katherine demonstrated good  understanding of the education provided.     See EMR under Patient Instructions for exercises provided 5/2/2023.    Assessment     Katherine tolerated full initial treatment well. Improvement in discomfort reported and tenderness to palpation of piriformis post manual treatment and exercises. Progressed core strength and stability without peripheralization of symptoms.     Katherine Is progressing well towards her goals.   Pt prognosis is Good.     Pt will continue to benefit from skilled outpatient physical therapy to address the deficits listed in the problem list box on initial evaluation, provide pt/family education and to maximize pt's level of independence in the home and community environment.     Pt's spiritual, cultural and educational needs considered and pt agreeable to plan of care and goals.    Anticipated barriers to physical therapy: age, osteopenia     Goals: Short Term Goals:  4 weeks  1. Report decreased in pain at worse less than  <   / =  5 /10  to increase tolerance for functional activities  2. Pt to be able to demonstrate ability to self manage SIJ dysfunction.  3. Increased BLE MMT 1/2  to increase tolerance for ADL and work activities.  4. Pt to tolerate HEP to improve ROM and independence with ADL's  5. Pt to improve Lumbar range of motion by 25% to allow for improved functional mobility to allow for improvement in IADLs.      Long Term Goals: 8 weeks  1. Report decreased in pain at worse less than  <   / =  1 /10  to increase tolerance for functional mobility.   2 .Pt to report centralization of symptoms.  3. Increased BLE " MMT 1 grade to increase tolerance for ADL and work activities.  4. Pt will report ability to return to regular workout routine without limitations.  5. Pt to improve range of motion by 50% to allow for improved functional mobility to allow for improvement in IADLs.        Plan     Certification date: 4/28/2023 to 6/28/2023.     Outpatient Physical Therapy 2 times weekly for 8 weeks to include the following interventions: Cervical/Lumbar Traction, Electrical Stimulation TENs, Gait Training, Manual Therapy, Moist Heat/ Ice, Neuromuscular Re-ed, Patient Education, Therapeutic Activities, and Therapeutic Exercise.     Cont skilled PT session towards PT and patient's goals.    Anastasiya Rubio, PT   05/02/2023

## 2023-05-03 NOTE — TELEPHONE ENCOUNTER
----- Message from Vi Gloria sent at 5/2/2023  3:15 PM CDT -----  Contact: [facility]  Type:  Pharmacy Calling to Clarify an RX    Name of Caller:  Fernie  Pharmacy Name:  [facility]  Prescription Name:  Lancets & test strips  What do they need to clarify?:  There's no history of glucose maching and the lancets & test strips are mismatched, want to make sure we get the correct ones out to pt  Best Call Back Number:  [phone] ref # [id number]  Additional Information:  Please call back to clarify. Thank You.

## 2023-05-03 NOTE — TELEPHONE ENCOUNTER
Please advise. In previous encounter it was mentioned she uses different test strips. Please advise.       Stated she uses True matrix NFRS test strips

## 2023-05-09 ENCOUNTER — CLINICAL SUPPORT (OUTPATIENT)
Dept: REHABILITATION | Facility: HOSPITAL | Age: 73
End: 2023-05-09
Payer: MEDICARE

## 2023-05-09 DIAGNOSIS — M54.50 ACUTE LEFT-SIDED LOW BACK PAIN WITHOUT SCIATICA: Primary | ICD-10-CM

## 2023-05-09 DIAGNOSIS — M53.86 DECREASED RANGE OF MOTION OF LUMBAR SPINE: ICD-10-CM

## 2023-05-09 DIAGNOSIS — R53.1 WEAKNESS: ICD-10-CM

## 2023-05-09 PROCEDURE — 97110 THERAPEUTIC EXERCISES: CPT | Mod: PO

## 2023-05-09 PROCEDURE — 97112 NEUROMUSCULAR REEDUCATION: CPT | Mod: PO

## 2023-05-09 PROCEDURE — 97140 MANUAL THERAPY 1/> REGIONS: CPT | Mod: PO

## 2023-05-09 NOTE — PROGRESS NOTES
"  Physical Therapy Daily Treatment Note     Name: Katherine Rivers  Clinic Number: [id number]    Therapy Diagnosis:   Encounter Diagnoses   Name Primary?    Acute left-sided low back pain without sciatica Yes    Decreased range of motion of lumbar spine     Weakness        Physician: Renuka Roger PA-C    Visit Date: 5/9/2023    Physician Orders: PT Eval and Treat   Medical Diagnosis from Referral:   M54.42 (ICD-10-CM) - Acute left-sided low back pain with left-sided sciatica   M51.26 (ICD-10-CM) - Herniated lumbar intervertebral disc      Evaluation Date: 4/28/2023  Authorization Period Expiration: 12/31/2023  Plan of Care Expiration: 6/28/2023  Visit # / Visits authorized: 3/ 20     Time In: 3:40 pm  Time Out: 4:27 pm  Total Billable Time: 47 minutes    Precautions: Standard and B hearing loss(reads lips), osteopenia, DM     Subjective     Pt reports: feeling good today, a bit sore from performing exercises at home. She believes she may have over done it.  She was compliant with home exercise program.  Response to previous treatment: reduced pain  Functional change: no radicular pain, 90% improvement in pain,     Pain: 1/10  Location: left back     Objective     Katherine received therapeutic exercises to develop strength, endurance, ROM, flexibility, posture, and core stabilization for 24 minutes including:    Prone lying  Prone press ups x 10  MET for anterior innominate correction 5 x 3"  Piriformis stretch 3 x 20" B  Bridges 2 x 10, RTB around knees for hip abd  TrA with BKFO, RTB  x 10 each    +MedX lumbar extension: 30 lbs, 60 deg x 15  +Precor Leg press: 20 lbs x 15    Katherine received the following manual therapy techniques: Joint mobilizations, Manual traction, and Soft tissue Mobilization were applied to the: L low back, SIJ for 8 minutes, including:    Posterior rotation of L innominate-NP today  L piriformis release       Katherine participated in neuromuscular re-education activities to improve: Kinesthetic, " "Proprioception, and Posture for 15 minutes. The following activities were included:    TrA with red ball 10 x 5"  Pelvic tilts x 15  +Bird/dogs x 10 each          Home Exercises Provided and Patient Education Provided     Education provided:   Cont to perform HEP as provided.     Written Home Exercises Provided: yes.  Exercises were reviewed and Katherine was able to demonstrate them prior to the end of the session.  Katherine demonstrated good  understanding of the education provided.     See EMR under Patient Instructions for exercises provided 5/2/2023.    Assessment     Katherine arrives with reduction in radicular symptoms by 90%. No rotation of left innominate present today throughout exercises. Progressed core and hip strengthening with resistance on bridging and bird/dog with alternating LE extension. Progressed lumbar strength with use of MedX machine without adverse response. Pt is having cartaract surgery one left then right eye over the next couple of weeks and will not attend PT. Will leave POC open for 4 weeks to determine need following surgery.     Katherine Is progressing well towards her goals.   Pt prognosis is Good.     Pt will continue to benefit from skilled outpatient physical therapy to address the deficits listed in the problem list box on initial evaluation, provide pt/family education and to maximize pt's level of independence in the home and community environment.     Pt's spiritual, cultural and educational needs considered and pt agreeable to plan of care and goals.    Anticipated barriers to physical therapy: age, osteopenia     Goals: Short Term Goals:  4 weeks  1. Report decreased in pain at worse less than  <   / =  5 /10  to increase tolerance for functional activitiesMet 5/9/2023  2. Pt to be able to demonstrate ability to self manage SIJ dysfunction.Met 5/9/2023  3. Increased BLE MMT 1/2  to increase tolerance for ADL and work activities.  4. Pt to tolerate HEP to improve ROM and independence " with ADL'sMet 5/9/2023  5. Pt to improve Lumbar range of motion by 25% to allow for improved functional mobility to allow for improvement in IADLs.      Long Term Goals: 8 weeks  1. Report decreased in pain at worse less than  <   / =  1 /10  to increase tolerance for functional mobility.   2 .Pt to report centralization of symptoms.Met 5/9/2023  3. Increased BLE MMT 1 grade to increase tolerance for ADL and work activities.  4. Pt will report ability to return to regular workout routine without limitations.  5. Pt to improve range of motion by 50% to allow for improved functional mobility to allow for improvement in IADLs.        Plan     Certification date: 4/28/2023 to 6/28/2023.     Outpatient Physical Therapy 2 times weekly for 8 weeks to include the following interventions: Cervical/Lumbar Traction, Electrical Stimulation TENs, Gait Training, Manual Therapy, Moist Heat/ Ice, Neuromuscular Re-ed, Patient Education, Therapeutic Activities, and Therapeutic Exercise.     Cont skilled PT session towards PT and patient's goals.    Anastasiya Rubio, PT   05/09/2023

## 2023-06-01 ENCOUNTER — PES CALL (OUTPATIENT)
Dept: ADMINISTRATIVE | Facility: CLINIC | Age: 73
End: 2023-06-01
Payer: MEDICARE

## 2023-06-07 ENCOUNTER — PES CALL (OUTPATIENT)
Dept: ADMINISTRATIVE | Facility: CLINIC | Age: 73
End: 2023-06-07
Payer: MEDICARE

## 2023-06-23 ENCOUNTER — OFFICE VISIT (OUTPATIENT)
Dept: HOME HEALTH SERVICES | Facility: CLINIC | Age: 73
End: 2023-06-23
Payer: MEDICARE

## 2023-06-23 VITALS
SYSTOLIC BLOOD PRESSURE: 131 MMHG | WEIGHT: 134 LBS | OXYGEN SATURATION: 97 % | HEART RATE: 86 BPM | BODY MASS INDEX: 22.33 KG/M2 | DIASTOLIC BLOOD PRESSURE: 90 MMHG | HEIGHT: 65 IN

## 2023-06-23 DIAGNOSIS — E11.9 TYPE 2 DIABETES MELLITUS WITHOUT COMPLICATION, WITHOUT LONG-TERM CURRENT USE OF INSULIN: ICD-10-CM

## 2023-06-23 DIAGNOSIS — E78.2 MIXED HYPERLIPIDEMIA: ICD-10-CM

## 2023-06-23 DIAGNOSIS — M85.80 OSTEOPENIA, UNSPECIFIED LOCATION: ICD-10-CM

## 2023-06-23 DIAGNOSIS — Z00.00 ENCOUNTER FOR PREVENTIVE HEALTH EXAMINATION: Primary | ICD-10-CM

## 2023-06-23 DIAGNOSIS — H91.93 BILATERAL HEARING LOSS, UNSPECIFIED HEARING LOSS TYPE: ICD-10-CM

## 2023-06-23 DIAGNOSIS — I15.2 HYPERTENSION ASSOCIATED WITH DIABETES: ICD-10-CM

## 2023-06-23 DIAGNOSIS — E11.59 HYPERTENSION ASSOCIATED WITH DIABETES: ICD-10-CM

## 2023-06-23 PROCEDURE — 1160F PR REVIEW ALL MEDS BY PRESCRIBER/CLIN PHARMACIST DOCUMENTED: ICD-10-PCS | Mod: CPTII,S$GLB,, | Performed by: NURSE PRACTITIONER

## 2023-06-23 PROCEDURE — G0439 PR MEDICARE ANNUAL WELLNESS SUBSEQUENT VISIT: ICD-10-PCS | Mod: S$GLB,,, | Performed by: NURSE PRACTITIONER

## 2023-06-23 PROCEDURE — 3288F FALL RISK ASSESSMENT DOCD: CPT | Mod: CPTII,S$GLB,, | Performed by: NURSE PRACTITIONER

## 2023-06-23 PROCEDURE — 3061F PR NEG MICROALBUMINURIA RESULT DOCUMENTED/REVIEW: ICD-10-PCS | Mod: CPTII,S$GLB,, | Performed by: NURSE PRACTITIONER

## 2023-06-23 PROCEDURE — 3080F PR MOST RECENT DIASTOLIC BLOOD PRESSURE >= 90 MM HG: ICD-10-PCS | Mod: CPTII,S$GLB,, | Performed by: NURSE PRACTITIONER

## 2023-06-23 PROCEDURE — 3008F PR BODY MASS INDEX (BMI) DOCUMENTED: ICD-10-PCS | Mod: CPTII,S$GLB,, | Performed by: NURSE PRACTITIONER

## 2023-06-23 PROCEDURE — 1157F PR ADVANCE CARE PLAN OR EQUIV PRESENT IN MEDICAL RECORD: ICD-10-PCS | Mod: CPTII,S$GLB,, | Performed by: NURSE PRACTITIONER

## 2023-06-23 PROCEDURE — G0439 PPPS, SUBSEQ VISIT: HCPCS | Mod: S$GLB,,, | Performed by: NURSE PRACTITIONER

## 2023-06-23 PROCEDURE — 3066F NEPHROPATHY DOC TX: CPT | Mod: CPTII,S$GLB,, | Performed by: NURSE PRACTITIONER

## 2023-06-23 PROCEDURE — 3288F PR FALLS RISK ASSESSMENT DOCUMENTED: ICD-10-PCS | Mod: CPTII,S$GLB,, | Performed by: NURSE PRACTITIONER

## 2023-06-23 PROCEDURE — 3044F HG A1C LEVEL LT 7.0%: CPT | Mod: CPTII,S$GLB,, | Performed by: NURSE PRACTITIONER

## 2023-06-23 PROCEDURE — 4010F ACE/ARB THERAPY RXD/TAKEN: CPT | Mod: CPTII,S$GLB,, | Performed by: NURSE PRACTITIONER

## 2023-06-23 PROCEDURE — 1159F PR MEDICATION LIST DOCUMENTED IN MEDICAL RECORD: ICD-10-PCS | Mod: CPTII,S$GLB,, | Performed by: NURSE PRACTITIONER

## 2023-06-23 PROCEDURE — 3044F PR MOST RECENT HEMOGLOBIN A1C LEVEL <7.0%: ICD-10-PCS | Mod: CPTII,S$GLB,, | Performed by: NURSE PRACTITIONER

## 2023-06-23 PROCEDURE — 1160F RVW MEDS BY RX/DR IN RCRD: CPT | Mod: CPTII,S$GLB,, | Performed by: NURSE PRACTITIONER

## 2023-06-23 PROCEDURE — 1101F PR PT FALLS ASSESS DOC 0-1 FALLS W/OUT INJ PAST YR: ICD-10-PCS | Mod: CPTII,S$GLB,, | Performed by: NURSE PRACTITIONER

## 2023-06-23 PROCEDURE — 3075F SYST BP GE 130 - 139MM HG: CPT | Mod: CPTII,S$GLB,, | Performed by: NURSE PRACTITIONER

## 2023-06-23 PROCEDURE — 3061F NEG MICROALBUMINURIA REV: CPT | Mod: CPTII,S$GLB,, | Performed by: NURSE PRACTITIONER

## 2023-06-23 PROCEDURE — 3066F PR DOCUMENTATION OF TREATMENT FOR NEPHROPATHY: ICD-10-PCS | Mod: CPTII,S$GLB,, | Performed by: NURSE PRACTITIONER

## 2023-06-23 PROCEDURE — 3080F DIAST BP >= 90 MM HG: CPT | Mod: CPTII,S$GLB,, | Performed by: NURSE PRACTITIONER

## 2023-06-23 PROCEDURE — 4010F PR ACE/ARB THEARPY RXD/TAKEN: ICD-10-PCS | Mod: CPTII,S$GLB,, | Performed by: NURSE PRACTITIONER

## 2023-06-23 PROCEDURE — 3075F PR MOST RECENT SYSTOLIC BLOOD PRESS GE 130-139MM HG: ICD-10-PCS | Mod: CPTII,S$GLB,, | Performed by: NURSE PRACTITIONER

## 2023-06-23 PROCEDURE — 3008F BODY MASS INDEX DOCD: CPT | Mod: CPTII,S$GLB,, | Performed by: NURSE PRACTITIONER

## 2023-06-23 PROCEDURE — 1159F MED LIST DOCD IN RCRD: CPT | Mod: CPTII,S$GLB,, | Performed by: NURSE PRACTITIONER

## 2023-06-23 PROCEDURE — 1157F ADVNC CARE PLAN IN RCRD: CPT | Mod: CPTII,S$GLB,, | Performed by: NURSE PRACTITIONER

## 2023-06-23 PROCEDURE — 1101F PT FALLS ASSESS-DOCD LE1/YR: CPT | Mod: CPTII,S$GLB,, | Performed by: NURSE PRACTITIONER

## 2023-06-24 PROBLEM — H91.93 BILATERAL HEARING LOSS: Status: ACTIVE | Noted: 2023-06-24

## 2023-06-24 NOTE — PROGRESS NOTES
"Katherine Rivers presented for a  Medicare AWV and comprehensive Health Risk Assessment today. The following components were reviewed and updated:    Medical history  Family History  Social history  Allergies and Current Medications  Health Risk Assessment  Health Maintenance  Care Team         ** See Completed Assessments for Annual Wellness Visit within the encounter summary.**         The following assessments were completed:  Living Situation  CAGE  Depression Screening  Timed Get Up and Go  Whisper Test  Cognitive Function Screening  Nutrition Screening  ADL Screening  PAQ Screening        Vitals:    06/23/23 0843   BP: (!) 131/90   Pulse: 86   SpO2: 97%   Weight: 60.8 kg (134 lb)   Height: 5' 5" (1.651 m)     Body mass index is 22.3 kg/m².  Physical Exam  Constitutional:       Appearance: Normal appearance.   HENT:      Head: Normocephalic and atraumatic.      Nose: Nose normal.   Eyes:      Extraocular Movements: Extraocular movements intact.      Pupils: Pupils are equal, round, and reactive to light.   Cardiovascular:      Rate and Rhythm: Normal rate.      Pulses: Normal pulses.   Pulmonary:      Effort: Pulmonary effort is normal.   Musculoskeletal:      Cervical back: Normal range of motion and neck supple.   Neurological:      General: No focal deficit present.      Mental Status: She is alert and oriented to person, place, and time.             Diagnoses and health risks identified today and associated recommendations/orders:    1. Encounter for preventive health examination  Awv completed      2. Hypertension associated with diabetes  Chronic and stable. Continue current treatment. Follow with PCP.  On meds - stable    3. Type 2 diabetes mellitus without complication, without long-term current use of insulin  Chronic and stable. Continue current treatment. Follow with PCP.  On meds     4. Mixed hyperlipidemia  Chronic and stable. Continue current treatment. Follow with PCP.      5. Osteopenia, " unspecified location  Chronic and stable. Continue current treatment. Follow with PCP.  On vit d      6. BMI 22.0-22.9, adult  Chronic and stable. Continue current treatment. Follow with PCP.      7. Bilateral hearing loss, unspecified hearing loss type  Chronic and stable. Continue current treatment. Follow with PCP.  Hearing carol        Provided Katherine with a 5-10 year written screening schedule and personal prevention plan. Recommendations were developed using the USPSTF age appropriate recommendations. Education, counseling, and referrals were provided as needed. After Visit Summary printed and given to patient which includes a list of additional screenings\tests needed.    Fu in 1 yr for AWV    .washington Cruz, SUM[place]  I offered to discuss advanced care planning, including how to pick a person who would make decisions for you if you were unable to make them for yourself, called a health care power of , and what kind of decisions you might make such as use of life sustaining treatments such as ventilators and tube feeding when faced with a life limiting illness recorded on a living will that they will need to know. (How you want to be cared for as you near the end of your natural life)     X  Patient has advanced directives on file, which we reviewed, and they do not wish to make changes.

## 2023-06-24 NOTE — PATIENT INSTRUCTIONS
Counseling and Referral of Other Preventative  (Italic type indicates deductible and co-insurance are waived)    Patient Name: Katherine Rivers  Today's Date: 6/24/2023    Health Maintenance       Date Due Completion Date    COVID-19 Vaccine (4 - Pfizer series) 12/06/2021 10/11/2021    Foot Exam 02/22/2023 2/22/2022    Override on 12/17/2014: Done    Lipid Panel 08/26/2023 8/26/2022    Hemoglobin A1c 09/01/2023 3/1/2023    Mammogram 10/28/2023 10/28/2022    Diabetes Urine Screening 03/01/2024 3/1/2023    Eye Exam 03/27/2024 3/27/2023    Override on 5/17/2017: Done    Override on 6/13/2016: Done (hypermetropia and presbyopia)    Override on 5/7/2015: Done (Dr. Swann)    Low Dose Statin 04/12/2024 4/12/2023    DEXA Scan 03/03/2025 3/3/2022    Colorectal Cancer Screening 10/18/2027 10/18/2022    Override on 1/5/2006: Done (Done by Dr Richard Mcmahon at [facility])    TETANUS VACCINE 10/25/2031 10/25/2021        No orders of the defined types were placed in this encounter.    The following information is provided to all patients.  This information is to help you find resources for any of the problems found today that may be affecting your health:                Living healthy guide: www.[facility].[place].gov      Understanding Diabetes: www.diabetes.org      Eating healthy: www.cdc.gov/healthyweight      CDC home safety checklist: www.cdc.gov/steadi/patient.html      Agency on Aging: www.goea.[place].[facility]      Alcoholics anonymous (AA): www.aa.org      Physical Activity: www.izabela.nih.gov/gn0zhvs      Tobacco use: www.quitwithusla.org     
EM[facility] 6 mariam

## 2023-06-30 ENCOUNTER — PATIENT MESSAGE (OUTPATIENT)
Dept: FAMILY MEDICINE | Facility: CLINIC | Age: 73
End: 2023-06-30
Payer: MEDICARE

## 2023-07-27 NOTE — TELEPHONE ENCOUNTER
Care Due:                  Date            Visit Type   Department     Provider  --------------------------------------------------------------------------------                                EP -                              PRIMARY      [facility] Jennifer Casillas  Last Visit: 03-      CARE ([facility])   MEDICINE       Anger                              EP -                              PRIMARY      [facility]    Jennifer Casillas  Next Visit: 09-      CARE ([facility])   MEDICINE       Anger                                                            Last  Test          Frequency    Reason                     Performed    Due Date  --------------------------------------------------------------------------------    HBA1C.......  6 months...  SITagliptan-metformin....  03- 08-    Lipid Panel.  12 months..  atorvastatin.............  08- 08-    Health [facility] Embedded Care Due Messages. Reference number: [id number].   7/27/2023 12:13:21 AM CDT

## 2023-07-30 RX ORDER — SITAGLIPTIN AND METFORMIN HYDROCHLORIDE 1000; 50 MG/1; MG/1
1 TABLET, FILM COATED ORAL DAILY
Qty: 90 TABLET | Refills: 1 | Status: SHIPPED | OUTPATIENT
Start: 2023-07-30 | End: 2024-03-28 | Stop reason: DRUGHIGH

## 2023-08-13 ENCOUNTER — PATIENT MESSAGE (OUTPATIENT)
Dept: FAMILY MEDICINE | Facility: CLINIC | Age: 73
End: 2023-08-13
Payer: MEDICARE

## 2023-08-22 ENCOUNTER — LAB VISIT (OUTPATIENT)
Dept: LAB | Facility: HOSPITAL | Age: 73
End: 2023-08-22
Attending: FAMILY MEDICINE
Payer: MEDICARE

## 2023-08-22 DIAGNOSIS — E11.8 DIABETES MELLITUS TYPE 2 WITH COMPLICATIONS: ICD-10-CM

## 2023-08-22 LAB
ALBUMIN SERPL BCP-MCNC: 3.9 G/DL (ref 3.5–5.2)
ALP SERPL-CCNC: 73 U/L (ref 55–135)
ALT SERPL W/O P-5'-P-CCNC: 25 U/L (ref 10–44)
ANION GAP SERPL CALC-SCNC: 9 MMOL/L (ref 8–16)
AST SERPL-CCNC: 23 U/L (ref 10–40)
BASOPHILS # BLD AUTO: 0.13 K/UL (ref 0–0.2)
BASOPHILS NFR BLD: 1.4 % (ref 0–1.9)
BILIRUB SERPL-MCNC: 0.6 MG/DL (ref 0.1–1)
BUN SERPL-MCNC: 16 MG/DL (ref 8–23)
CALCIUM SERPL-MCNC: 10.2 MG/DL (ref 8.7–10.5)
CHLORIDE SERPL-SCNC: 105 MMOL/L (ref 95–110)
CHOLEST SERPL-MCNC: 182 MG/DL (ref 120–199)
CHOLEST/HDLC SERPL: 3.5 {RATIO} (ref 2–5)
CO2 SERPL-SCNC: 25 MMOL/L (ref 23–29)
CREAT SERPL-MCNC: 0.9 MG/DL (ref 0.5–1.4)
DIFFERENTIAL METHOD: ABNORMAL
EOSINOPHIL # BLD AUTO: 0.2 K/UL (ref 0–0.5)
EOSINOPHIL NFR BLD: 2.3 % (ref 0–8)
ERYTHROCYTE [DISTWIDTH] IN BLOOD BY AUTOMATED COUNT: 13.3 % (ref 11.5–14.5)
EST. GFR  (NO RACE VARIABLE): >60 ML/MIN/1.73 M^2
ESTIMATED AVG GLUCOSE: 146 MG/DL (ref 68–131)
GLUCOSE SERPL-MCNC: 133 MG/DL (ref 70–110)
HBA1C MFR BLD: 6.7 % (ref 4–5.6)
HCT VFR BLD AUTO: 41.2 % (ref 37–48.5)
HDLC SERPL-MCNC: 52 MG/DL (ref 40–75)
HDLC SERPL: 28.6 % (ref 20–50)
HGB BLD-MCNC: 12.7 G/DL (ref 12–16)
IMM GRANULOCYTES # BLD AUTO: 0.04 K/UL (ref 0–0.04)
IMM GRANULOCYTES NFR BLD AUTO: 0.4 % (ref 0–0.5)
LDLC SERPL CALC-MCNC: 84.8 MG/DL (ref 63–159)
LYMPHOCYTES # BLD AUTO: 3.7 K/UL (ref 1–4.8)
LYMPHOCYTES NFR BLD: 39.3 % (ref 18–48)
MCH RBC QN AUTO: 27.1 PG (ref 27–31)
MCHC RBC AUTO-ENTMCNC: 30.8 G/DL (ref 32–36)
MCV RBC AUTO: 88 FL (ref 82–98)
MONOCYTES # BLD AUTO: 0.7 K/UL (ref 0.3–1)
MONOCYTES NFR BLD: 7.5 % (ref 4–15)
NEUTROPHILS # BLD AUTO: 4.6 K/UL (ref 1.8–7.7)
NEUTROPHILS NFR BLD: 49.1 % (ref 38–73)
NONHDLC SERPL-MCNC: 130 MG/DL
NRBC BLD-RTO: 0 /100 WBC
PLATELET # BLD AUTO: 370 K/UL (ref 150–450)
PMV BLD AUTO: 10.5 FL (ref 9.2–12.9)
POTASSIUM SERPL-SCNC: 4.8 MMOL/L (ref 3.5–5.1)
PROT SERPL-MCNC: 7.1 G/DL (ref 6–8.4)
RBC # BLD AUTO: 4.69 M/UL (ref 4–5.4)
SODIUM SERPL-SCNC: 139 MMOL/L (ref 136–145)
TRIGL SERPL-MCNC: 226 MG/DL (ref 30–150)
TSH SERPL DL<=0.005 MIU/L-ACNC: 1.39 UIU/ML (ref 0.4–4)
WBC # BLD AUTO: 9.28 K/UL (ref 3.9–12.7)

## 2023-08-22 PROCEDURE — 85025 COMPLETE CBC W/AUTO DIFF WBC: CPT | Performed by: FAMILY MEDICINE

## 2023-08-22 PROCEDURE — 80053 COMPREHEN METABOLIC PANEL: CPT | Performed by: FAMILY MEDICINE

## 2023-08-22 PROCEDURE — 84443 ASSAY THYROID STIM HORMONE: CPT | Performed by: FAMILY MEDICINE

## 2023-08-22 PROCEDURE — 36415 COLL VENOUS BLD VENIPUNCTURE: CPT | Mod: PO | Performed by: FAMILY MEDICINE

## 2023-08-22 PROCEDURE — 80061 LIPID PANEL: CPT | Performed by: FAMILY MEDICINE

## 2023-08-22 PROCEDURE — 83036 HEMOGLOBIN GLYCOSYLATED A1C: CPT | Performed by: FAMILY MEDICINE

## 2023-08-25 ENCOUNTER — PATIENT MESSAGE (OUTPATIENT)
Dept: FAMILY MEDICINE | Facility: CLINIC | Age: 73
End: 2023-08-25
Payer: MEDICARE

## 2023-09-11 RX ORDER — ESTRADIOL 10 UG/1
INSERT VAGINAL
Qty: 24 TABLET | Refills: 3 | Status: SHIPPED | OUTPATIENT
Start: 2023-09-11 | End: 2023-09-21 | Stop reason: SDUPTHER

## 2023-09-11 NOTE — TELEPHONE ENCOUNTER
Refill Routing Note     Refill Routing Note   Medication(s) are not appropriate for processing by [facility] for the following reason(s):      Medication outside of protocol    [facility] action(s):  Route Care Due:  None identified            Appointments  past 12m or future 3m with PCP    Date Provider   Last Visit   3/9/2023 Jennifer Casillas MD   Next Visit   9/21/2023 Jennifer Casillas MD   ED visits in past 90 days: 0        Note composed:3:58 AM 09/11/2023

## 2023-09-21 ENCOUNTER — OFFICE VISIT (OUTPATIENT)
Dept: FAMILY MEDICINE | Facility: CLINIC | Age: 73
End: 2023-09-21
Payer: MEDICARE

## 2023-09-21 VITALS
TEMPERATURE: 98 F | HEART RATE: 87 BPM | BODY MASS INDEX: 23.01 KG/M2 | WEIGHT: 143.19 LBS | DIASTOLIC BLOOD PRESSURE: 88 MMHG | OXYGEN SATURATION: 99 % | SYSTOLIC BLOOD PRESSURE: 134 MMHG | RESPIRATION RATE: 16 BRPM | HEIGHT: 66 IN

## 2023-09-21 DIAGNOSIS — E78.5 HYPERLIPIDEMIA ASSOCIATED WITH TYPE 2 DIABETES MELLITUS: ICD-10-CM

## 2023-09-21 DIAGNOSIS — I15.2 HYPERTENSION ASSOCIATED WITH DIABETES: ICD-10-CM

## 2023-09-21 DIAGNOSIS — R55 SYNCOPE, UNSPECIFIED SYNCOPE TYPE: ICD-10-CM

## 2023-09-21 DIAGNOSIS — Z12.39 ENCOUNTER FOR SCREENING FOR MALIGNANT NEOPLASM OF BREAST, UNSPECIFIED SCREENING MODALITY: ICD-10-CM

## 2023-09-21 DIAGNOSIS — E11.8 DIABETES MELLITUS TYPE 2 WITH COMPLICATIONS: Primary | ICD-10-CM

## 2023-09-21 DIAGNOSIS — Z12.31 ENCOUNTER FOR SCREENING MAMMOGRAM FOR MALIGNANT NEOPLASM OF BREAST: ICD-10-CM

## 2023-09-21 DIAGNOSIS — E11.69 HYPERLIPIDEMIA ASSOCIATED WITH TYPE 2 DIABETES MELLITUS: ICD-10-CM

## 2023-09-21 DIAGNOSIS — E11.59 HYPERTENSION ASSOCIATED WITH DIABETES: ICD-10-CM

## 2023-09-21 PROCEDURE — 3066F PR DOCUMENTATION OF TREATMENT FOR NEPHROPATHY: ICD-10-PCS | Mod: CPTII,S$GLB,, | Performed by: FAMILY MEDICINE

## 2023-09-21 PROCEDURE — 90694 FLU VACCINE - QUADRIVALENT - ADJUVANTED: ICD-10-PCS | Mod: S$GLB,,, | Performed by: FAMILY MEDICINE

## 2023-09-21 PROCEDURE — 4010F PR ACE/ARB THEARPY RXD/TAKEN: ICD-10-PCS | Mod: CPTII,S$GLB,, | Performed by: FAMILY MEDICINE

## 2023-09-21 PROCEDURE — 3008F PR BODY MASS INDEX (BMI) DOCUMENTED: ICD-10-PCS | Mod: CPTII,S$GLB,, | Performed by: FAMILY MEDICINE

## 2023-09-21 PROCEDURE — 90694 VACC AIIV4 NO PRSRV 0.5ML IM: CPT | Mod: S$GLB,,, | Performed by: FAMILY MEDICINE

## 2023-09-21 PROCEDURE — 1160F RVW MEDS BY RX/DR IN RCRD: CPT | Mod: CPTII,S$GLB,, | Performed by: FAMILY MEDICINE

## 2023-09-21 PROCEDURE — 1101F PT FALLS ASSESS-DOCD LE1/YR: CPT | Mod: CPTII,S$GLB,, | Performed by: FAMILY MEDICINE

## 2023-09-21 PROCEDURE — 1159F MED LIST DOCD IN RCRD: CPT | Mod: CPTII,S$GLB,, | Performed by: FAMILY MEDICINE

## 2023-09-21 PROCEDURE — 3075F PR MOST RECENT SYSTOLIC BLOOD PRESS GE 130-139MM HG: ICD-10-PCS | Mod: CPTII,S$GLB,, | Performed by: FAMILY MEDICINE

## 2023-09-21 PROCEDURE — 1157F ADVNC CARE PLAN IN RCRD: CPT | Mod: CPTII,S$GLB,, | Performed by: FAMILY MEDICINE

## 2023-09-21 PROCEDURE — 1159F PR MEDICATION LIST DOCUMENTED IN MEDICAL RECORD: ICD-10-PCS | Mod: CPTII,S$GLB,, | Performed by: FAMILY MEDICINE

## 2023-09-21 PROCEDURE — 1160F PR REVIEW ALL MEDS BY PRESCRIBER/CLIN PHARMACIST DOCUMENTED: ICD-10-PCS | Mod: CPTII,S$GLB,, | Performed by: FAMILY MEDICINE

## 2023-09-21 PROCEDURE — 3288F FALL RISK ASSESSMENT DOCD: CPT | Mod: CPTII,S$GLB,, | Performed by: FAMILY MEDICINE

## 2023-09-21 PROCEDURE — 3008F BODY MASS INDEX DOCD: CPT | Mod: CPTII,S$GLB,, | Performed by: FAMILY MEDICINE

## 2023-09-21 PROCEDURE — 3061F PR NEG MICROALBUMINURIA RESULT DOCUMENTED/REVIEW: ICD-10-PCS | Mod: CPTII,S$GLB,, | Performed by: FAMILY MEDICINE

## 2023-09-21 PROCEDURE — 3288F PR FALLS RISK ASSESSMENT DOCUMENTED: ICD-10-PCS | Mod: CPTII,S$GLB,, | Performed by: FAMILY MEDICINE

## 2023-09-21 PROCEDURE — 3044F PR MOST RECENT HEMOGLOBIN A1C LEVEL <7.0%: ICD-10-PCS | Mod: CPTII,S$GLB,, | Performed by: FAMILY MEDICINE

## 2023-09-21 PROCEDURE — 3044F HG A1C LEVEL LT 7.0%: CPT | Mod: CPTII,S$GLB,, | Performed by: FAMILY MEDICINE

## 2023-09-21 PROCEDURE — 3079F PR MOST RECENT DIASTOLIC BLOOD PRESSURE 80-89 MM HG: ICD-10-PCS | Mod: CPTII,S$GLB,, | Performed by: FAMILY MEDICINE

## 2023-09-21 PROCEDURE — 3075F SYST BP GE 130 - 139MM HG: CPT | Mod: CPTII,S$GLB,, | Performed by: FAMILY MEDICINE

## 2023-09-21 PROCEDURE — 4010F ACE/ARB THERAPY RXD/TAKEN: CPT | Mod: CPTII,S$GLB,, | Performed by: FAMILY MEDICINE

## 2023-09-21 PROCEDURE — 99214 PR OFFICE/OUTPT VISIT, EST, LEVL IV, 30-39 MIN: ICD-10-PCS | Mod: S$GLB,,, | Performed by: FAMILY MEDICINE

## 2023-09-21 PROCEDURE — 1126F AMNT PAIN NOTED NONE PRSNT: CPT | Mod: CPTII,S$GLB,, | Performed by: FAMILY MEDICINE

## 2023-09-21 PROCEDURE — G0008 FLU VACCINE - QUADRIVALENT - ADJUVANTED: ICD-10-PCS | Mod: S$GLB,,, | Performed by: FAMILY MEDICINE

## 2023-09-21 PROCEDURE — 3079F DIAST BP 80-89 MM HG: CPT | Mod: CPTII,S$GLB,, | Performed by: FAMILY MEDICINE

## 2023-09-21 PROCEDURE — 3061F NEG MICROALBUMINURIA REV: CPT | Mod: CPTII,S$GLB,, | Performed by: FAMILY MEDICINE

## 2023-09-21 PROCEDURE — 1101F PR PT FALLS ASSESS DOC 0-1 FALLS W/OUT INJ PAST YR: ICD-10-PCS | Mod: CPTII,S$GLB,, | Performed by: FAMILY MEDICINE

## 2023-09-21 PROCEDURE — 99214 OFFICE O/P EST MOD 30 MIN: CPT | Mod: S$GLB,,, | Performed by: FAMILY MEDICINE

## 2023-09-21 PROCEDURE — G0008 ADMIN INFLUENZA VIRUS VAC: HCPCS | Mod: S$GLB,,, | Performed by: FAMILY MEDICINE

## 2023-09-21 PROCEDURE — 1126F PR PAIN SEVERITY QUANTIFIED, NO PAIN PRESENT: ICD-10-PCS | Mod: CPTII,S$GLB,, | Performed by: FAMILY MEDICINE

## 2023-09-21 PROCEDURE — 3066F NEPHROPATHY DOC TX: CPT | Mod: CPTII,S$GLB,, | Performed by: FAMILY MEDICINE

## 2023-09-21 PROCEDURE — 1157F PR ADVANCE CARE PLAN OR EQUIV PRESENT IN MEDICAL RECORD: ICD-10-PCS | Mod: CPTII,S$GLB,, | Performed by: FAMILY MEDICINE

## 2023-09-21 RX ORDER — OMEPRAZOLE 10 MG/1
10 CAPSULE, DELAYED RELEASE ORAL DAILY
COMMUNITY
End: 2023-11-08 | Stop reason: SDUPTHER

## 2023-09-21 RX ORDER — ESTRADIOL 10 UG/1
1 INSERT VAGINAL
Qty: 8 TABLET | Refills: 4 | Status: SHIPPED | OUTPATIENT
Start: 2023-09-21 | End: 2024-03-28 | Stop reason: SDUPTHER

## 2023-09-24 ENCOUNTER — PATIENT MESSAGE (OUTPATIENT)
Dept: FAMILY MEDICINE | Facility: CLINIC | Age: 73
End: 2023-09-24
Payer: MEDICARE

## 2023-09-24 RX ORDER — INSULIN PUMP SYRINGE, 3 ML
EACH MISCELLANEOUS
Qty: 1 EACH | Refills: 0 | Status: SHIPPED | OUTPATIENT
Start: 2023-09-24

## 2023-09-24 RX ORDER — LANCETS
EACH MISCELLANEOUS
Qty: 100 EACH | Refills: 5 | Status: SHIPPED | OUTPATIENT
Start: 2023-09-24

## 2023-09-24 RX ORDER — INSULIN PUMP SYRINGE, 3 ML
EACH MISCELLANEOUS
Qty: 1 EACH | Refills: 0 | Status: SHIPPED | OUTPATIENT
Start: 2023-09-24 | End: 2024-09-20

## 2023-09-24 NOTE — PROGRESS NOTES
Subjective:       Patient ID: Katherine Rivers is a 73 y.o. female.    Chief Complaint: Follow-up    HPI  The patient is a 73-year-old who is here today for follow-up.  Overall, she is doing well.    Today we discussed the followin) [place] trip.  She went on a recent trip to [facility].  During the travels, she went 4 days without a bowel movement.   She was waiting for a ride at the side of the road and could tell that she was going to faint.  She instructed others to call for her  and they brought a chair to her.  She did faint but was caught by others who lowered her into the chair.  She regained consciousness briefly in the chair and then fainted again.  Both of her fainting spells lasted for a couple of seconds before she recovered.  (Of note, she does have a tendency for fainting and has always fainted since she was a child.  Things that trigger her fainting include pain, heat and not eating.  She can tell when she is going to faint and always gets to the floor so she does not injure herself.  Now that she is older, she faints less than she did when she was a child and typically faint once a year.)  She and her  decided not to go on the trip but to stay back.  Someone suggested that she take 3 Dulcolax for her constipation.  After taking the Dulcolax, she had a solid bowel movement and then had 6-8 episodes of explosive diarrhea.  The last couple of explosive diarrhea episodes contain some blood.  She estimates that the amount of blood in the two combined bowel movements was an 8th of a cup and she attributed the bleeding to her hemorrhoids.  Of note, she has not had any further issues with constipation or rectal bleeding and did previously have a normal colonoscopy in 2022  2) hypertension.  Today blood pressure is 134/88.  She is taking lisinopril which she is tolerating well  3) diabetes.  She is taking her Janumet regularly.  Her recent A1c was 6.7.  She is having some issues  with her glucometer supplies.  She has a different meter that is older and she is buying test strips because her insurance is sending her test strips for a new machine that she does not have  4)  Hyperlipidemia.  She is taking Lipitor consistently.  Her recent total cholesterol was 182, her triglycerides are 226 and her LDL was 84  4) next sciatica.  She recently had a bout of sciatica which was quite severe.  [facility].  She did receive medications  She did have imaging.  She has gone to physical therapy.  Her sciatica has completely resolved and is no longer present        Review of Systems   Constitutional:  Negative for appetite change, chills, diaphoresis, fatigue, fever and unexpected weight change.   HENT:  Positive for hearing loss. Negative for congestion, ear pain, postnasal drip, rhinorrhea, sinus pressure, sneezing, sore throat and trouble swallowing.    Eyes:  Negative for pain, discharge and visual disturbance.   Respiratory:  Negative for cough, chest tightness, shortness of breath and wheezing.    Cardiovascular:  Negative for chest pain, palpitations and leg swelling.   Gastrointestinal:  Negative for abdominal distention, abdominal pain, blood in stool, constipation, diarrhea, nausea and vomiting.   Skin:  Negative for rash.         Objective:      Physical Exam  Constitutional:       General: She is not in acute distress.     Appearance: Normal appearance. She is well-developed.   HENT:      Head: Normocephalic and atraumatic.      Right Ear: Tympanic membrane, ear canal and external ear normal. Decreased hearing noted.      Left Ear: Tympanic membrane, ear canal and external ear normal. Decreased hearing noted.      Nose: Nose normal.      Mouth/Throat:      Mouth: No oral lesions.      Pharynx: No oropharyngeal exudate or posterior oropharyngeal erythema.   Eyes:      General: Lids are normal. No scleral icterus.     Extraocular Movements: Extraocular  "movements intact.      Conjunctiva/sclera: Conjunctivae normal.      Pupils: Pupils are equal, round, and reactive to light.   Neck:      Thyroid: No thyroid mass or thyromegaly.      Vascular: No carotid bruit.   Cardiovascular:      Rate and Rhythm: Normal rate and regular rhythm. No extrasystoles are present.     Chest Wall: PMI is not displaced.      Heart sounds: Normal heart sounds. No murmur heard.     No gallop.   Pulmonary:      Effort: Pulmonary effort is normal. No accessory muscle usage or respiratory distress.      Breath sounds: Normal breath sounds.   Abdominal:      General: Bowel sounds are normal. There is no abdominal bruit.      Palpations: Abdomen is soft.      Tenderness: There is no abdominal tenderness. There is no rebound.   Musculoskeletal:      Cervical back: Normal range of motion and neck supple.   Lymphadenopathy:      Head:      Right side of head: No submental or submandibular adenopathy.      Left side of head: No submental or submandibular adenopathy.      Cervical:      Right cervical: No superficial, deep or posterior cervical adenopathy.     Left cervical: No superficial, deep or posterior cervical adenopathy.      Upper Body:      Right upper body: No supraclavicular adenopathy.      Left upper body: No supraclavicular adenopathy.   Skin:     General: Skin is warm and dry.   Neurological:      Mental Status: She is alert and oriented to person, place, and time.       Blood pressure 134/88, pulse 87, temperature 97.6 °F (36.4 °C), temperature source Oral, resp. rate 16, height 5' 6" (1.676 m), weight 64.9 kg (143 lb 3 oz), last menstrual period 05/23/2012, SpO2 99 %.Body mass index is 23.11 kg/m².            A/P:  1) syncope.  Persistent.  If this starts to occur more frequently (more than once year), she will let me know.  Since this has been present since childhood, I do not recommend further testing at this time    2) recent bout of constipation followed by explosive diarrhea " with some hematochezia.  Resolved.  Her recent CBC was unremarkable.  She has had a recent normal colonoscopy.  If this recurs, she will let me know.    3) hypertension.  Well controlled.  Continue with lisinopril   4) diabetes.  Well controlled.  Continue with Janumet.  We will check an A1c again in 6 months.  We will get new diabetic testing supplies ordered for her including a new meter    5)  Hyperlipidemia.  Fairly well controlled.  Continue with Lipitor.  We will check a fasting lipid profile in 1 year   6) sciatica.  Resolved.  If this recurs again, she will let me know  7) health maintenance issues.  We will schedule her mammogram.  She will receive her flu shot.  I did refill her Vagifem    We will see her back in 6 months or sooner if needed.

## 2023-09-28 DIAGNOSIS — I10 ESSENTIAL (PRIMARY) HYPERTENSION: ICD-10-CM

## 2023-09-28 DIAGNOSIS — E11.9 TYPE 2 DIABETES MELLITUS WITHOUT COMPLICATIONS: ICD-10-CM

## 2023-09-29 RX ORDER — LISINOPRIL 5 MG/1
TABLET ORAL
Qty: 90 TABLET | Refills: 3 | Status: SHIPPED | OUTPATIENT
Start: 2023-09-29

## 2023-09-29 NOTE — TELEPHONE ENCOUNTER
No care due was identified.  Health [facility] Embedded Care Due Messages. Reference number: [id number].   9/28/2023 9:06:09 PM CDT

## 2023-09-29 NOTE — TELEPHONE ENCOUNTER
Refill Decision Note   Katherine Rivers  is requesting a refill authorization.  Brief Assessment and Rationale for Refill:  Approve     Medication Therapy Plan:         Comments:     Note composed:11:22 AM 09/29/2023

## 2023-10-11 RX ORDER — ATORVASTATIN CALCIUM 20 MG/1
20 TABLET, FILM COATED ORAL
Qty: 90 TABLET | Refills: 3 | Status: SHIPPED | OUTPATIENT
Start: 2023-10-11

## 2023-10-12 NOTE — TELEPHONE ENCOUNTER
No care due was identified.  Health [facility] Embedded Care Due Messages. Reference number: [id number].   10/11/2023 10:38:17 PM CDT

## 2023-10-12 NOTE — TELEPHONE ENCOUNTER
Refill Decision Note   Katherine Rivers  is requesting a refill authorization.  Brief Assessment and Rationale for Refill:  Approve     Medication Therapy Plan:         Comments:     Note composed:11:56 PM 10/11/2023

## 2023-10-30 ENCOUNTER — HOSPITAL ENCOUNTER (OUTPATIENT)
Dept: RADIOLOGY | Facility: HOSPITAL | Age: 73
Discharge: HOME | End: 2023-10-30
Attending: FAMILY MEDICINE
Payer: MEDICARE

## 2023-10-30 DIAGNOSIS — Z12.39 ENCOUNTER FOR SCREENING FOR MALIGNANT NEOPLASM OF BREAST, UNSPECIFIED SCREENING MODALITY: ICD-10-CM

## 2023-10-30 DIAGNOSIS — Z12.31 ENCOUNTER FOR SCREENING MAMMOGRAM FOR MALIGNANT NEOPLASM OF BREAST: ICD-10-CM

## 2023-10-30 PROCEDURE — 77063 MAMMO DIGITAL SCREENING BILAT WITH TOMO: ICD-10-PCS | Mod: 26,GA,, | Performed by: RADIOLOGY

## 2023-10-30 PROCEDURE — 77063 BREAST TOMOSYNTHESIS BI: CPT | Mod: 26,GA,, | Performed by: RADIOLOGY

## 2023-10-30 PROCEDURE — 77067 MAMMO DIGITAL SCREENING BILAT WITH TOMO: ICD-10-PCS | Mod: 26,GA,, | Performed by: RADIOLOGY

## 2023-10-30 PROCEDURE — 77067 SCR MAMMO BI INCL CAD: CPT | Mod: 26,GA,, | Performed by: RADIOLOGY

## 2023-10-30 PROCEDURE — 77067 SCR MAMMO BI INCL CAD: CPT | Mod: GA,TC,PO

## 2023-10-31 ENCOUNTER — PATIENT MESSAGE (OUTPATIENT)
Dept: FAMILY MEDICINE | Facility: CLINIC | Age: 73
End: 2023-10-31
Payer: MEDICARE

## 2023-11-07 ENCOUNTER — PATIENT MESSAGE (OUTPATIENT)
Dept: FAMILY MEDICINE | Facility: CLINIC | Age: 73
End: 2023-11-07
Payer: MEDICARE

## 2023-11-08 RX ORDER — OMEPRAZOLE 10 MG/1
10 CAPSULE, DELAYED RELEASE ORAL DAILY
Qty: 90 CAPSULE | Refills: 3 | Status: SHIPPED | OUTPATIENT
Start: 2023-11-08 | End: 2023-11-14 | Stop reason: SDUPTHER

## 2023-11-08 NOTE — TELEPHONE ENCOUNTER
Refill Routing Note   Medication(s) are not appropriate for processing by [facility] for the following reason(s):      No active prescription written by provider    [facility] action(s):  Defer Care Due:  None identified            Appointments  past 12m or future 3m with PCP    Date Provider   Last Visit   9/21/2023 Jennifer Casillas MD   Next Visit   3/28/2024 Jennifer Casillas MD   ED visits in past 90 days: 0        Note composed:10:48 AM 11/08/2023

## 2023-11-08 NOTE — TELEPHONE ENCOUNTER
No care due was identified.  [facility] Embedded Care Due Messages. Reference number: [id number].   11/08/2023 9:55:18 AM CST

## 2023-11-14 RX ORDER — OMEPRAZOLE 10 MG/1
10 CAPSULE, DELAYED RELEASE ORAL DAILY
Qty: 90 CAPSULE | Refills: 3 | Status: SHIPPED | OUTPATIENT
Start: 2023-11-14

## 2023-11-14 NOTE — TELEPHONE ENCOUNTER
No care due was identified.  Health [facility] Embedded Care Due Messages. Reference number: [id number].   11/14/2023 1:31:00 PM CST

## 2023-12-06 ENCOUNTER — PATIENT MESSAGE (OUTPATIENT)
Dept: FAMILY MEDICINE | Facility: CLINIC | Age: 73
End: 2023-12-06
Payer: MEDICARE

## 2023-12-06 NOTE — TELEPHONE ENCOUNTER
Care Due:                  Date            Visit Type   Department     Provider  --------------------------------------------------------------------------------                                EP -                              PRIMARY      [facility]    Jennifer Beckwithwilian  Last Visit: 09-      CARE ([facility])   MEDICINE       Anger                              EP -                              PRIMARY      [facility]    Jennifer Gonzalez Sonja  Next Visit: 03-      CARE ([facility])   MEDICINE       Anger                                                            Last  Test          Frequency    Reason                     Performed    Due Date  --------------------------------------------------------------------------------    HBA1C.......  6 months...  SITagliptan-metformin....  08- 02-    Health [facility] Embedded Care Due Messages. Reference number: [id number].   12/06/2023 2:06:36 PM CST

## 2023-12-08 RX ORDER — AMITRIPTYLINE HYDROCHLORIDE 10 MG/1
10 TABLET, FILM COATED ORAL DAILY
Qty: 30 TABLET | Refills: 5 | Status: SHIPPED | OUTPATIENT
Start: 2023-12-08 | End: 2024-03-28 | Stop reason: SDUPTHER

## 2024-02-01 ENCOUNTER — TELEPHONE (OUTPATIENT)
Dept: UROGYNECOLOGY | Facility: CLINIC | Age: 74
End: 2024-02-01
Payer: MEDICARE

## 2024-02-01 NOTE — TELEPHONE ENCOUNTER
----- Message from Odalys Salazar sent at 2/1/2024  1:00 PM CST -----  Regarding: appointment  Contact: patient  Type:  Sooner Appointment Request    Caller is requesting a sooner appointment.  Caller declined first available appointment listed below.  Caller will not accept being placed on the waitlist and is requesting a message be sent to doctor.    Name of Caller:  patient  When is the first available appointment?    Symptoms:  recurrent UTI  Would the patient rather a call back or a response via [facility]? call  Best Call Back Number:  [phone] (home)   Additional Information:  Please call patient to schedule.  Thanks!

## 2024-03-11 ENCOUNTER — OFFICE VISIT (OUTPATIENT)
Dept: UROGYNECOLOGY | Facility: CLINIC | Age: 74
End: 2024-03-11
Payer: MEDICARE

## 2024-03-11 DIAGNOSIS — R35.0 URINARY FREQUENCY: Primary | ICD-10-CM

## 2024-03-11 DIAGNOSIS — N39.0 RECURRENT UTI: ICD-10-CM

## 2024-03-11 DIAGNOSIS — N39.8 VOIDING DYSFUNCTION: ICD-10-CM

## 2024-03-11 LAB
BILIRUBIN, UA POC OHS: NEGATIVE
BLOOD, UA POC OHS: NEGATIVE
CLARITY, UA POC OHS: CLEAR
COLOR, UA POC OHS: YELLOW
GLUCOSE, UA POC OHS: NEGATIVE
KETONES, UA POC OHS: NEGATIVE
LEUKOCYTES, UA POC OHS: ABNORMAL
NITRITE, UA POC OHS: NEGATIVE
PH, UA POC OHS: 6
PROTEIN, UA POC OHS: NEGATIVE
SPECIFIC GRAVITY, UA POC OHS: 1.01
UROBILINOGEN, UA POC OHS: 0.2

## 2024-03-11 PROCEDURE — 81003 URINALYSIS AUTO W/O SCOPE: CPT | Mod: QW,S$GLB,, | Performed by: OBSTETRICS & GYNECOLOGY

## 2024-03-11 PROCEDURE — 99999 PR PBB SHADOW E&M-EST. PATIENT-LVL III: CPT | Mod: PBBFAC,,, | Performed by: OBSTETRICS & GYNECOLOGY

## 2024-03-11 PROCEDURE — 99213 OFFICE O/P EST LOW 20 MIN: CPT | Mod: S$GLB,,, | Performed by: OBSTETRICS & GYNECOLOGY

## 2024-03-11 RX ORDER — TRIMETHOPRIM 100 MG/1
100 TABLET ORAL NIGHTLY
Qty: 42 TABLET | Refills: 2 | Status: SHIPPED | OUTPATIENT
Start: 2024-03-11

## 2024-03-11 NOTE — PROGRESS NOTES
Subjective:     Chief Complaint:  Recurrent UTIs  History of Present Illness: Katherine Rivers is a 73 y.o. female who presents for recurrent UTIs.  In 2021 she was prescribed prophylaxis along with Flomax to help her void more completely.  She did well for the year.  She then went on postcoital prophylaxis did well for while.  When she had cataract surgery she stopped the Flomax.  She has now had 4 UTIs in the last several months.    Review of Systems    Constitutional:  Osteopenia  Eyes: No vision changes.  ENT:  Hearing loss  Respiratory: No SOB.  Cardiovascular: Hypertension hyperlipidemia  Gastrointestinal: No constipation.  GERD  Genitourinary: No vaginal bleeding or discharge.  Integument/Breast: Negative  Hematologic/Lymphatic: No history of anemia.  Musculoskeletal:  Decreased range of motion  Neurological: No known disc problems. No paresthesias.  Behavioral/Psych: No history of depression.   Endocrine:  Diabetes  Allergy/Immune: no recent reactions     Objective:   General Exam:  General appearance: WDNF. NAD.   HEENT: [place]. EOM's intact.  Neck: Normal thyroid.   Back: No CVA tenderness.  RESP: No SOB.  Breasts: deferred  Abdomen: Benign without localizing signs.  Extremities: No edema. No varices.  Lymphatic: noncontributory  Skin: No rashes. No lesions.  Neurologic: Intact.   Psych: Oriented.       Assessment:   As started having recurrent UTIs again since she went off of the Flomax.  We discussed how repetitive UTIs make her more prone to get even more infections.       Plan:    Will go on prophylaxis for 6 weeks with trimethoprim.  If she does well we will observe.  If not she will discuss with her ophthalmologist restarting an alpha-blocker

## 2024-03-21 ENCOUNTER — LAB VISIT (OUTPATIENT)
Dept: LAB | Facility: HOSPITAL | Age: 74
End: 2024-03-21
Attending: FAMILY MEDICINE
Payer: MEDICARE

## 2024-03-21 DIAGNOSIS — E11.8 DIABETES MELLITUS TYPE 2 WITH COMPLICATIONS: ICD-10-CM

## 2024-03-21 LAB
ESTIMATED AVG GLUCOSE: 148 MG/DL (ref 68–131)
HBA1C MFR BLD: 6.8 % (ref 4–5.6)

## 2024-03-21 PROCEDURE — 83036 HEMOGLOBIN GLYCOSYLATED A1C: CPT | Performed by: FAMILY MEDICINE

## 2024-03-21 PROCEDURE — 36415 COLL VENOUS BLD VENIPUNCTURE: CPT | Mod: PO | Performed by: FAMILY MEDICINE

## 2024-03-22 ENCOUNTER — PATIENT MESSAGE (OUTPATIENT)
Dept: ADMINISTRATIVE | Facility: HOSPITAL | Age: 74
End: 2024-03-22
Payer: MEDICARE

## 2024-03-22 ENCOUNTER — PATIENT MESSAGE (OUTPATIENT)
Dept: FAMILY MEDICINE | Facility: CLINIC | Age: 74
End: 2024-03-22
Payer: MEDICARE

## 2024-03-28 ENCOUNTER — PATIENT OUTREACH (OUTPATIENT)
Dept: ADMINISTRATIVE | Facility: HOSPITAL | Age: 74
End: 2024-03-28
Payer: MEDICARE

## 2024-03-28 ENCOUNTER — OFFICE VISIT (OUTPATIENT)
Dept: FAMILY MEDICINE | Facility: CLINIC | Age: 74
End: 2024-03-28
Payer: MEDICARE

## 2024-03-28 VITALS
TEMPERATURE: 98 F | OXYGEN SATURATION: 100 % | DIASTOLIC BLOOD PRESSURE: 84 MMHG | SYSTOLIC BLOOD PRESSURE: 122 MMHG | RESPIRATION RATE: 16 BRPM | BODY MASS INDEX: 22.75 KG/M2 | WEIGHT: 141.56 LBS | HEART RATE: 90 BPM | HEIGHT: 66 IN

## 2024-03-28 DIAGNOSIS — E11.59 HYPERTENSION ASSOCIATED WITH DIABETES: ICD-10-CM

## 2024-03-28 DIAGNOSIS — E11.69 HYPERLIPIDEMIA ASSOCIATED WITH TYPE 2 DIABETES MELLITUS: ICD-10-CM

## 2024-03-28 DIAGNOSIS — I15.2 HYPERTENSION ASSOCIATED WITH DIABETES: ICD-10-CM

## 2024-03-28 DIAGNOSIS — E78.5 HYPERLIPIDEMIA ASSOCIATED WITH TYPE 2 DIABETES MELLITUS: ICD-10-CM

## 2024-03-28 DIAGNOSIS — E11.8 DIABETES MELLITUS TYPE 2 WITH COMPLICATIONS: Primary | ICD-10-CM

## 2024-03-28 PROCEDURE — 99214 OFFICE O/P EST MOD 30 MIN: CPT | Mod: S$GLB,,, | Performed by: FAMILY MEDICINE

## 2024-03-28 RX ORDER — AMITRIPTYLINE HYDROCHLORIDE 10 MG/1
10 TABLET, FILM COATED ORAL DAILY
Qty: 90 TABLET | Refills: 3 | Status: SHIPPED | OUTPATIENT
Start: 2024-03-28

## 2024-03-28 RX ORDER — SITAGLIPTIN AND METFORMIN HYDROCHLORIDE 1000; 50 MG/1; MG/1
1 TABLET, FILM COATED ORAL DAILY
Qty: 90 TABLET | Refills: 1 | Status: SHIPPED | OUTPATIENT
Start: 2024-03-28 | End: 2024-06-08 | Stop reason: SDUPTHER

## 2024-03-28 RX ORDER — ESTRADIOL 10 UG/1
1 INSERT VAGINAL
Qty: 24 TABLET | Refills: 3 | Status: SHIPPED | OUTPATIENT
Start: 2024-03-28 | End: 2024-05-18

## 2024-03-28 NOTE — PROGRESS NOTES
Population Health Chart Review & Patient Outreach Details      Additional Pop Health Notes:    CAMPAIGN- Preventative Care Screening           Updates Requested / Reviewed:      Care Team Updated         Health Maintenance Topics Overdue:      VB Score: 1     Foot Exam    RSV Vaccine                  Health Maintenance Topic(s) Outreach Outcomes & Actions Taken:    Eye Exam - Outreach Outcomes & Actions Taken  : External Records Requested & Care Team Updated if Applicable

## 2024-03-28 NOTE — PROGRESS NOTES
Subjective:       Patient ID: Katherine Rivers is a 73 y.o. female.    Chief Complaint: Follow-up    HPI  The patient is a 73-year-old who is here today for follow-up.  Overall, she is doing well.  She recently had labs which reviewed today.  She does need some refills on her medications and requests that these refills be for 90 days.    Today we discussed the followin) recurrent UTIs.  She is having issues with recurrent UTIs again.  She did see the urologist.  She is currently on a suppressive antibiotic daily for the next 3 months and may remain on this indefinitely depending on how she does over the next 3 months.  She does need a refill of her Vagifem which she is using regularly  2) diabetes.  Her recent A1c was 6.8.  At home, she noticed that her sugars are trending up into the 150s.  She recently resume Janumet  mg half a tablet before breakfast lunch and dinner and that has improved her sugar readings at home.  She will be having her diabetic eye exam later this year.  3) hypertension.  Today her blood pressure is 122/84.  She is taking lisinopril which she is tolerating well  4) IBS.  She is doing really well with the Elavil.  Previously she had been on up to 5 stomach medicines but now she only needs the Elavil.  She is taking a small dose of this and finds that this is really been impactful for             Review of Systems   Constitutional:  Negative for appetite change, chills, diaphoresis, fatigue, fever and unexpected weight change.   HENT:  Positive for hearing loss. Negative for congestion, ear pain, postnasal drip, rhinorrhea, sinus pressure, sneezing, sore throat and trouble swallowing.    Eyes:  Negative for pain, discharge and visual disturbance.   Respiratory:  Negative for cough, chest tightness, shortness of breath and wheezing.    Cardiovascular:  Negative for chest pain, palpitations and leg swelling.   Gastrointestinal:  Negative for abdominal distention, abdominal pain, blood  in stool, constipation, diarrhea, nausea and vomiting.   Skin:  Negative for rash.         Objective:      Physical Exam  Constitutional:       General: She is not in acute distress.     Appearance: Normal appearance. She is well-developed.   HENT:      Head: Normocephalic and atraumatic.      Right Ear: Tympanic membrane, ear canal and external ear normal. Decreased hearing noted.      Left Ear: Tympanic membrane, ear canal and external ear normal. Decreased hearing noted.      Nose: Nose normal.      Mouth/Throat:      Mouth: No oral lesions.      Pharynx: No oropharyngeal exudate or posterior oropharyngeal erythema.   Eyes:      General: Lids are normal. No scleral icterus.     Extraocular Movements: Extraocular movements intact.      Conjunctiva/sclera: Conjunctivae normal.      Pupils: Pupils are equal, round, and reactive to light.   Neck:      Thyroid: No thyroid mass or thyromegaly.      Vascular: No carotid bruit.   Cardiovascular:      Rate and Rhythm: Normal rate and regular rhythm. No extrasystoles are present.     Chest Wall: PMI is not displaced.      Pulses:           Dorsalis pedis pulses are 2+ on the right side and 2+ on the left side.        Posterior tibial pulses are 2+ on the right side and 2+ on the left side.      Heart sounds: Normal heart sounds. No murmur heard.     No gallop.   Pulmonary:      Effort: Pulmonary effort is normal. No accessory muscle usage or respiratory distress.      Breath sounds: Normal breath sounds.   Abdominal:      General: Bowel sounds are normal. There is no abdominal bruit.      Palpations: Abdomen is soft.      Tenderness: There is no abdominal tenderness. There is no rebound.   Musculoskeletal:      Cervical back: Normal range of motion and neck supple.      Right foot: No deformity.      Left foot: No deformity.   Feet:      Right foot:      Protective Sensation: 10 sites tested.  10 sites sensed.      Skin integrity: Warmth present. No ulcer or callus.       "Left foot:      Protective Sensation: 10 sites tested.  10 sites sensed.      Skin integrity: Warmth present. No ulcer or callus.   Lymphadenopathy:      Head:      Right side of head: No submental or submandibular adenopathy.      Left side of head: No submental or submandibular adenopathy.      Cervical:      Right cervical: No superficial, deep or posterior cervical adenopathy.     Left cervical: No superficial, deep or posterior cervical adenopathy.      Upper Body:      Right upper body: No supraclavicular adenopathy.      Left upper body: No supraclavicular adenopathy.   Skin:     General: Skin is warm and dry.   Neurological:      Mental Status: She is alert and oriented to person, place, and time.       Blood pressure 122/84, pulse 90, temperature 97.6 °F (36.4 °C), temperature source Oral, resp. rate 16, height 5' 6" (1.676 m), weight 64.2 kg (141 lb 8.6 oz), last menstrual period 05/23/2012, SpO2 100 %.Body mass index is 22.84 kg/m².              A/P:  1)  recurrent UTIs.  Follow up with Urology and continue with medication under their direction.  I did refill her Vagifem    2) diabetes.  Well controlled.  We are going to change her Janumet 50/1000 to once a day with breakfast.  If her fasting sugars are consistently higher than 140, she will let me know.  She will keep her upcoming eye exam.  We will check an A1c again in 6 months  3) hypertension.  Well controlled.  Continue with lisinopril  4) IBS.  Well controlled.  Continue with Elavil  5)  Hyperlipidemia.  Previously well controlled with Lipitor.  We will check a fasting lipid profile with her upcoming labs    As long as she does well, we will check labs in 6 months and see her back at that time  "

## 2024-03-28 NOTE — LETTER
AUTHORIZATION FOR RELEASE OF   CONFIDENTIAL INFORMATION    Ranjith Toth MD    We are seeing Katherine Rivers, date of birth 1950, in the clinic at [facility]. Jennifer Casillas MD is the patient's PCP. Katherine Rivers has an outstanding lab/procedure at the time we reviewed her chart. In order to help keep her health information updated, she has authorized us to request the following medical record(s):          EYE EXAM                  Please fax records to [facility], [place], [place], [place], [phone]     If you have any questions, please contact Sara Mckeon, Care Coordinator   at [phone].              Patient Name: Katherine Rivers  : 1950  Patient Phone #: [phone]

## 2024-04-02 ENCOUNTER — PATIENT OUTREACH (OUTPATIENT)
Dept: ADMINISTRATIVE | Facility: HOSPITAL | Age: 74
End: 2024-04-02
Payer: MEDICARE

## 2024-04-02 NOTE — PROGRESS NOTES
Population Health Chart Review & Patient Outreach Details      Additional Pop Health Notes:               Updates Requested / Reviewed:      Updated Care Coordination Note         Health Maintenance Topics Overdue:      VB Score: 0     Patient is not due for any topics at this time.    RSV Vaccine                  Health Maintenance Topic(s) Outreach Outcomes & Actions Taken:    Eye Exam - Outreach Outcomes & Actions Taken  : Diabetic Eye External Records Uploaded, Care Team & History Updated if Applicable

## 2024-05-17 NOTE — TELEPHONE ENCOUNTER
No care due was identified.  Health [facility] Embedded Care Due Messages. Reference number: [id number].   5/17/2024 12:03:48 AM CDT

## 2024-05-17 NOTE — TELEPHONE ENCOUNTER
Refill Routing Note   Medication(s) are not appropriate for processing by [facility] for the following reason(s):        Outside of protocol    [facility] action(s):  Route             Appointments  past 12m or future 3m with PCP    Date Provider   Last Visit   3/28/2024 Jennifer Casillas MD   Next Visit   9/26/2024 Jennifer Casillas MD   ED visits in past 90 days: 0        Note composed:11:52 AM 05/17/2024

## 2024-05-18 RX ORDER — ESTRADIOL 10 UG/1
TABLET VAGINAL
Qty: 8 TABLET | Refills: 4 | Status: SHIPPED | OUTPATIENT
Start: 2024-05-18

## 2024-06-06 ENCOUNTER — PATIENT MESSAGE (OUTPATIENT)
Dept: FAMILY MEDICINE | Facility: CLINIC | Age: 74
End: 2024-06-06
Payer: MEDICARE

## 2024-06-06 ENCOUNTER — PATIENT MESSAGE (OUTPATIENT)
Dept: UROGYNECOLOGY | Facility: CLINIC | Age: 74
End: 2024-06-06
Payer: MEDICARE

## 2024-06-08 RX ORDER — SITAGLIPTIN AND METFORMIN HYDROCHLORIDE 1000; 50 MG/1; MG/1
1 TABLET, FILM COATED ORAL 2 TIMES DAILY WITH MEALS
Qty: 180 TABLET | Refills: 1 | Status: SHIPPED | OUTPATIENT
Start: 2024-06-08

## 2024-06-11 RX ORDER — NITROFURANTOIN MACROCRYSTALS 50 MG/1
100 CAPSULE ORAL 2 TIMES DAILY
Qty: 10 CAPSULE | Refills: 1 | Status: SHIPPED | OUTPATIENT
Start: 2024-06-11

## 2024-06-11 RX ORDER — TRIMETHOPRIM 100 MG/1
100 TABLET ORAL NIGHTLY
Qty: 42 TABLET | Refills: 6 | Status: SHIPPED | OUTPATIENT
Start: 2024-06-11

## 2024-07-30 DIAGNOSIS — I10 ESSENTIAL (PRIMARY) HYPERTENSION: ICD-10-CM

## 2024-07-30 DIAGNOSIS — Z00.00 ENCOUNTER FOR MEDICARE ANNUAL WELLNESS EXAM: ICD-10-CM

## 2024-07-30 DIAGNOSIS — E11.9 TYPE 2 DIABETES MELLITUS WITHOUT COMPLICATIONS: ICD-10-CM

## 2024-07-30 RX ORDER — LISINOPRIL 5 MG/1
TABLET ORAL
Qty: 90 TABLET | Refills: 0 | Status: SHIPPED | OUTPATIENT
Start: 2024-07-30

## 2024-07-30 NOTE — TELEPHONE ENCOUNTER
Care Due:                  Date            Visit Type   Department     Provider  --------------------------------------------------------------------------------                                EP -                              PRIMARY      [facility] Jennifer Casillas  Last Visit: 03-      CARE ([facility])   MEDICINE       Anger                              EP -                              PRIMARY      [facility]    Jennifer Casillas  Next Visit: 09-      CARE ([facility])   MEDICINE       Anger                                                            Last  Test          Frequency    Reason                     Performed    Due Date  --------------------------------------------------------------------------------    CMP.........  12 months..  SITagliptan-metformin,     08- 08-                             atorvastatin, lisinopriL.    HBA1C.......  6 months...  SITagliptan-metformin....  03- 09-    Lipid Panel.  12 months..  atorvastatin.............  08- 08-    [facility] Embedded Care Due Messages. Reference number: [id number].   7/30/2024 4:25:38 AM CDT

## 2024-07-30 NOTE — TELEPHONE ENCOUNTER
Refill Decision Note   Katherine Rivers  is requesting a refill authorization.  Brief Assessment and Rationale for Refill:  Approve     Medication Therapy Plan: FLOS      Comments:     Note composed:12:53 PM 07/30/2024

## 2024-08-21 LAB
LEFT EYE DM RETINOPATHY: NEGATIVE
RIGHT EYE DM RETINOPATHY: NEGATIVE

## 2024-08-22 NOTE — TELEPHONE ENCOUNTER
Refill Routing Note   Medication(s) are not appropriate for processing by [facility] for the following reason(s):        Required labs outdated    [facility] action(s):  Defer               Appointments  past 12m or future 3m with PCP    Date Provider   Last Visit   3/28/2024 Jennifer Casillas MD   Next Visit   9/26/2024 Jennifer Casillas MD   ED visits in past 90 days: 0        Note composed:9:17 AM 08/22/2024

## 2024-08-22 NOTE — TELEPHONE ENCOUNTER
No care due was identified.  [facility] Embedded Care Due Messages. Reference number: [id number].   8/21/2024 9:33:28 PM CDT

## 2024-08-23 RX ORDER — ATORVASTATIN CALCIUM 20 MG/1
20 TABLET, FILM COATED ORAL
Qty: 90 TABLET | Refills: 0 | Status: SHIPPED | OUTPATIENT
Start: 2024-08-23

## 2024-08-30 ENCOUNTER — PATIENT OUTREACH (OUTPATIENT)
Dept: ADMINISTRATIVE | Facility: HOSPITAL | Age: 74
End: 2024-08-30
Payer: MEDICARE

## 2024-08-30 NOTE — PROGRESS NOTES
Population Health Chart Review & Patient Outreach Details      Additional Pop Health Notes:               Updates Requested / Reviewed:      Updated Care Coordination Note and          Health Maintenance Topics Overdue:      [facility] Score: 1     Eye Exam    RSV Vaccine                  Health Maintenance Topic(s) Outreach Outcomes & Actions Taken:    Eye Exam - Outreach Outcomes & Actions Taken  : Diabetic Eye External Records Uploaded, Care Team & History Updated if Applicable

## 2024-09-10 ENCOUNTER — TELEPHONE (OUTPATIENT)
Dept: FAMILY MEDICINE | Facility: CLINIC | Age: 74
End: 2024-09-10
Payer: MEDICARE

## 2024-09-17 RX ORDER — OMEPRAZOLE 10 MG/1
10 CAPSULE, DELAYED RELEASE ORAL
Qty: 90 CAPSULE | Refills: 1 | Status: SHIPPED | OUTPATIENT
Start: 2024-09-17

## 2024-09-17 NOTE — TELEPHONE ENCOUNTER
Refill Decision Note   Katherine Rivers  is requesting a refill authorization.  Brief Assessment and Rationale for Refill:  Approve     Medication Therapy Plan:         Comments:     Note composed:5:49 PM 09/17/2024

## 2024-09-17 NOTE — TELEPHONE ENCOUNTER
No care due was identified.  [facility] Embedded Care Due Messages. Reference number: [id number].   9/16/2024 9:11:59 PM CDT

## 2024-09-18 NOTE — TELEPHONE ENCOUNTER
No care due was identified.  Health [facility] Embedded Care Due Messages. Reference number: [id number].   9/18/2024 12:04:02 PM CDT

## 2024-09-19 ENCOUNTER — LAB VISIT (OUTPATIENT)
Dept: LAB | Facility: HOSPITAL | Age: 74
End: 2024-09-19
Attending: FAMILY MEDICINE
Payer: MEDICARE

## 2024-09-19 DIAGNOSIS — I15.2 HYPERTENSION ASSOCIATED WITH DIABETES: ICD-10-CM

## 2024-09-19 DIAGNOSIS — E11.69 HYPERLIPIDEMIA ASSOCIATED WITH TYPE 2 DIABETES MELLITUS: ICD-10-CM

## 2024-09-19 DIAGNOSIS — E78.5 HYPERLIPIDEMIA ASSOCIATED WITH TYPE 2 DIABETES MELLITUS: ICD-10-CM

## 2024-09-19 DIAGNOSIS — E11.59 HYPERTENSION ASSOCIATED WITH DIABETES: ICD-10-CM

## 2024-09-19 DIAGNOSIS — E11.8 DIABETES MELLITUS TYPE 2 WITH COMPLICATIONS: ICD-10-CM

## 2024-09-19 LAB
ALBUMIN SERPL BCP-MCNC: 3.9 G/DL (ref 3.5–5.2)
ALP SERPL-CCNC: 59 U/L (ref 55–135)
ALT SERPL W/O P-5'-P-CCNC: 33 U/L (ref 10–44)
ANION GAP SERPL CALC-SCNC: 9 MMOL/L (ref 8–16)
AST SERPL-CCNC: 31 U/L (ref 10–40)
BASOPHILS # BLD AUTO: 0.1 K/UL (ref 0–0.2)
BASOPHILS NFR BLD: 1.2 % (ref 0–1.9)
BILIRUB SERPL-MCNC: 0.3 MG/DL (ref 0.1–1)
BUN SERPL-MCNC: 9 MG/DL (ref 8–23)
CALCIUM SERPL-MCNC: 9.7 MG/DL (ref 8.7–10.5)
CHLORIDE SERPL-SCNC: 102 MMOL/L (ref 95–110)
CHOLEST SERPL-MCNC: 139 MG/DL (ref 120–199)
CHOLEST/HDLC SERPL: 3 {RATIO} (ref 2–5)
CO2 SERPL-SCNC: 25 MMOL/L (ref 23–29)
CREAT SERPL-MCNC: 0.9 MG/DL (ref 0.5–1.4)
DIFFERENTIAL METHOD BLD: NORMAL
EOSINOPHIL # BLD AUTO: 0.3 K/UL (ref 0–0.5)
EOSINOPHIL NFR BLD: 3.4 % (ref 0–8)
ERYTHROCYTE [DISTWIDTH] IN BLOOD BY AUTOMATED COUNT: 13.9 % (ref 11.5–14.5)
EST. GFR  (NO RACE VARIABLE): >60 ML/MIN/1.73 M^2
ESTIMATED AVG GLUCOSE: 160 MG/DL (ref 68–131)
GLUCOSE SERPL-MCNC: 147 MG/DL (ref 70–110)
HBA1C MFR BLD: 7.2 % (ref 4–5.6)
HCT VFR BLD AUTO: 38.9 % (ref 37–48.5)
HDLC SERPL-MCNC: 47 MG/DL (ref 40–75)
HDLC SERPL: 33.8 % (ref 20–50)
HGB BLD-MCNC: 12.5 G/DL (ref 12–16)
IMM GRANULOCYTES # BLD AUTO: 0.04 K/UL (ref 0–0.04)
IMM GRANULOCYTES NFR BLD AUTO: 0.5 % (ref 0–0.5)
LDLC SERPL CALC-MCNC: 66.8 MG/DL (ref 63–159)
LYMPHOCYTES # BLD AUTO: 3.8 K/UL (ref 1–4.8)
LYMPHOCYTES NFR BLD: 43.6 % (ref 18–48)
MCH RBC QN AUTO: 27.5 PG (ref 27–31)
MCHC RBC AUTO-ENTMCNC: 32.1 G/DL (ref 32–36)
MCV RBC AUTO: 86 FL (ref 82–98)
MONOCYTES # BLD AUTO: 0.7 K/UL (ref 0.3–1)
MONOCYTES NFR BLD: 7.9 % (ref 4–15)
NEUTROPHILS # BLD AUTO: 3.7 K/UL (ref 1.8–7.7)
NEUTROPHILS NFR BLD: 43.4 % (ref 38–73)
NONHDLC SERPL-MCNC: 92 MG/DL
NRBC BLD-RTO: 0 /100 WBC
PLATELET # BLD AUTO: 299 K/UL (ref 150–450)
PMV BLD AUTO: 10.7 FL (ref 9.2–12.9)
POTASSIUM SERPL-SCNC: 4.4 MMOL/L (ref 3.5–5.1)
PROT SERPL-MCNC: 6.4 G/DL (ref 6–8.4)
RBC # BLD AUTO: 4.55 M/UL (ref 4–5.4)
SODIUM SERPL-SCNC: 136 MMOL/L (ref 136–145)
TRIGL SERPL-MCNC: 126 MG/DL (ref 30–150)
TSH SERPL DL<=0.005 MIU/L-ACNC: 1.63 UIU/ML (ref 0.4–4)
WBC # BLD AUTO: 8.6 K/UL (ref 3.9–12.7)

## 2024-09-19 PROCEDURE — 36415 COLL VENOUS BLD VENIPUNCTURE: CPT | Mod: PO | Performed by: FAMILY MEDICINE

## 2024-09-19 PROCEDURE — 80053 COMPREHEN METABOLIC PANEL: CPT | Performed by: FAMILY MEDICINE

## 2024-09-19 PROCEDURE — 85025 COMPLETE CBC W/AUTO DIFF WBC: CPT | Performed by: FAMILY MEDICINE

## 2024-09-19 PROCEDURE — 83036 HEMOGLOBIN GLYCOSYLATED A1C: CPT | Performed by: FAMILY MEDICINE

## 2024-09-19 PROCEDURE — 84443 ASSAY THYROID STIM HORMONE: CPT | Performed by: FAMILY MEDICINE

## 2024-09-19 PROCEDURE — 80061 LIPID PANEL: CPT | Performed by: FAMILY MEDICINE

## 2024-09-19 RX ORDER — LANCETS
EACH MISCELLANEOUS
Qty: 100 EACH | Refills: 3 | Status: SHIPPED | OUTPATIENT
Start: 2024-09-19

## 2024-09-19 NOTE — TELEPHONE ENCOUNTER
Katherine Rivers  is requesting a refill authorization.  Brief Assessment and Rationale for Refill:  Approve     Medication Therapy Plan:         Comments:     Note composed:11:17 AM 09/19/2024

## 2024-09-20 ENCOUNTER — PATIENT MESSAGE (OUTPATIENT)
Dept: FAMILY MEDICINE | Facility: CLINIC | Age: 74
End: 2024-09-20
Payer: MEDICARE

## 2024-09-25 NOTE — TELEPHONE ENCOUNTER
No care due was identified.  Health [facility] Embedded Care Due Messages. Reference number: [id number].   9/25/2024 12:34:02 AM CDT

## 2024-09-26 ENCOUNTER — TELEPHONE (OUTPATIENT)
Dept: ENDOCRINOLOGY | Facility: CLINIC | Age: 74
End: 2024-09-26
Payer: MEDICARE

## 2024-09-26 RX ORDER — ESTRADIOL 10 UG/1
TABLET VAGINAL
Qty: 8 TABLET | Refills: 4 | Status: SHIPPED | OUTPATIENT
Start: 2024-09-26

## 2024-09-26 NOTE — TELEPHONE ENCOUNTER
----- Message from Odalys Salazar sent at 9/26/2024  2:26 PM CDT -----  Regarding: appointment  Contact: patient  Type:  Sooner Appointment Request    Caller is requesting a sooner appointment.  Caller declined first available appointment listed below.  Caller will not accept being placed on the waitlist and is requesting a message be sent to doctor.    Name of Caller:  patient  When is the first available appointment?    Symptoms:  diabetic  Would the patient rather a call back or a response via [facility]? call  Best Call Back Number:  [phone] (home) [phone] (work)    Additional Information:  Please call patient to schedule.  Thanks!

## 2024-09-26 NOTE — TELEPHONE ENCOUNTER
S/w pt, appt sched w/ SHAKIRA Frazier, in [facility] 11/27/24 to [facility]. She lives in [facility] so would like to be seen there.

## 2024-09-27 ENCOUNTER — OFFICE VISIT (OUTPATIENT)
Dept: FAMILY MEDICINE | Facility: CLINIC | Age: 74
End: 2024-09-27
Payer: MEDICARE

## 2024-09-27 VITALS
DIASTOLIC BLOOD PRESSURE: 84 MMHG | SYSTOLIC BLOOD PRESSURE: 136 MMHG | OXYGEN SATURATION: 99 % | HEART RATE: 94 BPM | RESPIRATION RATE: 16 BRPM | WEIGHT: 145.06 LBS | BODY MASS INDEX: 23.31 KG/M2 | TEMPERATURE: 98 F | HEIGHT: 66 IN

## 2024-09-27 DIAGNOSIS — E11.8 DIABETES MELLITUS TYPE 2 WITH COMPLICATIONS: Primary | ICD-10-CM

## 2024-09-27 DIAGNOSIS — E78.5 HYPERLIPIDEMIA ASSOCIATED WITH TYPE 2 DIABETES MELLITUS: ICD-10-CM

## 2024-09-27 DIAGNOSIS — E11.59 HYPERTENSION ASSOCIATED WITH DIABETES: ICD-10-CM

## 2024-09-27 DIAGNOSIS — I15.2 HYPERTENSION ASSOCIATED WITH DIABETES: ICD-10-CM

## 2024-09-27 DIAGNOSIS — Z23 IMMUNIZATION DUE: ICD-10-CM

## 2024-09-27 DIAGNOSIS — Z12.31 ENCOUNTER FOR SCREENING MAMMOGRAM FOR MALIGNANT NEOPLASM OF BREAST: ICD-10-CM

## 2024-09-27 DIAGNOSIS — E11.69 HYPERLIPIDEMIA ASSOCIATED WITH TYPE 2 DIABETES MELLITUS: ICD-10-CM

## 2024-09-27 DIAGNOSIS — Z12.39 ENCOUNTER FOR SCREENING FOR MALIGNANT NEOPLASM OF BREAST, UNSPECIFIED SCREENING MODALITY: ICD-10-CM

## 2024-09-27 PROCEDURE — G2211 COMPLEX E/M VISIT ADD ON: HCPCS | Mod: S$GLB,,, | Performed by: FAMILY MEDICINE

## 2024-09-27 PROCEDURE — 3061F NEG MICROALBUMINURIA REV: CPT | Mod: CPTII,S$GLB,, | Performed by: FAMILY MEDICINE

## 2024-09-27 PROCEDURE — 3066F NEPHROPATHY DOC TX: CPT | Mod: CPTII,S$GLB,, | Performed by: FAMILY MEDICINE

## 2024-09-27 PROCEDURE — G0008 ADMIN INFLUENZA VIRUS VAC: HCPCS | Mod: S$GLB,,, | Performed by: FAMILY MEDICINE

## 2024-09-27 PROCEDURE — 3051F HG A1C>EQUAL 7.0%<8.0%: CPT | Mod: CPTII,S$GLB,, | Performed by: FAMILY MEDICINE

## 2024-09-27 PROCEDURE — 1159F MED LIST DOCD IN RCRD: CPT | Mod: CPTII,S$GLB,, | Performed by: FAMILY MEDICINE

## 2024-09-27 PROCEDURE — 3079F DIAST BP 80-89 MM HG: CPT | Mod: CPTII,S$GLB,, | Performed by: FAMILY MEDICINE

## 2024-09-27 PROCEDURE — 3075F SYST BP GE 130 - 139MM HG: CPT | Mod: CPTII,S$GLB,, | Performed by: FAMILY MEDICINE

## 2024-09-27 PROCEDURE — 1126F AMNT PAIN NOTED NONE PRSNT: CPT | Mod: CPTII,S$GLB,, | Performed by: FAMILY MEDICINE

## 2024-09-27 PROCEDURE — 4010F ACE/ARB THERAPY RXD/TAKEN: CPT | Mod: CPTII,S$GLB,, | Performed by: FAMILY MEDICINE

## 2024-09-27 PROCEDURE — 3008F BODY MASS INDEX DOCD: CPT | Mod: CPTII,S$GLB,, | Performed by: FAMILY MEDICINE

## 2024-09-27 PROCEDURE — 3288F FALL RISK ASSESSMENT DOCD: CPT | Mod: CPTII,S$GLB,, | Performed by: FAMILY MEDICINE

## 2024-09-27 PROCEDURE — 1101F PT FALLS ASSESS-DOCD LE1/YR: CPT | Mod: CPTII,S$GLB,, | Performed by: FAMILY MEDICINE

## 2024-09-27 PROCEDURE — 1157F ADVNC CARE PLAN IN RCRD: CPT | Mod: CPTII,S$GLB,, | Performed by: FAMILY MEDICINE

## 2024-09-27 PROCEDURE — 99214 OFFICE O/P EST MOD 30 MIN: CPT | Mod: S$GLB,,, | Performed by: FAMILY MEDICINE

## 2024-09-27 PROCEDURE — 2023F DILAT RTA XM W/O RTNOPTHY: CPT | Mod: CPTII,S$GLB,, | Performed by: FAMILY MEDICINE

## 2024-09-27 PROCEDURE — 90653 IIV ADJUVANT VACCINE IM: CPT | Mod: S$GLB,,, | Performed by: FAMILY MEDICINE

## 2024-09-27 PROCEDURE — 1160F RVW MEDS BY RX/DR IN RCRD: CPT | Mod: CPTII,S$GLB,, | Performed by: FAMILY MEDICINE

## 2024-09-27 RX ORDER — SEMAGLUTIDE 0.68 MG/ML
INJECTION, SOLUTION SUBCUTANEOUS
Qty: 3 ML | Refills: 0 | Status: SHIPPED | OUTPATIENT
Start: 2024-09-27 | End: 2024-10-25

## 2024-09-27 RX ORDER — DICYCLOMINE HYDROCHLORIDE 10 MG/1
10 CAPSULE ORAL
COMMUNITY

## 2024-09-27 RX ORDER — METFORMIN HYDROCHLORIDE 1000 MG/1
1000 TABLET ORAL 2 TIMES DAILY WITH MEALS
Qty: 180 TABLET | Refills: 1 | Status: SHIPPED | OUTPATIENT
Start: 2024-09-27 | End: 2025-09-27

## 2024-09-28 NOTE — PROGRESS NOTES
Subjective:       Patient ID: Katherine Rivers is a 74 y.o. female.    Chief Complaint: Follow-up    HPI  The patient is a 74-year-old who is here today for follow-up.  Overall, she is doing well but is not happy with her recent A1c.  She would be willing to receive her flu shot today.  She would like to schedule her mammogram.    Today we discussed the followin) diabetes.  Her recent A1c was 7.2 up from prior value of 6.8 which was up from prior value of 6.7 which was up from prior value of 6.2.  She has not had a value higher than 6.4 since 2021.  Her sugars have recently been yo-yoing between 150 and 180.  She is currently taking Janumet which has become quite costly at 600 dollars per prescription.  She is interested in trying Ozempic to see if that would control her diabetes better and help curb her appetite for sweets.  2) hypertension.  Today her blood pressure is 136/84 She is taking her lisinopril.  At home her blood pressures are consistently good around 120 over 70.  She finds that her blood pressure is always high when she comes to the doctor's office  3) hyperlipidemia.  Her recent total cholesterol was 139 and LDL was 66.  She is taking Lipitor which she is tolerating well.    Review of Systems   Constitutional:  Negative for appetite change, chills, diaphoresis, fatigue, fever and unexpected weight change.   HENT:  Negative for congestion, ear pain, postnasal drip, rhinorrhea, sinus pressure, sneezing, sore throat and trouble swallowing.    Eyes:  Negative for pain, discharge and visual disturbance.   Respiratory:  Negative for cough, chest tightness, shortness of breath and wheezing.    Cardiovascular:  Negative for chest pain, palpitations and leg swelling.   Gastrointestinal:  Negative for abdominal distention, abdominal pain, blood in stool, constipation, diarrhea, nausea and vomiting.   Skin:  Negative for rash.         Objective:      Physical Exam  Constitutional:       General:  She is not in acute distress.     Appearance: Normal appearance. She is well-developed.   HENT:      Head: Normocephalic and atraumatic.      Right Ear: Hearing, tympanic membrane, ear canal and external ear normal.      Left Ear: Hearing, tympanic membrane, ear canal and external ear normal.      Nose: Nose normal.      Mouth/Throat:      Mouth: No oral lesions.      Pharynx: No oropharyngeal exudate or posterior oropharyngeal erythema.   Eyes:      General: Lids are normal. No scleral icterus.     Extraocular Movements: Extraocular movements intact.      Conjunctiva/sclera: Conjunctivae normal.      Pupils: Pupils are equal, round, and reactive to light.   Neck:      Thyroid: No thyroid mass or thyromegaly.      Vascular: No carotid bruit.   Cardiovascular:      Rate and Rhythm: Normal rate and regular rhythm. No extrasystoles are present.     Chest Wall: PMI is not displaced.      Heart sounds: Normal heart sounds. No murmur heard.     No gallop.   Pulmonary:      Effort: Pulmonary effort is normal. No accessory muscle usage or respiratory distress.      Breath sounds: Normal breath sounds.   Abdominal:      General: Bowel sounds are normal. There is no abdominal bruit.      Palpations: Abdomen is soft.      Tenderness: There is no abdominal tenderness. There is no rebound.   Musculoskeletal:      Cervical back: Normal range of motion and neck supple.   Lymphadenopathy:      Head:      Right side of head: No submental or submandibular adenopathy.      Left side of head: No submental or submandibular adenopathy.      Cervical:      Right cervical: No superficial, deep or posterior cervical adenopathy.     Left cervical: No superficial, deep or posterior cervical adenopathy.      Upper Body:      Right upper body: No supraclavicular adenopathy.      Left upper body: No supraclavicular adenopathy.   Skin:     General: Skin is warm and dry.   Neurological:      Mental Status: She is alert and oriented to person,  "place, and time.       Blood pressure 136/84, pulse 94, temperature 97.7 °F (36.5 °C), resp. rate 16, height 5' 6" (1.676 m), weight 65.8 kg (145 lb 1 oz), last menstrual period 05/23/2012, SpO2 99%.Body mass index is 23.41 kg/m².            A/P:  1)  diabetes.  Fairly well controlled.  We are going to stop the Janumet.  We are going to try Ozempic.  After she finishes her 1st Pen of Ozempic, she will let me know how she does with this.  We are going to continue with metformin.  If her sugars are consistently higher than 150, she will let me know.  She does have an upcoming appointment with endocrinology.  We will check an A1c in 3 months and see her back at that time.  2) hypertension.  Well controlled especially at home.  Continue with lisinopril   3) hyperlipidemia.  Well controlled.  Continue with Lipitor.  Recheck fasting lipid profile in 1 year  4) health maintenance issues.  She will receive her flu shot today and we will schedule her mammogram      "

## 2024-09-30 DIAGNOSIS — E11.9 TYPE 2 DIABETES MELLITUS WITHOUT COMPLICATIONS: ICD-10-CM

## 2024-09-30 DIAGNOSIS — I10 ESSENTIAL (PRIMARY) HYPERTENSION: ICD-10-CM

## 2024-10-01 RX ORDER — LISINOPRIL 5 MG/1
TABLET ORAL
Qty: 90 TABLET | Refills: 3 | Status: SHIPPED | OUTPATIENT
Start: 2024-10-01

## 2024-10-24 RX ORDER — ATORVASTATIN CALCIUM 20 MG/1
20 TABLET, FILM COATED ORAL
Qty: 90 TABLET | Refills: 3 | Status: SHIPPED | OUTPATIENT
Start: 2024-10-24

## 2024-10-24 NOTE — TELEPHONE ENCOUNTER
No care due was identified.  [facility] Embedded Care Due Messages. Reference number: [id number].   10/23/2024 10:07:15 PM CDT

## 2024-10-24 NOTE — TELEPHONE ENCOUNTER
Refill Decision Note   Katherine Rivers  is requesting a refill authorization.  Brief Assessment and Rationale for Refill:  Approve     Medication Therapy Plan:        Comments:     Note composed:12:35 AM 10/24/2024

## 2024-11-04 ENCOUNTER — HOSPITAL ENCOUNTER (OUTPATIENT)
Dept: RADIOLOGY | Facility: HOSPITAL | Age: 74
Discharge: HOME | End: 2024-11-04
Attending: FAMILY MEDICINE
Payer: MEDICARE

## 2024-11-04 DIAGNOSIS — Z12.39 ENCOUNTER FOR SCREENING FOR MALIGNANT NEOPLASM OF BREAST, UNSPECIFIED SCREENING MODALITY: ICD-10-CM

## 2024-11-04 DIAGNOSIS — Z12.31 ENCOUNTER FOR SCREENING MAMMOGRAM FOR MALIGNANT NEOPLASM OF BREAST: ICD-10-CM

## 2024-11-04 PROCEDURE — 77063 BREAST TOMOSYNTHESIS BI: CPT | Mod: 26,,, | Performed by: RADIOLOGY

## 2024-11-04 PROCEDURE — 77067 SCR MAMMO BI INCL CAD: CPT | Mod: 26,,, | Performed by: RADIOLOGY

## 2024-11-04 PROCEDURE — 77063 BREAST TOMOSYNTHESIS BI: CPT | Mod: TC,PO

## 2024-11-15 RX ORDER — BLOOD SUGAR DIAGNOSTIC
STRIP MISCELLANEOUS
Qty: 100 STRIP | Refills: 3 | Status: SHIPPED | OUTPATIENT
Start: 2024-11-15

## 2024-11-15 NOTE — TELEPHONE ENCOUNTER
No care due was identified.  [facility] Embedded Care Due Messages. Reference number: [id number].   11/15/2024 8:10:33 AM CST

## 2024-11-15 NOTE — TELEPHONE ENCOUNTER
Katherine Rivers  is requesting a refill authorization.  Brief Assessment and Rationale for Refill:  Approve     Medication Therapy Plan:         Comments:     Note composed:9:54 AM 11/15/2024

## 2024-12-03 ENCOUNTER — PATIENT MESSAGE (OUTPATIENT)
Dept: FAMILY MEDICINE | Facility: CLINIC | Age: 74
End: 2024-12-03
Payer: MEDICARE

## 2024-12-05 ENCOUNTER — TELEPHONE (OUTPATIENT)
Dept: ORTHOPEDICS | Facility: CLINIC | Age: 74
End: 2024-12-05
Payer: MEDICARE

## 2024-12-05 NOTE — TELEPHONE ENCOUNTER
Left message at given phone number requesting pt callback to schedule ortho appt for radius fracture 12/4/2024.

## 2024-12-06 DIAGNOSIS — M25.521 RIGHT ELBOW PAIN: Primary | ICD-10-CM

## 2024-12-11 ENCOUNTER — OFFICE VISIT (OUTPATIENT)
Dept: ORTHOPEDICS | Facility: CLINIC | Age: 74
End: 2024-12-11
Payer: MEDICARE

## 2024-12-11 ENCOUNTER — HOSPITAL ENCOUNTER (OUTPATIENT)
Dept: RADIOLOGY | Facility: HOSPITAL | Age: 74
Discharge: HOME | End: 2024-12-11
Attending: ORTHOPAEDIC SURGERY
Payer: MEDICARE

## 2024-12-11 DIAGNOSIS — M25.521 RIGHT ELBOW PAIN: ICD-10-CM

## 2024-12-11 DIAGNOSIS — S52.124A CLOSED NONDISPLACED FRACTURE OF HEAD OF RIGHT RADIUS, INITIAL ENCOUNTER: Primary | ICD-10-CM

## 2024-12-11 PROCEDURE — 99999 PR PBB SHADOW E&M-EST. PATIENT-LVL III: CPT | Mod: PBBFAC,,, | Performed by: ORTHOPAEDIC SURGERY

## 2024-12-11 PROCEDURE — 73080 X-RAY EXAM OF ELBOW: CPT | Mod: TC,PO,RT

## 2024-12-11 PROCEDURE — 73080 X-RAY EXAM OF ELBOW: CPT | Mod: 26,RT,, | Performed by: RADIOLOGY

## 2024-12-11 NOTE — PROGRESS NOTES
74 years old had a fall onto her right elbow about a week ago went to [facility] give him a splint comes today follow-up     Exam shows minimal tenderness of the radial head, no block to pronation supination hand is functioning well no outward signs of injury     X-rays show radial head fracture     Assessment: Right radial head fracture     Plan: Elbow brace, okay for gentle daily range of motion, follow up in a few weeks time is a postop visit with repeat x-rays of her right elbow      We performed a custom orthotic/brace fitting, adjusting and training with the patient. The patient demonstrated understanding and proper care. This was performed for 15 minutes.

## 2025-01-06 ENCOUNTER — LAB VISIT (OUTPATIENT)
Dept: LAB | Facility: HOSPITAL | Age: 75
End: 2025-01-06
Attending: FAMILY MEDICINE
Payer: MEDICARE

## 2025-01-06 DIAGNOSIS — E11.8 DIABETES MELLITUS TYPE 2 WITH COMPLICATIONS: ICD-10-CM

## 2025-01-06 LAB
ESTIMATED AVG GLUCOSE: 148 MG/DL (ref 68–131)
HBA1C MFR BLD: 6.8 % (ref 4–5.6)

## 2025-01-06 PROCEDURE — 83036 HEMOGLOBIN GLYCOSYLATED A1C: CPT | Performed by: FAMILY MEDICINE

## 2025-01-06 PROCEDURE — 36415 COLL VENOUS BLD VENIPUNCTURE: CPT | Mod: PO | Performed by: FAMILY MEDICINE

## 2025-01-07 DIAGNOSIS — S52.124A CLOSED NONDISPLACED FRACTURE OF HEAD OF RIGHT RADIUS, INITIAL ENCOUNTER: Primary | ICD-10-CM

## 2025-01-08 ENCOUNTER — OFFICE VISIT (OUTPATIENT)
Dept: ORTHOPEDICS | Facility: CLINIC | Age: 75
End: 2025-01-08
Payer: MEDICARE

## 2025-01-08 ENCOUNTER — HOSPITAL ENCOUNTER (OUTPATIENT)
Dept: RADIOLOGY | Facility: HOSPITAL | Age: 75
Discharge: HOME | End: 2025-01-08
Attending: ORTHOPAEDIC SURGERY
Payer: MEDICARE

## 2025-01-08 ENCOUNTER — PATIENT MESSAGE (OUTPATIENT)
Dept: FAMILY MEDICINE | Facility: CLINIC | Age: 75
End: 2025-01-08
Payer: MEDICARE

## 2025-01-08 VITALS — BODY MASS INDEX: 23.31 KG/M2 | WEIGHT: 145.06 LBS | HEIGHT: 66 IN

## 2025-01-08 DIAGNOSIS — S52.124A CLOSED NONDISPLACED FRACTURE OF HEAD OF RIGHT RADIUS, INITIAL ENCOUNTER: Primary | ICD-10-CM

## 2025-01-08 DIAGNOSIS — S52.124A CLOSED NONDISPLACED FRACTURE OF HEAD OF RIGHT RADIUS, INITIAL ENCOUNTER: ICD-10-CM

## 2025-01-08 PROCEDURE — 3288F FALL RISK ASSESSMENT DOCD: CPT | Mod: CPTII,S$GLB,, | Performed by: ORTHOPAEDIC SURGERY

## 2025-01-08 PROCEDURE — 73080 X-RAY EXAM OF ELBOW: CPT | Mod: 26,RT,, | Performed by: RADIOLOGY

## 2025-01-08 PROCEDURE — 1159F MED LIST DOCD IN RCRD: CPT | Mod: CPTII,S$GLB,, | Performed by: ORTHOPAEDIC SURGERY

## 2025-01-08 PROCEDURE — 73080 X-RAY EXAM OF ELBOW: CPT | Mod: TC,PO,RT

## 2025-01-08 PROCEDURE — 99999 PR PBB SHADOW E&M-EST. PATIENT-LVL III: CPT | Mod: PBBFAC,,, | Performed by: ORTHOPAEDIC SURGERY

## 2025-01-08 PROCEDURE — 1157F ADVNC CARE PLAN IN RCRD: CPT | Mod: CPTII,S$GLB,, | Performed by: ORTHOPAEDIC SURGERY

## 2025-01-08 PROCEDURE — 1126F AMNT PAIN NOTED NONE PRSNT: CPT | Mod: CPTII,S$GLB,, | Performed by: ORTHOPAEDIC SURGERY

## 2025-01-08 PROCEDURE — 3044F HG A1C LEVEL LT 7.0%: CPT | Mod: CPTII,S$GLB,, | Performed by: ORTHOPAEDIC SURGERY

## 2025-01-08 PROCEDURE — 99024 POSTOP FOLLOW-UP VISIT: CPT | Mod: S$GLB,,, | Performed by: ORTHOPAEDIC SURGERY

## 2025-01-08 PROCEDURE — 1101F PT FALLS ASSESS-DOCD LE1/YR: CPT | Mod: CPTII,S$GLB,, | Performed by: ORTHOPAEDIC SURGERY

## 2025-01-08 NOTE — PROGRESS NOTES
74 years old month out from radial head fracture doing well reports no pain     Exam shows full motion of the elbow no block to pronation supination nontender     X-rays show healing radial head fracture     Plan:  Continue with activity modifications, continue with gentle range of motion, follow up in 2 months time as a final postop visit with x-rays of her right elbow

## 2025-01-09 ENCOUNTER — OFFICE VISIT (OUTPATIENT)
Dept: FAMILY MEDICINE | Facility: CLINIC | Age: 75
End: 2025-01-09
Payer: MEDICARE

## 2025-01-09 VITALS
HEIGHT: 66 IN | BODY MASS INDEX: 22 KG/M2 | DIASTOLIC BLOOD PRESSURE: 82 MMHG | WEIGHT: 136.88 LBS | OXYGEN SATURATION: 99 % | HEART RATE: 107 BPM | RESPIRATION RATE: 16 BRPM | TEMPERATURE: 98 F | SYSTOLIC BLOOD PRESSURE: 126 MMHG

## 2025-01-09 DIAGNOSIS — I15.2 HYPERTENSION ASSOCIATED WITH DIABETES: ICD-10-CM

## 2025-01-09 DIAGNOSIS — E78.5 HYPERLIPIDEMIA ASSOCIATED WITH TYPE 2 DIABETES MELLITUS: ICD-10-CM

## 2025-01-09 DIAGNOSIS — E11.59 HYPERTENSION ASSOCIATED WITH DIABETES: ICD-10-CM

## 2025-01-09 DIAGNOSIS — E11.69 HYPERLIPIDEMIA ASSOCIATED WITH TYPE 2 DIABETES MELLITUS: ICD-10-CM

## 2025-01-09 DIAGNOSIS — E11.8 DIABETES MELLITUS TYPE 2 WITH COMPLICATIONS: Primary | ICD-10-CM

## 2025-01-09 PROCEDURE — 3044F HG A1C LEVEL LT 7.0%: CPT | Mod: CPTII,S$GLB,, | Performed by: FAMILY MEDICINE

## 2025-01-09 PROCEDURE — 1157F ADVNC CARE PLAN IN RCRD: CPT | Mod: CPTII,S$GLB,, | Performed by: FAMILY MEDICINE

## 2025-01-09 PROCEDURE — 3008F BODY MASS INDEX DOCD: CPT | Mod: CPTII,S$GLB,, | Performed by: FAMILY MEDICINE

## 2025-01-09 PROCEDURE — 3288F FALL RISK ASSESSMENT DOCD: CPT | Mod: CPTII,S$GLB,, | Performed by: FAMILY MEDICINE

## 2025-01-09 PROCEDURE — G2211 COMPLEX E/M VISIT ADD ON: HCPCS | Mod: S$GLB,,, | Performed by: FAMILY MEDICINE

## 2025-01-09 PROCEDURE — 1159F MED LIST DOCD IN RCRD: CPT | Mod: CPTII,S$GLB,, | Performed by: FAMILY MEDICINE

## 2025-01-09 PROCEDURE — 3079F DIAST BP 80-89 MM HG: CPT | Mod: CPTII,S$GLB,, | Performed by: FAMILY MEDICINE

## 2025-01-09 PROCEDURE — 1160F RVW MEDS BY RX/DR IN RCRD: CPT | Mod: CPTII,S$GLB,, | Performed by: FAMILY MEDICINE

## 2025-01-09 PROCEDURE — 99214 OFFICE O/P EST MOD 30 MIN: CPT | Mod: S$GLB,,, | Performed by: FAMILY MEDICINE

## 2025-01-09 PROCEDURE — 1100F PTFALLS ASSESS-DOCD GE2>/YR: CPT | Mod: CPTII,S$GLB,, | Performed by: FAMILY MEDICINE

## 2025-01-09 PROCEDURE — 3074F SYST BP LT 130 MM HG: CPT | Mod: CPTII,S$GLB,, | Performed by: FAMILY MEDICINE

## 2025-01-09 PROCEDURE — 1126F AMNT PAIN NOTED NONE PRSNT: CPT | Mod: CPTII,S$GLB,, | Performed by: FAMILY MEDICINE

## 2025-01-09 RX ORDER — ESTRADIOL 0.1 MG/G
1 CREAM VAGINAL DAILY
Qty: 30 G | Refills: 1 | Status: SHIPPED | OUTPATIENT
Start: 2025-01-09 | End: 2026-01-09

## 2025-01-09 NOTE — PROGRESS NOTES
Subjective:       Patient ID: Katherine Rivers is a 74 y.o. female.    Chief Complaint: Follow-up    History of Present Illness    CHIEF COMPLAINT:  Patient presents today for a follow-up visit.  Overall, she is doing well except for a recent radial head fracture.  She fractured her arm on December 4th after tripping over a  door. The arm was initially placed in a sling and later stabilized with a brace. She is following with the orthopedist for this    DIABETES:  Her A1C level have decreased with her recent A1c being 6.8 (down from prior value of 7.2).  At her last visit in September, we initiated Ozempic.  She was able to take the Ozempic during October and November.  In December, she was not able to get the Ozempic (due to cost) and, at my direction, resumed an old supply of Metformin.  She is now back to her Ozempic 0.5 mg once a week and metformin 1000 mg once a day. She reports an 8-10 pound weight loss with Ozempic. Her fasting blood sugars are generally good, sometimes as low as 99, though it was 163 this morning, possibly due to reduced physical activity.    HYPERTENSION:  Today her blood pressure is 126/82.  She is taking her lisinopril which she is tolerating well.    GENITOURINARY:  She continues Vagifem for vaginal dryness and takes Macrobid prophylactically for UTI prevention when traveling.  She would like a refill for topical estradiol which was given to her by the urology team for prophylactic UTI measures.  One tube of the estradiol lasts quite some time as she uses it very sparingly        Review of Systems   Constitutional:  Negative for appetite change, chills, diaphoresis, fatigue, fever and unexpected weight change.   HENT:  Negative for congestion, ear pain, postnasal drip, rhinorrhea, sinus pressure, sneezing, sore throat and trouble swallowing.    Eyes:  Negative for pain, discharge and visual disturbance.   Respiratory:  Negative for cough, chest tightness, shortness of breath and  This note was copied from a baby's chart.  Met with mother infant dyad in the NICU at infant’s bedside. Discussed feeding cues, frequency of feedings, typical intake and output for infant’s gestation/diagnosis, positioning infant, deep latch versus shallow latch, nutritive sucking versus non-nutritive sucking and signs of a good breast-feeding session. Infant showing hunger signs. After several; attempts to get infant latched offered 10 mls breast milk then tried again.  Mother held infant in cross cradle position and with compression hold infant was able to get a deep latch. Infant was able to get a deep latch and breast fed for about 10 min with some swallowing heard. Mother did have a compression stripe on nipple. Mother verbalized her concern for tongue and lip tie. Infant's upper lip does stretch but infant's tongue doesn't go past gum line and mother had compression stripe so concern for her reaching her breast feeding goals. Notified infant's RN. Infant tolerated feeding well.  Encouraged mother to contact the lactation team with any questions or concerns and if mother would like to set up another breast-feeding session. Mother verbalized understanding of information and denies further questions currently. LC to follow for 0800 breast feeding session and as needed.    wheezing.    Cardiovascular:  Negative for chest pain, palpitations and leg swelling.   Gastrointestinal:  Negative for abdominal distention, abdominal pain, blood in stool, constipation, diarrhea, nausea and vomiting.   Skin:  Negative for rash.         Objective:      Physical Exam  Constitutional:       General: She is not in acute distress.     Appearance: Normal appearance. She is well-developed.   HENT:      Head: Normocephalic and atraumatic.      Right Ear: Hearing, tympanic membrane, ear canal and external ear normal.      Left Ear: Hearing, tympanic membrane, ear canal and external ear normal.      Nose: Nose normal.      Mouth/Throat:      Mouth: No oral lesions.      Pharynx: No oropharyngeal exudate or posterior oropharyngeal erythema.   Eyes:      General: Lids are normal. No scleral icterus.     Extraocular Movements: Extraocular movements intact.      Conjunctiva/sclera: Conjunctivae normal.      Pupils: Pupils are equal, round, and reactive to light.   Neck:      Thyroid: No thyroid mass or thyromegaly.      Vascular: No carotid bruit.   Cardiovascular:      Rate and Rhythm: Normal rate and regular rhythm. No extrasystoles are present.     Chest Wall: PMI is not displaced.      Heart sounds: Normal heart sounds. No murmur heard.     No gallop.   Pulmonary:      Effort: Pulmonary effort is normal. No accessory muscle usage or respiratory distress.      Breath sounds: Normal breath sounds.   Abdominal:      General: Bowel sounds are normal. There is no abdominal bruit.      Palpations: Abdomen is soft.      Tenderness: There is no abdominal tenderness. There is no rebound.   Musculoskeletal:      Cervical back: Normal range of motion and neck supple.   Lymphadenopathy:      Head:      Right side of head: No submental or submandibular adenopathy.      Left side of head: No submental or submandibular adenopathy.      Cervical:      Right cervical: No superficial, deep or posterior cervical adenopathy.    "  Left cervical: No superficial, deep or posterior cervical adenopathy.      Upper Body:      Right upper body: No supraclavicular adenopathy.      Left upper body: No supraclavicular adenopathy.   Skin:     General: Skin is warm and dry.   Neurological:      Mental Status: She is alert and oriented to person, place, and time.       Blood pressure 126/82, pulse 107, temperature 97.9 °F (36.6 °C), resp. rate 16, height 5' 6" (1.676 m), weight 62.1 kg (136 lb 14.5 oz), last menstrual period 05/23/2012, SpO2 99%.Body mass index is 22.1 kg/m².        1. Diabetes mellitus type 2 with complications    2. Hypertension associated with diabetes    3. Hyperlipidemia associated with type 2 diabetes mellitus        Assessment & Plan    1. Continued Ozempic 0.5 mg due to good A1C reduction (from 7.2 to 6.8) and 9 lb weight loss, despite 1 month without the medication over the holidays  2. Maintained current Ozempic dose, considering effectiveness and patient preference  3. Reduced Metformin to daily based on patient's report of stable glucose levels (around 126, not over 130 most of the time)  4. Discontinued Janumet, replaced with Ozempic and reduced Metformin regimen  5. Continued Vagifem for vaginal health  6. Continued Macrobid for UTI prevention, noting good control of UTIs at home    TYPE 2 DIABETES MELLITUS WITH OTHER CIRCULATORY COMPLICATIONS:  - Evaluated the patient's A1C, which has improved from 7.2 to 6.8.  - Noted fasting blood sugar readings are generally good, with some readings as low as 99, although this morning's reading was higher at 163.  - Observed blood sugar are consistently in the 120s, with a range of 125-130 considered good.  - Noted weight decrease from 145 lbs in September to 136 lbs today, a loss of 9 lbs.  - Discussed the mechanism of Ozempic, including its effects on appetite and digestion speed.  - Continued Ozempic 0.5 mg weekly.  - Decreased Metformin 1000 mg daily.  - Discontinued Janumet.  - " Scheduled follow-up visit for August with labs prior.    ARM FRACTURE:  - follow up with ortho as planned    MENOPAUSAL SYMPTOMS:  - Continued Vagifem.  - Refilled topical vaginal cream for atrophy issues increasing her risk for UTIs.    HYPERTENSION:  - continue with lisinopril    HYPERLIPIDEMIA:   - continue with Lipitor.    - Recheck fasting lipid profile at next visit    As long as she does well, I will see her back in August with labs prior to that visit        This note was generated with the assistance of ambient listening technology. Verbal consent was obtained by the patient and accompanying visitor(s) for the recording of patient appointment to facilitate this note. I attest to having reviewed and edited the generated note for accuracy, though some syntax or spelling errors may persist. Please contact the author of this note for any clarification.

## 2025-01-22 ENCOUNTER — PATIENT MESSAGE (OUTPATIENT)
Dept: ADMINISTRATIVE | Facility: HOSPITAL | Age: 75
End: 2025-01-22
Payer: MEDICARE

## 2025-01-27 ENCOUNTER — PATIENT OUTREACH (OUTPATIENT)
Dept: ADMINISTRATIVE | Facility: HOSPITAL | Age: 75
End: 2025-01-27
Payer: MEDICARE

## 2025-01-27 DIAGNOSIS — Z78.0 POSTMENOPAUSAL: Primary | ICD-10-CM

## 2025-01-27 NOTE — PROGRESS NOTES
Population Health Chart Review & Patient Outreach Details      Additional [facility] Health Notes:               Updates Requested / Reviewed:      Updated Care Coordination Note and Immunizations Reconciliation Completed or Queried: [facility] Topics Overdue:      [facility] Score: 0     Patient is not due for any topics at this time.    RSV Vaccine                  Health Maintenance Topic(s) Outreach Outcomes & Actions Taken:    Osteoporosis Screening - Outreach Outcomes & Actions Taken  : Dexa Order Placed and Dexa Appointment Scheduled

## 2025-02-11 RX ORDER — AMITRIPTYLINE HYDROCHLORIDE 10 MG/1
10 TABLET, FILM COATED ORAL
Qty: 90 TABLET | Refills: 3 | Status: SHIPPED | OUTPATIENT
Start: 2025-02-11

## 2025-02-11 NOTE — TELEPHONE ENCOUNTER
No care due was identified.  [facility] Embedded Care Due Messages. Reference number: [id number].   2/11/2025 2:46:07 PM CST

## 2025-02-11 NOTE — TELEPHONE ENCOUNTER
Refill Decision Note   Katherine Rivers  is requesting a refill authorization.  Brief Assessment and Rationale for Refill:  Approve     Medication Therapy Plan:        Comments:     Note composed:5:20 PM 02/11/2025

## 2025-02-16 ENCOUNTER — PATIENT MESSAGE (OUTPATIENT)
Dept: FAMILY MEDICINE | Facility: CLINIC | Age: 75
End: 2025-02-16
Payer: MEDICARE

## 2025-02-16 RX ORDER — OMEPRAZOLE 10 MG/1
10 CAPSULE, DELAYED RELEASE ORAL DAILY
Qty: 90 CAPSULE | Refills: 3 | Status: SHIPPED | OUTPATIENT
Start: 2025-02-16 | End: 2025-02-17

## 2025-02-16 NOTE — TELEPHONE ENCOUNTER
Katherine Rivers  is requesting a refill authorization.  Brief Assessment and Rationale for Refill:  Approve     Medication Therapy Plan:         Comments:     Note composed:4:31 PM 02/16/2025

## 2025-02-16 NOTE — TELEPHONE ENCOUNTER
No care due was identified.  [facility] Embedded Care Due Messages. Reference number: [id number].   2/15/2025 10:08:41 PM CST

## 2025-02-17 RX ORDER — PHENOL/SODIUM PHENOLATE
20 AEROSOL, SPRAY (ML) MUCOUS MEMBRANE DAILY
Qty: 90 EACH | Refills: 0 | Status: SHIPPED | OUTPATIENT
Start: 2025-02-17 | End: 2026-02-17

## 2025-02-17 NOTE — TELEPHONE ENCOUNTER
No care due was identified.  [facility] Embedded Care Due Messages. Reference number: [id number].   2/17/2025 10:02:04 AM CST

## 2025-03-05 ENCOUNTER — HOSPITAL ENCOUNTER (OUTPATIENT)
Dept: RADIOLOGY | Facility: HOSPITAL | Age: 75
Discharge: HOME | End: 2025-03-05
Attending: FAMILY MEDICINE
Payer: MEDICARE

## 2025-03-05 DIAGNOSIS — Z78.0 POSTMENOPAUSAL: ICD-10-CM

## 2025-03-05 PROCEDURE — 77092 TBS I&R FX RSK QHP: CPT | Mod: ,,, | Performed by: RADIOLOGY

## 2025-03-05 PROCEDURE — 77080 DXA BONE DENSITY AXIAL: CPT | Mod: 26,,, | Performed by: RADIOLOGY

## 2025-03-05 PROCEDURE — 77080 DXA BONE DENSITY AXIAL: CPT | Mod: TC,PO

## 2025-03-10 ENCOUNTER — PATIENT MESSAGE (OUTPATIENT)
Dept: FAMILY MEDICINE | Facility: CLINIC | Age: 75
End: 2025-03-10
Payer: MEDICARE

## 2025-03-21 RX ORDER — METFORMIN HYDROCHLORIDE 1000 MG/1
1000 TABLET ORAL 2 TIMES DAILY WITH MEALS
Qty: 180 TABLET | Refills: 0 | Status: SHIPPED | OUTPATIENT
Start: 2025-03-21

## 2025-03-21 NOTE — TELEPHONE ENCOUNTER
Refill Decision Note   Katherine Rivers  is requesting a refill authorization.  Brief Assessment and Rationale for Refill:  Approve     Medication Therapy Plan:         Comments:     Note composed:1:52 AM 03/21/2025

## 2025-03-21 NOTE — TELEPHONE ENCOUNTER
No care due was identified.  [facility] Embedded Care Due Messages. Reference number: [id number].   3/21/2025 12:06:25 AM CDT

## 2025-03-24 DIAGNOSIS — Z00.00 ENCOUNTER FOR MEDICARE ANNUAL WELLNESS EXAM: ICD-10-CM

## 2025-03-26 ENCOUNTER — PATIENT MESSAGE (OUTPATIENT)
Dept: UROGYNECOLOGY | Facility: CLINIC | Age: 75
End: 2025-03-26
Payer: MEDICARE

## 2025-03-27 RX ORDER — TRIMETHOPRIM 100 MG/1
100 TABLET ORAL NIGHTLY
Qty: 42 TABLET | Refills: 2 | Status: SHIPPED | OUTPATIENT
Start: 2025-03-27

## 2025-04-05 NOTE — TELEPHONE ENCOUNTER
No care due was identified.  [facility] Embedded Care Due Messages. Reference number: [id number].   4/05/2025 6:53:36 AM CDT

## 2025-04-07 RX ORDER — ESTRADIOL 10 UG/1
TABLET VAGINAL
Qty: 24 TABLET | Refills: 3 | Status: SHIPPED | OUTPATIENT
Start: 2025-04-07

## 2025-04-07 NOTE — TELEPHONE ENCOUNTER
Refill Routing Note   Medication(s) are not appropriate for processing by [facility] for the following reason(s):        Drug-drug interaction:     [facility] action(s):  Defer    Medication Therapy Plan: Duplicate Medication/Therapy: estradioL, YUVAFEM    Pharmacist review requested: Yes     Appointments  past 12m or future 3m with PCP    Date Provider   Last Visit   1/9/2025 Jennifer Casillas MD   Next Visit   8/28/2025 Jennifer Casillas MD   ED visits in past 90 days: 0        Note composed:7:51 AM 04/07/2025

## 2025-04-07 NOTE — TELEPHONE ENCOUNTER
Refill Decision Note   Katherine Rivers  is requesting a refill authorization.  Brief Assessment and Rationale for Refill:  Approve     Medication Therapy Plan:         Pharmacist review requested: Yes   Extended chart review required: Yes   Comments:     Note composed:11:15 AM 04/07/2025

## 2025-04-15 ENCOUNTER — OFFICE VISIT (OUTPATIENT)
Dept: UROGYNECOLOGY | Facility: CLINIC | Age: 75
End: 2025-04-15
Payer: MEDICARE

## 2025-04-15 DIAGNOSIS — R35.0 URINARY FREQUENCY: Primary | ICD-10-CM

## 2025-04-15 LAB
BILIRUBIN, UA POC OHS: NEGATIVE
BLOOD, UA POC OHS: NEGATIVE
CLARITY, UA POC OHS: CLEAR
COLOR, UA POC OHS: YELLOW
GLUCOSE, UA POC OHS: NEGATIVE
KETONES, UA POC OHS: NEGATIVE
LEUKOCYTES, UA POC OHS: NEGATIVE
NITRITE, UA POC OHS: NEGATIVE
PH, UA POC OHS: 6.5
PROTEIN, UA POC OHS: NEGATIVE
SPECIFIC GRAVITY, UA POC OHS: 1.01
UROBILINOGEN, UA POC OHS: 0.2

## 2025-04-15 PROCEDURE — 99999 PR PBB SHADOW E&M-EST. PATIENT-LVL III: CPT | Mod: PBBFAC,,, | Performed by: OBSTETRICS & GYNECOLOGY

## 2025-04-15 PROCEDURE — 4010F ACE/ARB THERAPY RXD/TAKEN: CPT | Mod: CPTII,S$GLB,, | Performed by: OBSTETRICS & GYNECOLOGY

## 2025-04-15 PROCEDURE — 99213 OFFICE O/P EST LOW 20 MIN: CPT | Mod: S$GLB,,, | Performed by: OBSTETRICS & GYNECOLOGY

## 2025-04-15 PROCEDURE — 3288F FALL RISK ASSESSMENT DOCD: CPT | Mod: CPTII,S$GLB,, | Performed by: OBSTETRICS & GYNECOLOGY

## 2025-04-15 PROCEDURE — 1101F PT FALLS ASSESS-DOCD LE1/YR: CPT | Mod: CPTII,S$GLB,, | Performed by: OBSTETRICS & GYNECOLOGY

## 2025-04-15 PROCEDURE — 81003 URINALYSIS AUTO W/O SCOPE: CPT | Mod: QW,S$GLB,, | Performed by: OBSTETRICS & GYNECOLOGY

## 2025-04-15 PROCEDURE — 1126F AMNT PAIN NOTED NONE PRSNT: CPT | Mod: CPTII,S$GLB,, | Performed by: OBSTETRICS & GYNECOLOGY

## 2025-04-15 PROCEDURE — 1157F ADVNC CARE PLAN IN RCRD: CPT | Mod: CPTII,S$GLB,, | Performed by: OBSTETRICS & GYNECOLOGY

## 2025-04-15 PROCEDURE — 1159F MED LIST DOCD IN RCRD: CPT | Mod: CPTII,S$GLB,, | Performed by: OBSTETRICS & GYNECOLOGY

## 2025-04-15 PROCEDURE — 3044F HG A1C LEVEL LT 7.0%: CPT | Mod: CPTII,S$GLB,, | Performed by: OBSTETRICS & GYNECOLOGY

## 2025-04-15 RX ORDER — NITROFURANTOIN 25; 75 MG/1; MG/1
100 CAPSULE ORAL 2 TIMES DAILY
Qty: 10 CAPSULE | Refills: 3 | Status: SHIPPED | OUTPATIENT
Start: 2025-04-15

## 2025-04-15 RX ORDER — TRIMETHOPRIM 100 MG/1
100 TABLET ORAL NIGHTLY
Qty: 42 TABLET | Refills: 8 | Status: SHIPPED | OUTPATIENT
Start: 2025-04-15

## 2025-04-15 NOTE — PROGRESS NOTES
Subjective:     Chief Complaint:  Recurrent UTI  History of Present Illness: Katherine Rivers is a 74 y.o. female who presents for recurrent UTIs.  She has done well on prophylaxis is now taking trimethoprim daily.  One point we tried to discontinue it but she felt uncomfortable doing that and is now continuing on a regular basis.  She questioned whether she could increase the dose to twice a day when she is traveling because that is when she tends to get her infections.  She keeps a prescription for nitrofurantoin for traveling case she does develop a UTI.  She is having no side effects is happy with the present regimen    Review of Systems    Constitutional: No acute distress. No weight gain/loss.  Eyes: No vision changes.  ENT:  Hearing loss  Respiratory:  Nonsmoker  Cardiovascular: Hypertension hyperlipidemia  Gastrointestinal:  GERD  Genitourinary: No vaginal bleeding or discharge.  Integument/Breast: Negative  Hematologic/Lymphatic: No history of anemia.  Musculoskeletal:  Osteopenia  Neurological:  Low back pain  Behavioral/Psych: No history of depression.   Endocrine: No hormonal replacement.  Allergy/Immune: no recent reactions     Objective:   General Exam:  General appearance: WDNF. NAD.   HEENT: [place]. EOM's intact.  Neck: Normal thyroid.   Back: No CVA tenderness.  RESP: No SOB.  Breasts: deferred  Abdomen: Benign without localizing signs.  Extremities: No edema. No varices.  Lymphatic: noncontributory  Skin: No rashes. No lesions.  Neurologic: Intact.   Psych: Oriented.       Assessment:   Has done well with prophylaxis.  We discussed ongoing prophylaxis avoidance of UTIs when she travels       Plan:      Will refill trimethoprim as well as a travel prescription of nitrofurantoin

## 2025-05-14 ENCOUNTER — PATIENT OUTREACH (OUTPATIENT)
Dept: ADMINISTRATIVE | Facility: HOSPITAL | Age: 75
End: 2025-05-14
Payer: MEDICARE

## 2025-05-14 DIAGNOSIS — E11.9 TYPE 2 DIABETES MELLITUS WITHOUT COMPLICATION, WITHOUT LONG-TERM CURRENT USE OF INSULIN: Primary | ICD-10-CM

## 2025-05-14 NOTE — PROGRESS NOTES
Population Health Chart Review & Patient Outreach Details      Additional [facility] Health Notes:               Updates Requested / Reviewed:      Updated Care Coordination Note and Immunizations Reconciliation Completed or Queried: [place]         Health Maintenance Topics Overdue:      [facility] Score: 1     Foot Exam    RSV Vaccine                  Health Maintenance Topic(s) Outreach Outcomes & Actions Taken:    Lab(s) - Outreach Outcomes & Actions Taken  : Overdue Lab(s) Ordered and Overdue Lab(s) Scheduled

## 2025-05-16 RX ORDER — OMEPRAZOLE 20 MG/1
20 CAPSULE, DELAYED RELEASE ORAL
Qty: 90 CAPSULE | Refills: 2 | Status: SHIPPED | OUTPATIENT
Start: 2025-05-16

## 2025-05-16 NOTE — TELEPHONE ENCOUNTER
Refill Routing Note   Medication(s) are not appropriate for processing by [facility] for the following reason(s):        New or recently adjusted medication    [facility] action(s):  Defer        Medication Therapy Plan: FLOS 08/21/25      Appointments  past 12m or future 3m with PCP    Date Provider   Last Visit   1/9/2025 Jennifer Casillas MD   Next Visit   8/28/2025 Jennifer Casillas MD   ED visits in past 90 days: 0        Note composed:4:25 AM 05/16/2025

## 2025-05-16 NOTE — TELEPHONE ENCOUNTER
Care Due:                  Date            Visit Type   Department     Provider  --------------------------------------------------------------------------------                                EP -                              PRIMARY      [facility] Jennifer Beckwithwilian  Last Visit: 01-      CARE ([facility])   MEDICINE       Anger                              EP -                              PRIMARY      [facility]    Jennifer Gonzalez Sonja  Next Visit: 08-      CARE ([facility])   MEDICINE       Anger                                                            Last  Test          Frequency    Reason                     Performed    Due Date  --------------------------------------------------------------------------------    HBA1C.......  6 months...  metFORMIN, semaglutide...  01- 07-    Health [facility] Embedded Care Due Messages. Reference number: [id number].   5/16/2025 1:07:22 AM CDT

## 2025-06-04 ENCOUNTER — PATIENT MESSAGE (OUTPATIENT)
Dept: FAMILY MEDICINE | Facility: CLINIC | Age: 75
End: 2025-06-04
Payer: MEDICARE

## 2025-06-05 RX ORDER — ESTRADIOL 0.1 MG/G
1 CREAM VAGINAL DAILY
Qty: 42.5 G | Refills: 1 | Status: SHIPPED | OUTPATIENT
Start: 2025-06-05 | End: 2026-06-05

## 2025-06-21 NOTE — TELEPHONE ENCOUNTER
Care Due:                  Date            Visit Type   Department     Provider  --------------------------------------------------------------------------------                                EP -                              PRIMARY      [facility] Jennifer Casillas  Last Visit: 01-      CARE ([facility])   MEDICINE       Anger                              EP -                              PRIMARY      [facility]    Jennifer Casillas  Next Visit: 08-      CARE ([facility])   MEDICINE       Anger                                                            Last  Test          Frequency    Reason                     Performed    Due Date  --------------------------------------------------------------------------------    CMP.........  12 months..  atorvastatin, lisinopriL,   09- 09-                             metFORMIN................    Lipid Panel.  12 months..  atorvastatin.............  09- 09-    [facility] Embedded Care Due Messages. Reference number: [id number].   6/21/2025 6:55:54 AM CDT

## 2025-06-22 RX ORDER — METFORMIN HYDROCHLORIDE 1000 MG/1
1000 TABLET ORAL 2 TIMES DAILY WITH MEALS
Qty: 180 TABLET | Refills: 0 | Status: SHIPPED | OUTPATIENT
Start: 2025-06-22

## 2025-06-22 NOTE — TELEPHONE ENCOUNTER
Refill Decision Note   Katherine Rivers  is requesting a refill authorization.  Brief Assessment and Rationale for Refill:  Approve     Medication Therapy Plan:  FLOS      Comments:     Note composed:8:09 AM 06/22/2025

## 2025-07-07 ENCOUNTER — PATIENT MESSAGE (OUTPATIENT)
Dept: UROGYNECOLOGY | Facility: CLINIC | Age: 75
End: 2025-07-07
Payer: MEDICARE

## 2025-07-08 RX ORDER — METHENAMINE MANDELATE 1000 MG/1
1 TABLET, FILM COATED ORAL 2 TIMES DAILY
Qty: 60 TABLET | Refills: 1 | Status: SHIPPED | OUTPATIENT
Start: 2025-07-08

## 2025-07-08 NOTE — TELEPHONE ENCOUNTER
Patient wants to try Hiprex is leaving on july 14th for [place]. Is this something to send in for her?

## 2025-08-04 ENCOUNTER — PATIENT MESSAGE (OUTPATIENT)
Dept: UROGYNECOLOGY | Facility: CLINIC | Age: 75
End: 2025-08-04
Payer: MEDICARE

## 2025-08-05 ENCOUNTER — TELEPHONE (OUTPATIENT)
Dept: UROGYNECOLOGY | Facility: CLINIC | Age: 75
End: 2025-08-05
Payer: MEDICARE

## 2025-08-05 ENCOUNTER — PATIENT MESSAGE (OUTPATIENT)
Dept: UROGYNECOLOGY | Facility: CLINIC | Age: 75
End: 2025-08-05
Payer: MEDICARE

## 2025-08-21 ENCOUNTER — LAB VISIT (OUTPATIENT)
Dept: LAB | Facility: HOSPITAL | Age: 75
End: 2025-08-21
Attending: FAMILY MEDICINE
Payer: MEDICARE

## 2025-08-21 ENCOUNTER — PATIENT MESSAGE (OUTPATIENT)
Dept: FAMILY MEDICINE | Facility: CLINIC | Age: 75
End: 2025-08-21
Payer: MEDICARE

## 2025-08-21 DIAGNOSIS — E78.5 HYPERLIPIDEMIA ASSOCIATED WITH TYPE 2 DIABETES MELLITUS: ICD-10-CM

## 2025-08-21 DIAGNOSIS — E11.69 HYPERLIPIDEMIA ASSOCIATED WITH TYPE 2 DIABETES MELLITUS: ICD-10-CM

## 2025-08-21 DIAGNOSIS — E11.59 HYPERTENSION ASSOCIATED WITH DIABETES: ICD-10-CM

## 2025-08-21 DIAGNOSIS — I15.2 HYPERTENSION ASSOCIATED WITH DIABETES: ICD-10-CM

## 2025-08-21 DIAGNOSIS — E11.8 DIABETES MELLITUS TYPE 2 WITH COMPLICATIONS: ICD-10-CM

## 2025-08-21 LAB
ABSOLUTE EOSINOPHIL (OHS): 0.47 K/UL
ABSOLUTE MONOCYTE (OHS): 0.72 K/UL (ref 0.3–1)
ABSOLUTE NEUTROPHIL COUNT (OHS): 4.17 K/UL (ref 1.8–7.7)
ALBUMIN SERPL BCP-MCNC: 4 G/DL (ref 3.5–5.2)
ALP SERPL-CCNC: 55 UNIT/L (ref 40–150)
ALT SERPL W/O P-5'-P-CCNC: 26 UNIT/L (ref 0–55)
ANION GAP (OHS): 10 MMOL/L (ref 8–16)
AST SERPL-CCNC: 29 UNIT/L (ref 0–50)
BASOPHILS # BLD AUTO: 0.12 K/UL
BASOPHILS NFR BLD AUTO: 1.2 %
BILIRUB SERPL-MCNC: 0.6 MG/DL (ref 0.1–1)
BUN SERPL-MCNC: 9 MG/DL (ref 8–23)
CALCIUM SERPL-MCNC: 9.9 MG/DL (ref 8.7–10.5)
CHLORIDE SERPL-SCNC: 101 MMOL/L (ref 95–110)
CHOLEST SERPL-MCNC: 136 MG/DL (ref 120–199)
CHOLEST/HDLC SERPL: 2.7 {RATIO} (ref 2–5)
CO2 SERPL-SCNC: 26 MMOL/L (ref 23–29)
CREAT SERPL-MCNC: 0.7 MG/DL (ref 0.5–1.4)
EAG (OHS): 131 MG/DL (ref 68–131)
ERYTHROCYTE [DISTWIDTH] IN BLOOD BY AUTOMATED COUNT: 14 % (ref 11.5–14.5)
GFR SERPLBLD CREATININE-BSD FMLA CKD-EPI: >60 ML/MIN/1.73/M2
GLUCOSE SERPL-MCNC: 107 MG/DL (ref 70–110)
HBA1C MFR BLD: 6.2 % (ref 4–5.6)
HCT VFR BLD AUTO: 39.1 % (ref 37–48.5)
HDLC SERPL-MCNC: 50 MG/DL (ref 40–75)
HDLC SERPL: 36.8 % (ref 20–50)
HGB BLD-MCNC: 12.6 GM/DL (ref 12–16)
IMM GRANULOCYTES # BLD AUTO: 0.04 K/UL (ref 0–0.04)
IMM GRANULOCYTES NFR BLD AUTO: 0.4 % (ref 0–0.5)
LDLC SERPL CALC-MCNC: 60.8 MG/DL (ref 63–159)
LYMPHOCYTES # BLD AUTO: 4.1 K/UL (ref 1–4.8)
MCH RBC QN AUTO: 27.7 PG (ref 27–31)
MCHC RBC AUTO-ENTMCNC: 32.2 G/DL (ref 32–36)
MCV RBC AUTO: 86 FL (ref 82–98)
NONHDLC SERPL-MCNC: 86 MG/DL
NUCLEATED RBC (/100WBC) (OHS): 0 /100 WBC
PLATELET # BLD AUTO: 337 K/UL (ref 150–450)
PMV BLD AUTO: 10.2 FL (ref 9.2–12.9)
POTASSIUM SERPL-SCNC: 4.4 MMOL/L (ref 3.5–5.1)
PROT SERPL-MCNC: 6.8 GM/DL (ref 6–8.4)
RBC # BLD AUTO: 4.55 M/UL (ref 4–5.4)
RELATIVE EOSINOPHIL (OHS): 4.9 %
RELATIVE LYMPHOCYTE (OHS): 42.6 % (ref 18–48)
RELATIVE MONOCYTE (OHS): 7.5 % (ref 4–15)
RELATIVE NEUTROPHIL (OHS): 43.4 % (ref 38–73)
SODIUM SERPL-SCNC: 137 MMOL/L (ref 136–145)
TRIGL SERPL-MCNC: 126 MG/DL (ref 30–150)
TSH SERPL-ACNC: 1.55 UIU/ML (ref 0.4–4)
WBC # BLD AUTO: 9.62 K/UL (ref 3.9–12.7)

## 2025-08-21 PROCEDURE — 80053 COMPREHEN METABOLIC PANEL: CPT

## 2025-08-21 PROCEDURE — 83036 HEMOGLOBIN GLYCOSYLATED A1C: CPT

## 2025-08-21 PROCEDURE — 36415 COLL VENOUS BLD VENIPUNCTURE: CPT | Mod: PO

## 2025-08-21 PROCEDURE — 84443 ASSAY THYROID STIM HORMONE: CPT

## 2025-08-21 PROCEDURE — 85025 COMPLETE CBC W/AUTO DIFF WBC: CPT

## 2025-08-21 PROCEDURE — 82465 ASSAY BLD/SERUM CHOLESTEROL: CPT

## 2025-08-23 ENCOUNTER — PATIENT MESSAGE (OUTPATIENT)
Dept: FAMILY MEDICINE | Facility: CLINIC | Age: 75
End: 2025-08-23
Payer: MEDICARE

## 2025-08-24 DIAGNOSIS — E11.8 DIABETES MELLITUS TYPE 2 WITH COMPLICATIONS: ICD-10-CM

## 2025-08-25 LAB
LEFT EYE DM RETINOPATHY: NEGATIVE
RIGHT EYE DM RETINOPATHY: NEGATIVE

## 2025-08-25 RX ORDER — SEMAGLUTIDE 0.68 MG/ML
0.5 INJECTION, SOLUTION SUBCUTANEOUS
Qty: 9 ML | Refills: 1 | Status: SHIPPED | OUTPATIENT
Start: 2025-08-25 | End: 2025-08-28

## 2025-08-28 ENCOUNTER — OFFICE VISIT (OUTPATIENT)
Dept: FAMILY MEDICINE | Facility: CLINIC | Age: 75
End: 2025-08-28
Payer: MEDICARE

## 2025-08-28 VITALS
RESPIRATION RATE: 18 BRPM | BODY MASS INDEX: 20.62 KG/M2 | SYSTOLIC BLOOD PRESSURE: 130 MMHG | HEART RATE: 94 BPM | WEIGHT: 128.31 LBS | OXYGEN SATURATION: 98 % | TEMPERATURE: 98 F | DIASTOLIC BLOOD PRESSURE: 84 MMHG | HEIGHT: 66 IN

## 2025-08-28 DIAGNOSIS — E11.9 TYPE 2 DIABETES MELLITUS WITHOUT COMPLICATION, WITHOUT LONG-TERM CURRENT USE OF INSULIN: ICD-10-CM

## 2025-08-28 DIAGNOSIS — E11.59 HYPERTENSION ASSOCIATED WITH DIABETES: ICD-10-CM

## 2025-08-28 DIAGNOSIS — E78.5 HYPERLIPIDEMIA ASSOCIATED WITH TYPE 2 DIABETES MELLITUS: Primary | ICD-10-CM

## 2025-08-28 DIAGNOSIS — I15.2 HYPERTENSION ASSOCIATED WITH DIABETES: ICD-10-CM

## 2025-08-28 DIAGNOSIS — E11.69 HYPERLIPIDEMIA ASSOCIATED WITH TYPE 2 DIABETES MELLITUS: Primary | ICD-10-CM

## 2025-08-28 LAB
ALBUMIN/CREAT UR: NORMAL
CREAT UR-MCNC: 32 MG/DL (ref 15–325)
MICROALBUMIN UR-MCNC: <5 UG/ML

## 2025-08-28 PROCEDURE — 3008F BODY MASS INDEX DOCD: CPT | Mod: CPTII,S$GLB,, | Performed by: FAMILY MEDICINE

## 2025-08-28 PROCEDURE — 3079F DIAST BP 80-89 MM HG: CPT | Mod: CPTII,S$GLB,, | Performed by: FAMILY MEDICINE

## 2025-08-28 PROCEDURE — 1157F ADVNC CARE PLAN IN RCRD: CPT | Mod: CPTII,S$GLB,, | Performed by: FAMILY MEDICINE

## 2025-08-28 PROCEDURE — 1160F RVW MEDS BY RX/DR IN RCRD: CPT | Mod: CPTII,S$GLB,, | Performed by: FAMILY MEDICINE

## 2025-08-28 PROCEDURE — 3075F SYST BP GE 130 - 139MM HG: CPT | Mod: CPTII,S$GLB,, | Performed by: FAMILY MEDICINE

## 2025-08-28 PROCEDURE — 4010F ACE/ARB THERAPY RXD/TAKEN: CPT | Mod: CPTII,S$GLB,, | Performed by: FAMILY MEDICINE

## 2025-08-28 PROCEDURE — 99214 OFFICE O/P EST MOD 30 MIN: CPT | Mod: S$GLB,,, | Performed by: FAMILY MEDICINE

## 2025-08-28 PROCEDURE — 3288F FALL RISK ASSESSMENT DOCD: CPT | Mod: CPTII,S$GLB,, | Performed by: FAMILY MEDICINE

## 2025-08-28 PROCEDURE — 3044F HG A1C LEVEL LT 7.0%: CPT | Mod: CPTII,S$GLB,, | Performed by: FAMILY MEDICINE

## 2025-08-28 PROCEDURE — 1101F PT FALLS ASSESS-DOCD LE1/YR: CPT | Mod: CPTII,S$GLB,, | Performed by: FAMILY MEDICINE

## 2025-08-28 PROCEDURE — G2211 COMPLEX E/M VISIT ADD ON: HCPCS | Mod: S$GLB,,, | Performed by: FAMILY MEDICINE

## 2025-08-28 PROCEDURE — 82570 ASSAY OF URINE CREATININE: CPT | Performed by: FAMILY MEDICINE

## 2025-08-28 PROCEDURE — 1126F AMNT PAIN NOTED NONE PRSNT: CPT | Mod: CPTII,S$GLB,, | Performed by: FAMILY MEDICINE

## 2025-08-28 PROCEDURE — 1159F MED LIST DOCD IN RCRD: CPT | Mod: CPTII,S$GLB,, | Performed by: FAMILY MEDICINE

## 2025-08-28 RX ORDER — SEMAGLUTIDE 1.34 MG/ML
1 INJECTION, SOLUTION SUBCUTANEOUS
Qty: 3 ML | Refills: 2 | Status: SHIPPED | OUTPATIENT
Start: 2025-08-28 | End: 2026-08-28

## 2025-08-28 RX ORDER — ATORVASTATIN CALCIUM 20 MG/1
20 TABLET, FILM COATED ORAL NIGHTLY
Qty: 90 TABLET | Refills: 3 | Status: SHIPPED | OUTPATIENT
Start: 2025-08-28

## 2025-08-28 RX ORDER — METHENAMINE HIPPURATE 1000 MG/1
1 TABLET ORAL 2 TIMES DAILY
COMMUNITY
Start: 2025-08-07

## 2025-08-29 RX ORDER — ESTRADIOL 0.1 MG/G
1 CREAM VAGINAL DAILY
Qty: 42.5 G | Refills: 3 | Status: SHIPPED | OUTPATIENT
Start: 2025-08-29

## 2025-08-31 ENCOUNTER — PATIENT MESSAGE (OUTPATIENT)
Dept: FAMILY MEDICINE | Facility: CLINIC | Age: 75
End: 2025-08-31
Payer: MEDICARE

## 2025-09-06 ENCOUNTER — PATIENT OUTREACH (OUTPATIENT)
Dept: ADMINISTRATIVE | Facility: HOSPITAL | Age: 75
End: 2025-09-06
Payer: MEDICARE